# Patient Record
Sex: MALE | Race: WHITE | NOT HISPANIC OR LATINO
[De-identification: names, ages, dates, MRNs, and addresses within clinical notes are randomized per-mention and may not be internally consistent; named-entity substitution may affect disease eponyms.]

---

## 2017-04-21 ENCOUNTER — RX RENEWAL (OUTPATIENT)
Age: 52
End: 2017-04-21

## 2017-04-23 ENCOUNTER — FORM ENCOUNTER (OUTPATIENT)
Age: 52
End: 2017-04-23

## 2017-04-24 ENCOUNTER — APPOINTMENT (OUTPATIENT)
Dept: INTERNAL MEDICINE | Facility: CLINIC | Age: 52
End: 2017-04-24

## 2017-04-24 ENCOUNTER — APPOINTMENT (OUTPATIENT)
Dept: ULTRASOUND IMAGING | Facility: IMAGING CENTER | Age: 52
End: 2017-04-24

## 2017-04-24 ENCOUNTER — OUTPATIENT (OUTPATIENT)
Dept: OUTPATIENT SERVICES | Facility: HOSPITAL | Age: 52
LOS: 1 days | End: 2017-04-24
Payer: COMMERCIAL

## 2017-04-24 VITALS
WEIGHT: 200 LBS | DIASTOLIC BLOOD PRESSURE: 80 MMHG | HEART RATE: 76 BPM | BODY MASS INDEX: 30.31 KG/M2 | SYSTOLIC BLOOD PRESSURE: 126 MMHG | HEIGHT: 68 IN | OXYGEN SATURATION: 97 % | TEMPERATURE: 98.2 F

## 2017-04-24 DIAGNOSIS — R10.9 UNSPECIFIED ABDOMINAL PAIN: ICD-10-CM

## 2017-04-24 DIAGNOSIS — Z98.89 OTHER SPECIFIED POSTPROCEDURAL STATES: Chronic | ICD-10-CM

## 2017-04-24 DIAGNOSIS — R19.7 DIARRHEA, UNSPECIFIED: ICD-10-CM

## 2017-04-24 PROCEDURE — 76700 US EXAM ABDOM COMPLETE: CPT

## 2017-04-25 LAB
ALBUMIN SERPL ELPH-MCNC: 4.7 G/DL
ALP BLD-CCNC: 97 U/L
ALT SERPL-CCNC: 60 U/L
ANION GAP SERPL CALC-SCNC: 19 MMOL/L
APPEARANCE: CLEAR
AST SERPL-CCNC: 40 U/L
BASOPHILS # BLD AUTO: 0.02 K/UL
BASOPHILS NFR BLD AUTO: 0.2 %
BILIRUB SERPL-MCNC: 0.6 MG/DL
BILIRUBIN URINE: NEGATIVE
BLOOD URINE: NEGATIVE
BUN SERPL-MCNC: 19 MG/DL
CALCIUM SERPL-MCNC: 9.7 MG/DL
CHLORIDE SERPL-SCNC: 103 MMOL/L
CO2 SERPL-SCNC: 22 MMOL/L
COLOR: YELLOW
CREAT SERPL-MCNC: 1.16 MG/DL
EOSINOPHIL # BLD AUTO: 0.15 K/UL
EOSINOPHIL NFR BLD AUTO: 1.6 %
GLUCOSE QUALITATIVE U: NORMAL MG/DL
GLUCOSE SERPL-MCNC: 93 MG/DL
HCT VFR BLD CALC: 49.4 %
HGB BLD-MCNC: 16.9 G/DL
IMM GRANULOCYTES NFR BLD AUTO: 0.2 %
KETONES URINE: NEGATIVE
LEUKOCYTE ESTERASE URINE: NEGATIVE
LYMPHOCYTES # BLD AUTO: 2.25 K/UL
LYMPHOCYTES NFR BLD AUTO: 24.5 %
MAN DIFF?: NORMAL
MCHC RBC-ENTMCNC: 31.2 PG
MCHC RBC-ENTMCNC: 34.2 GM/DL
MCV RBC AUTO: 91.3 FL
MONOCYTES # BLD AUTO: 0.72 K/UL
MONOCYTES NFR BLD AUTO: 7.8 %
NEUTROPHILS # BLD AUTO: 6.02 K/UL
NEUTROPHILS NFR BLD AUTO: 65.7 %
NITRITE URINE: NEGATIVE
PH URINE: 5.5
PLATELET # BLD AUTO: 239 K/UL
POTASSIUM SERPL-SCNC: 4.9 MMOL/L
PROT SERPL-MCNC: 7.9 G/DL
PROTEIN URINE: NEGATIVE MG/DL
RBC # BLD: 5.41 M/UL
RBC # FLD: 14 %
SODIUM SERPL-SCNC: 144 MMOL/L
SPECIFIC GRAVITY URINE: 1.02
UROBILINOGEN URINE: NORMAL MG/DL
WBC # FLD AUTO: 9.18 K/UL

## 2017-07-13 ENCOUNTER — RX RENEWAL (OUTPATIENT)
Age: 52
End: 2017-07-13

## 2017-07-24 ENCOUNTER — RX RENEWAL (OUTPATIENT)
Age: 52
End: 2017-07-24

## 2017-09-18 ENCOUNTER — RX RENEWAL (OUTPATIENT)
Age: 52
End: 2017-09-18

## 2017-09-25 ENCOUNTER — TRANSCRIPTION ENCOUNTER (OUTPATIENT)
Age: 52
End: 2017-09-25

## 2017-12-13 ENCOUNTER — RX RENEWAL (OUTPATIENT)
Age: 52
End: 2017-12-13

## 2017-12-18 ENCOUNTER — RX RENEWAL (OUTPATIENT)
Age: 52
End: 2017-12-18

## 2018-02-08 ENCOUNTER — APPOINTMENT (OUTPATIENT)
Dept: INTERNAL MEDICINE | Facility: CLINIC | Age: 53
End: 2018-02-08
Payer: COMMERCIAL

## 2018-02-08 VITALS
OXYGEN SATURATION: 96 % | BODY MASS INDEX: 31.98 KG/M2 | HEART RATE: 90 BPM | HEIGHT: 68 IN | SYSTOLIC BLOOD PRESSURE: 140 MMHG | WEIGHT: 211 LBS | DIASTOLIC BLOOD PRESSURE: 110 MMHG | TEMPERATURE: 98.2 F

## 2018-02-08 PROCEDURE — 99213 OFFICE O/P EST LOW 20 MIN: CPT | Mod: 25

## 2018-02-08 PROCEDURE — 87804 INFLUENZA ASSAY W/OPTIC: CPT | Mod: QW

## 2018-02-08 PROCEDURE — 87880 STREP A ASSAY W/OPTIC: CPT | Mod: QW

## 2018-02-09 LAB
FLUAV SPEC QL CULT: NORMAL
FLUBV AG SPEC QL IA: NORMAL
S PYO AG SPEC QL IA: NORMAL

## 2018-03-19 ENCOUNTER — RX RENEWAL (OUTPATIENT)
Age: 53
End: 2018-03-19

## 2018-04-13 ENCOUNTER — RX RENEWAL (OUTPATIENT)
Age: 53
End: 2018-04-13

## 2018-05-29 ENCOUNTER — RX RENEWAL (OUTPATIENT)
Age: 53
End: 2018-05-29

## 2018-06-04 ENCOUNTER — TRANSCRIPTION ENCOUNTER (OUTPATIENT)
Age: 53
End: 2018-06-04

## 2018-06-05 ENCOUNTER — RESULT REVIEW (OUTPATIENT)
Age: 53
End: 2018-06-05

## 2018-07-17 ENCOUNTER — RESULT REVIEW (OUTPATIENT)
Age: 53
End: 2018-07-17

## 2018-08-03 ENCOUNTER — APPOINTMENT (OUTPATIENT)
Dept: INTERNAL MEDICINE | Facility: CLINIC | Age: 53
End: 2018-08-03
Payer: COMMERCIAL

## 2018-08-03 ENCOUNTER — LABORATORY RESULT (OUTPATIENT)
Age: 53
End: 2018-08-03

## 2018-08-03 VITALS
OXYGEN SATURATION: 98 % | SYSTOLIC BLOOD PRESSURE: 154 MMHG | HEART RATE: 93 BPM | TEMPERATURE: 98.5 F | HEIGHT: 68 IN | BODY MASS INDEX: 31.37 KG/M2 | WEIGHT: 207 LBS | DIASTOLIC BLOOD PRESSURE: 104 MMHG

## 2018-08-03 PROCEDURE — 99213 OFFICE O/P EST LOW 20 MIN: CPT

## 2018-08-03 RX ORDER — CIPROFLOXACIN HYDROCHLORIDE 500 MG/1
500 TABLET, FILM COATED ORAL
Qty: 14 | Refills: 0 | Status: DISCONTINUED | COMMUNITY
Start: 2017-04-24 | End: 2018-08-03

## 2018-08-03 RX ORDER — IBUPROFEN 800 MG/1
800 TABLET, FILM COATED ORAL
Qty: 30 | Refills: 0 | Status: DISCONTINUED | COMMUNITY
Start: 2017-03-09 | End: 2018-08-03

## 2018-08-03 RX ORDER — AZITHROMYCIN 250 MG/1
250 TABLET, FILM COATED ORAL
Qty: 1 | Refills: 0 | Status: DISCONTINUED | COMMUNITY
Start: 2018-02-08 | End: 2018-08-03

## 2018-08-03 RX ORDER — METRONIDAZOLE 500 MG/1
500 TABLET ORAL EVERY 8 HOURS
Qty: 21 | Refills: 0 | Status: DISCONTINUED | COMMUNITY
Start: 2017-04-24 | End: 2018-08-03

## 2018-08-03 NOTE — PHYSICAL EXAM
[No Acute Distress] : no acute distress [Well-Appearing] : well-appearing [No CVA Tenderness] : no CVA  tenderness [de-identified] : vesicular lesion on penis testicles, inner thigh and left buttock

## 2018-08-03 NOTE — PLAN
[FreeTextEntry1] : \par #1 Genital lesions\par Likely herpes\par Viral culture sent\par Check IgM and IgG\par Start valacyclovir\par \par #2 Elevated BP\par No longer taking metoprolol, was previously advised he could stop by cardiology\par Will check home BP\par Schedule PCP follow up\par

## 2018-08-03 NOTE — HISTORY OF PRESENT ILLNESS
[FreeTextEntry8] : \par Rash on genital area and left inner thigh and extends to left buttock.\par Looked like blisters initially 2 days ago.  Now very painful.  No oozing.  Feeling otherwise well, no fever/chills, no fatigue, no body aches, no dysuria, no GI symptoms.  \par No recent ravel, no swimming.  \par Sexually active with wife only.  \par H/o herpes lesions around mouth since childhood.  \par

## 2018-08-07 RX ORDER — VALACYCLOVIR 1 G/1
1 TABLET, FILM COATED ORAL
Qty: 14 | Refills: 0 | Status: COMPLETED | COMMUNITY
Start: 2018-08-03 | End: 2018-08-07

## 2018-09-20 ENCOUNTER — MOBILE ON CALL (OUTPATIENT)
Age: 53
End: 2018-09-20

## 2018-10-04 ENCOUNTER — RX RENEWAL (OUTPATIENT)
Age: 53
End: 2018-10-04

## 2018-10-05 ENCOUNTER — RX RENEWAL (OUTPATIENT)
Age: 53
End: 2018-10-05

## 2018-10-29 ENCOUNTER — MEDICATION RENEWAL (OUTPATIENT)
Age: 53
End: 2018-10-29

## 2019-01-18 ENCOUNTER — NON-APPOINTMENT (OUTPATIENT)
Age: 54
End: 2019-01-18

## 2019-01-18 ENCOUNTER — APPOINTMENT (OUTPATIENT)
Dept: INTERNAL MEDICINE | Facility: CLINIC | Age: 54
End: 2019-01-18
Payer: COMMERCIAL

## 2019-01-18 VITALS
OXYGEN SATURATION: 96 % | HEART RATE: 85 BPM | WEIGHT: 214 LBS | TEMPERATURE: 98 F | DIASTOLIC BLOOD PRESSURE: 102 MMHG | BODY MASS INDEX: 32.43 KG/M2 | HEIGHT: 68 IN | SYSTOLIC BLOOD PRESSURE: 160 MMHG

## 2019-01-18 VITALS — DIASTOLIC BLOOD PRESSURE: 110 MMHG | SYSTOLIC BLOOD PRESSURE: 170 MMHG

## 2019-01-18 PROCEDURE — 93000 ELECTROCARDIOGRAM COMPLETE: CPT

## 2019-01-18 PROCEDURE — 99215 OFFICE O/P EST HI 40 MIN: CPT | Mod: 25

## 2019-01-18 PROCEDURE — 36415 COLL VENOUS BLD VENIPUNCTURE: CPT

## 2019-01-18 RX ORDER — BLOOD PRESSURE TEST KIT-LARGE
KIT MISCELLANEOUS
Qty: 1 | Refills: 0 | Status: DISCONTINUED | COMMUNITY
Start: 2018-08-03 | End: 2019-01-18

## 2019-01-18 NOTE — ASSESSMENT
[FreeTextEntry1] : HTN:  given extreme elevation, unlikley to be simple white coat HTN\par -needs home monitoring \par -start meds today: amlodipine 10mg daily\par -HTN may be the cause of new HA for the past year\par -labs to assess renal fucntion\par \par Metabolic screening: on statin, needs a1c\par \par Hx of Afib: now seeimingly resolved, SR today.  \par \par Requests sildenafil refill.  \par \par HCM:  flu shot today\par will be seeing GI (Sideridis) next week, with plan to do CLS and EGD \par \par F/u in 4-6 weeks

## 2019-01-18 NOTE — HISTORY OF PRESENT ILLNESS
[FreeTextEntry1] : f/u multiple issues [de-identified] : 54 y/o male with GERD, transaminitis, hx of AFIB, elevated BP, last routine f/u here in 2016.  \par \par HCM: no CRC screening on file\par -needs metabolic screening.  \par \par C/o HA x 1 year, 1 x a week, better with excedrin migraine. no nausea, no fatigue, no photo/phonophobia. never had HA before.  Last eye exam last week, fine.  \par \par Will be seeing Fidelia from GI next week.  No GERD symptoms with pantoprazole.  will be needing CLS and EGD. \par \par Had afib preop, states it was stress related.  Last EKG was SR.  Took metoprolol for a short time but did not tolerate side effects (significant fatigue).

## 2019-01-18 NOTE — PHYSICAL EXAM
[No Acute Distress] : no acute distress [Normal Sclera/Conjunctiva] : normal sclera/conjunctiva [Normal Oropharynx] : the oropharynx was normal [Supple] : supple [No Respiratory Distress] : no respiratory distress  [Clear to Auscultation] : lungs were clear to auscultation bilaterally [Normal Rate] : normal rate  [Normal S1, S2] : normal S1 and S2 [No Murmur] : no murmur heard [No Edema] : there was no peripheral edema [Normal Supraclavicular Nodes] : no supraclavicular lymphadenopathy [Normal Anterior Cervical Nodes] : no anterior cervical lymphadenopathy

## 2019-01-22 ENCOUNTER — RX RENEWAL (OUTPATIENT)
Age: 54
End: 2019-01-22

## 2019-01-29 LAB
ALBUMIN SERPL ELPH-MCNC: 5 G/DL
ALP BLD-CCNC: 102 U/L
ALT SERPL-CCNC: 109 U/L
ANION GAP SERPL CALC-SCNC: 12 MMOL/L
AST SERPL-CCNC: 66 U/L
BASOPHILS # BLD AUTO: 0.02 K/UL
BASOPHILS NFR BLD AUTO: 0.3 %
BILIRUB SERPL-MCNC: 0.5 MG/DL
BUN SERPL-MCNC: 17 MG/DL
CALCIUM SERPL-MCNC: 10.1 MG/DL
CHLORIDE SERPL-SCNC: 106 MMOL/L
CHOLEST SERPL-MCNC: 144 MG/DL
CHOLEST/HDLC SERPL: 3.1 RATIO
CO2 SERPL-SCNC: 24 MMOL/L
CREAT SERPL-MCNC: 0.96 MG/DL
EOSINOPHIL # BLD AUTO: 0.19 K/UL
EOSINOPHIL NFR BLD AUTO: 2.5 %
GLUCOSE SERPL-MCNC: 81 MG/DL
HBA1C MFR BLD HPLC: 5.8 %
HCT VFR BLD CALC: 48.1 %
HDLC SERPL-MCNC: 47 MG/DL
HGB BLD-MCNC: 16.2 G/DL
IMM GRANULOCYTES NFR BLD AUTO: 0.4 %
LDLC SERPL CALC-MCNC: 72 MG/DL
LYMPHOCYTES # BLD AUTO: 2.54 K/UL
LYMPHOCYTES NFR BLD AUTO: 33 %
MAN DIFF?: NORMAL
MCHC RBC-ENTMCNC: 30.2 PG
MCHC RBC-ENTMCNC: 33.7 GM/DL
MCV RBC AUTO: 89.6 FL
MONOCYTES # BLD AUTO: 0.63 K/UL
MONOCYTES NFR BLD AUTO: 8.2 %
NEUTROPHILS # BLD AUTO: 4.28 K/UL
NEUTROPHILS NFR BLD AUTO: 55.6 %
PLATELET # BLD AUTO: 252 K/UL
POTASSIUM SERPL-SCNC: 4.2 MMOL/L
PROT SERPL-MCNC: 7.7 G/DL
RBC # BLD: 5.37 M/UL
RBC # FLD: 13.4 %
SODIUM SERPL-SCNC: 142 MMOL/L
TRIGL SERPL-MCNC: 126 MG/DL
TSH SERPL-ACNC: 2.03 UIU/ML
WBC # FLD AUTO: 7.69 K/UL

## 2019-02-11 ENCOUNTER — MEDICATION RENEWAL (OUTPATIENT)
Age: 54
End: 2019-02-11

## 2019-03-25 ENCOUNTER — APPOINTMENT (OUTPATIENT)
Dept: INTERNAL MEDICINE | Facility: CLINIC | Age: 54
End: 2019-03-25

## 2019-05-09 ENCOUNTER — RX RENEWAL (OUTPATIENT)
Age: 54
End: 2019-05-09

## 2019-06-28 ENCOUNTER — RX RENEWAL (OUTPATIENT)
Age: 54
End: 2019-06-28

## 2019-10-22 ENCOUNTER — APPOINTMENT (OUTPATIENT)
Dept: INTERNAL MEDICINE | Facility: CLINIC | Age: 54
End: 2019-10-22

## 2019-10-22 ENCOUNTER — APPOINTMENT (OUTPATIENT)
Dept: INTERNAL MEDICINE | Facility: CLINIC | Age: 54
End: 2019-10-22
Payer: COMMERCIAL

## 2019-10-22 VITALS
TEMPERATURE: 98.8 F | DIASTOLIC BLOOD PRESSURE: 80 MMHG | BODY MASS INDEX: 32.28 KG/M2 | HEART RATE: 79 BPM | OXYGEN SATURATION: 98 % | HEIGHT: 68 IN | SYSTOLIC BLOOD PRESSURE: 126 MMHG | WEIGHT: 213 LBS

## 2019-10-22 DIAGNOSIS — K21.9 GASTRO-ESOPHAGEAL REFLUX DISEASE W/OUT ESOPHAGITIS: ICD-10-CM

## 2019-10-22 DIAGNOSIS — S16.1XXA STRAIN OF MUSCLE, FASCIA AND TENDON AT NECK LEVEL, INITIAL ENCOUNTER: ICD-10-CM

## 2019-10-22 PROCEDURE — 99214 OFFICE O/P EST MOD 30 MIN: CPT

## 2019-10-22 NOTE — REVIEW OF SYSTEMS
[Fever] : no fever [Chills] : no chills [Night Sweats] : no night sweats [Hearing Loss] : no hearing loss [Earache] : no earache [Sore Throat] : no sore throat [Hoarseness] : no hoarseness [Chest Pain] : no chest pain [Palpitations] : no palpitations [Lower Ext Edema] : no lower extremity edema [Orthopena] : no orthopnea [Paroxysmal Nocturnal Dyspnea] : no paroxysmal nocturnal dyspnea [Shortness Of Breath] : no shortness of breath [Cough] : no cough [Abdominal Pain] : no abdominal pain [Nausea] : no nausea [Heartburn] : no heartburn [Back Pain] : no back pain [Negative] : Heme/Lymph [FreeTextEntry9] : see HPI - neck pain and left shoulder pain

## 2019-10-22 NOTE — ASSESSMENT
[FreeTextEntry1] : \par Left neck strain:\par NSAIDs with no improvement\par Will try a course of prednisone + muscle relaxant, warm compresses, light massage, increased ROM exercises as tolerated\par Follow up for PT once improved (will go through work[employer])\par \par Hypertension:\par Well controlled on amlodipine, continue\par \par GERD:\par Will try to wean PPI once prednisone stopped, 20mg pantoprazole sent into mail order pharmacy (currently taking 40mg)\par \par Schedule PCP follow up\par

## 2019-10-22 NOTE — PHYSICAL EXAM
[Normal] : normal rate, regular rhythm, normal S1 and S2 and no murmur heard [No Edema] : there was no peripheral edema [No Extremity Clubbing/Cyanosis] : no extremity clubbing/cyanosis [No Rash] : no rash [No Joint Swelling] : no joint swelling [de-identified] : considerably impaired ROM with left arm - unable to raise left arm past 90 degrees; tender over left trap

## 2019-10-22 NOTE — HISTORY OF PRESENT ILLNESS
[FreeTextEntry8] : \coretta Presents today for flu shot and c/o left posterior neck pain x 2-3 weeks.  Hurts to turn the neck.  Thought had just slept wrong.  Left shoulder surgery in 2014 and 2016 (work injury - labrum and rotator cuff repair, then developed bone spur). \coretta Has had some left arm numbness/tingling.  \coretta Has been taking Advil and Aleve (once/day) as well as Voltaren get.  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  \par \coretta Has been taking amlodipine since last visit.  Headaches are gone.  Has been checking BP at home, reports normal readings Does not have log but think 100s-120s/60s-80s).  Tolerating amlodipine.  \par \coretta Has been on pantoprazole 40mg for many years,  denies any current heartburn symptoms.  Recent GI follow up, needs EGD and CRS.  \par

## 2019-11-05 ENCOUNTER — MEDICATION RENEWAL (OUTPATIENT)
Age: 54
End: 2019-11-05

## 2019-11-06 ENCOUNTER — MEDICATION RENEWAL (OUTPATIENT)
Age: 54
End: 2019-11-06

## 2019-11-21 ENCOUNTER — RX RENEWAL (OUTPATIENT)
Age: 54
End: 2019-11-21

## 2019-12-13 ENCOUNTER — APPOINTMENT (OUTPATIENT)
Dept: INTERNAL MEDICINE | Facility: CLINIC | Age: 54
End: 2019-12-13
Payer: COMMERCIAL

## 2019-12-13 ENCOUNTER — TRANSCRIPTION ENCOUNTER (OUTPATIENT)
Age: 54
End: 2019-12-13

## 2019-12-13 VITALS
HEIGHT: 68 IN | BODY MASS INDEX: 32.74 KG/M2 | DIASTOLIC BLOOD PRESSURE: 80 MMHG | OXYGEN SATURATION: 98 % | HEART RATE: 83 BPM | TEMPERATURE: 97.9 F | SYSTOLIC BLOOD PRESSURE: 152 MMHG | WEIGHT: 216 LBS

## 2019-12-13 VITALS — DIASTOLIC BLOOD PRESSURE: 85 MMHG | SYSTOLIC BLOOD PRESSURE: 135 MMHG

## 2019-12-13 DIAGNOSIS — B02.39 OTHER HERPES ZOSTER EYE DISEASE: ICD-10-CM

## 2019-12-13 DIAGNOSIS — Z87.09 PERSONAL HISTORY OF OTHER DISEASES OF THE RESPIRATORY SYSTEM: ICD-10-CM

## 2019-12-13 DIAGNOSIS — Z87.898 PERSONAL HISTORY OF OTHER SPECIFIED CONDITIONS: ICD-10-CM

## 2019-12-13 DIAGNOSIS — N50.89 OTHER SPECIFIED DISORDERS OF THE MALE GENITAL ORGANS: ICD-10-CM

## 2019-12-13 DIAGNOSIS — R10.9 UNSPECIFIED ABDOMINAL PAIN: ICD-10-CM

## 2019-12-13 DIAGNOSIS — F43.22 ADJUSTMENT DISORDER WITH ANXIETY: ICD-10-CM

## 2019-12-13 DIAGNOSIS — Z87.19 PERSONAL HISTORY OF OTHER DISEASES OF THE DIGESTIVE SYSTEM: ICD-10-CM

## 2019-12-13 DIAGNOSIS — R07.1 CHEST PAIN ON BREATHING: ICD-10-CM

## 2019-12-13 PROCEDURE — 36415 COLL VENOUS BLD VENIPUNCTURE: CPT

## 2019-12-13 PROCEDURE — 99214 OFFICE O/P EST MOD 30 MIN: CPT | Mod: 25

## 2019-12-13 RX ORDER — PREDNISONE 50 MG/1
50 TABLET ORAL DAILY
Qty: 5 | Refills: 0 | Status: DISCONTINUED | COMMUNITY
Start: 2019-10-22 | End: 2019-12-13

## 2019-12-13 RX ORDER — CYCLOBENZAPRINE HYDROCHLORIDE 10 MG/1
10 TABLET, FILM COATED ORAL
Qty: 10 | Refills: 0 | Status: DISCONTINUED | COMMUNITY
Start: 2019-10-22 | End: 2019-12-13

## 2019-12-13 NOTE — PHYSICAL EXAM
[Normal Oropharynx] : the oropharynx was normal [No Acute Distress] : no acute distress [No Lymphadenopathy] : no lymphadenopathy [No Respiratory Distress] : no respiratory distress  [Clear to Auscultation] : lungs were clear to auscultation bilaterally [Normal Rate] : normal rate  [Regular Rhythm] : with a regular rhythm [Normal S1, S2] : normal S1 and S2

## 2019-12-13 NOTE — ASSESSMENT
[FreeTextEntry1] : HTN:  adequate control on repeat. continut amlodipine 10\par \par HLD: labs today\par \par Transaminitis:  check labs today, may need new imaging to eval for fatty liver\par \par URI symptoms: likely viral, advised nsaids if needed for sore throat, fluids, rest, avoid decongestants, call or return if fevrs, CP< SOB or other new or worsening symptoms\par \par

## 2019-12-13 NOTE — REVIEW OF SYSTEMS
[Nasal Discharge] : nasal discharge [Earache] : no earache [Shortness Of Breath] : no shortness of breath [Wheezing] : no wheezing [Sore Throat] : sore throat [Dyspnea on Exertion] : not dyspnea on exertion [Cough] : cough [Negative] : Gastrointestinal

## 2019-12-13 NOTE — HISTORY OF PRESENT ILLNESS
[FreeTextEntry1] : F/u HTN. HLD, transaminitis [de-identified] : Reports he had the flu shot already?\par \par 5 days of stuffy nose sore throat, mild cough, no SOB, no fevers, no body aches.\par \par Did not see GI , but does need to do this (due for EGD CLS)\par \par Also planning for vasectomy.\par \par reports adherence to 3 pills, doing fine with 20mg of PPI.  \par \par

## 2019-12-17 LAB
ALBUMIN SERPL ELPH-MCNC: 4.8 G/DL
ALP BLD-CCNC: 117 U/L
ALT SERPL-CCNC: 109 U/L
ANION GAP SERPL CALC-SCNC: 12 MMOL/L
AST SERPL-CCNC: 57 U/L
BASOPHILS # BLD AUTO: 0.03 K/UL
BASOPHILS NFR BLD AUTO: 0.5 %
BILIRUB SERPL-MCNC: 0.4 MG/DL
BUN SERPL-MCNC: 14 MG/DL
CALCIUM SERPL-MCNC: 9.7 MG/DL
CHLORIDE SERPL-SCNC: 104 MMOL/L
CHOLEST SERPL-MCNC: 139 MG/DL
CHOLEST/HDLC SERPL: 4 RATIO
CO2 SERPL-SCNC: 25 MMOL/L
CREAT SERPL-MCNC: 0.99 MG/DL
EOSINOPHIL # BLD AUTO: 0.18 K/UL
EOSINOPHIL NFR BLD AUTO: 2.7 %
ESTIMATED AVERAGE GLUCOSE: 117 MG/DL
GLUCOSE SERPL-MCNC: 123 MG/DL
HBA1C MFR BLD HPLC: 5.7 %
HCT VFR BLD CALC: 49.1 %
HDLC SERPL-MCNC: 35 MG/DL
HGB BLD-MCNC: 16.5 G/DL
IMM GRANULOCYTES NFR BLD AUTO: 0.3 %
LDLC SERPL CALC-MCNC: 44 MG/DL
LYMPHOCYTES # BLD AUTO: 1.63 K/UL
LYMPHOCYTES NFR BLD AUTO: 24.6 %
MAN DIFF?: NORMAL
MCHC RBC-ENTMCNC: 30.7 PG
MCHC RBC-ENTMCNC: 33.6 GM/DL
MCV RBC AUTO: 91.4 FL
MONOCYTES # BLD AUTO: 0.74 K/UL
MONOCYTES NFR BLD AUTO: 11.2 %
NEUTROPHILS # BLD AUTO: 4.02 K/UL
NEUTROPHILS NFR BLD AUTO: 60.7 %
PLATELET # BLD AUTO: 268 K/UL
POTASSIUM SERPL-SCNC: 4.4 MMOL/L
PROT SERPL-MCNC: 7.3 G/DL
RBC # BLD: 5.37 M/UL
RBC # FLD: 13 %
SODIUM SERPL-SCNC: 141 MMOL/L
TRIGL SERPL-MCNC: 300 MG/DL
WBC # FLD AUTO: 6.62 K/UL

## 2019-12-20 ENCOUNTER — APPOINTMENT (OUTPATIENT)
Dept: UROLOGY | Facility: CLINIC | Age: 54
End: 2019-12-20
Payer: COMMERCIAL

## 2019-12-20 DIAGNOSIS — Z30.09 ENCOUNTER FOR OTHER GENERAL COUNSELING AND ADVICE ON CONTRACEPTION: ICD-10-CM

## 2019-12-20 DIAGNOSIS — Z78.9 OTHER SPECIFIED HEALTH STATUS: ICD-10-CM

## 2019-12-20 PROCEDURE — 99203 OFFICE O/P NEW LOW 30 MIN: CPT

## 2019-12-20 NOTE — PHYSICAL EXAM
[General Appearance - Well Developed] : well developed [General Appearance - Well Nourished] : well nourished [Normal Appearance] : normal appearance [Well Groomed] : well groomed [General Appearance - In No Acute Distress] : no acute distress [Edema] : no peripheral edema [Exaggerated Use Of Accessory Muscles For Inspiration] : no accessory muscle use [Respiration, Rhythm And Depth] : normal respiratory rhythm and effort [Abdomen Soft] : soft [Abdomen Tenderness] : non-tender [Urethral Meatus] : meatus normal [Costovertebral Angle Tenderness] : no ~M costovertebral angle tenderness [Urinary Bladder Findings] : the bladder was normal on palpation [Scrotum] : the scrotum was normal [Testes Tenderness] : no tenderness of the testes [Testes Mass (___cm)] : there were no testicular masses [Normal Station and Gait] : the gait and station were normal for the patient's age [No Focal Deficits] : no focal deficits [] : no rash [Oriented To Time, Place, And Person] : oriented to person, place, and time [Affect] : the affect was normal [Mood] : the mood was normal [Not Anxious] : not anxious [FreeTextEntry1] : b/l palp vas

## 2019-12-20 NOTE — PHYSICAL EXAM
[General Appearance - Well Developed] : well developed [General Appearance - Well Nourished] : well nourished [Well Groomed] : well groomed [Normal Appearance] : normal appearance [General Appearance - In No Acute Distress] : no acute distress [Edema] : no peripheral edema [Respiration, Rhythm And Depth] : normal respiratory rhythm and effort [Exaggerated Use Of Accessory Muscles For Inspiration] : no accessory muscle use [Abdomen Soft] : soft [Abdomen Tenderness] : non-tender [Urethral Meatus] : meatus normal [Costovertebral Angle Tenderness] : no ~M costovertebral angle tenderness [Scrotum] : the scrotum was normal [Urinary Bladder Findings] : the bladder was normal on palpation [Testes Mass (___cm)] : there were no testicular masses [Testes Tenderness] : no tenderness of the testes [Normal Station and Gait] : the gait and station were normal for the patient's age [No Focal Deficits] : no focal deficits [] : no rash [Oriented To Time, Place, And Person] : oriented to person, place, and time [Affect] : the affect was normal [Not Anxious] : not anxious [Mood] : the mood was normal [FreeTextEntry1] : b/l palp vas

## 2019-12-20 NOTE — HISTORY OF PRESENT ILLNESS
[FreeTextEntry1] : Patient is a 55 yo M who presents for vasectomy consultation.  He was one child age 13 (daughter).  He is not interested in anymore children.  He has no urinary or sexual complaints.  His wife is not able to take oral contraceptives.  No scrotal pain.\par

## 2019-12-20 NOTE — ASSESSMENT
[FreeTextEntry1] : Patient is a 53 yo M who presents for vasectomy consultation.\par \par -Discussed with pt that vasectomy is permanent sterilization procedure\par -D/w pt that vasectomy is reversible it depends a number of factors for success and should not be an expectation postoperatively\par -he is aware and both he and wife have discussed vasectomy for past several mos/yrs and are in agreement\par -d/w pt that he cannot engage in unprotected intercourse without risk of pregnancy until confirmation of sterility with semen analysis, likely will require 15-20 ejaculations and/or >3 months\par -d/w he cannot engage in sports/exercise/sexual activity for at least 1 wk after vasectomy\par -d/w pt that I do not perform no scalpel vasectomy, and that I use scalpel\par -d/w pt risks/benefits/alternatives of vasectomy, including female contraception\par -d/w pt risks of chronic orchalgia, infection, bleeding/hematoma\par -[place] sterilization consent signed, he will schedule at his convenience\par -All questions answered, f/u for vasectomy under local

## 2020-01-06 ENCOUNTER — RX RENEWAL (OUTPATIENT)
Age: 55
End: 2020-01-06

## 2020-01-06 ENCOUNTER — TRANSCRIPTION ENCOUNTER (OUTPATIENT)
Age: 55
End: 2020-01-06

## 2020-01-21 ENCOUNTER — CLINICAL ADVICE (OUTPATIENT)
Age: 55
End: 2020-01-21

## 2020-01-23 ENCOUNTER — CLINICAL ADVICE (OUTPATIENT)
Age: 55
End: 2020-01-23

## 2020-01-23 DIAGNOSIS — F41.8 OTHER SPECIFIED ANXIETY DISORDERS: ICD-10-CM

## 2020-01-24 ENCOUNTER — APPOINTMENT (OUTPATIENT)
Dept: UROLOGY | Facility: CLINIC | Age: 55
End: 2020-01-24
Payer: COMMERCIAL

## 2020-01-24 ENCOUNTER — OUTPATIENT (OUTPATIENT)
Dept: OUTPATIENT SERVICES | Facility: HOSPITAL | Age: 55
LOS: 1 days | End: 2020-01-24
Payer: COMMERCIAL

## 2020-01-24 VITALS
DIASTOLIC BLOOD PRESSURE: 87 MMHG | TEMPERATURE: 98.6 F | SYSTOLIC BLOOD PRESSURE: 146 MMHG | HEART RATE: 93 BPM | RESPIRATION RATE: 16 BRPM

## 2020-01-24 VITALS — SYSTOLIC BLOOD PRESSURE: 139 MMHG | DIASTOLIC BLOOD PRESSURE: 87 MMHG

## 2020-01-24 DIAGNOSIS — Z98.89 OTHER SPECIFIED POSTPROCEDURAL STATES: Chronic | ICD-10-CM

## 2020-01-24 DIAGNOSIS — R35.0 FREQUENCY OF MICTURITION: ICD-10-CM

## 2020-01-24 PROCEDURE — 55250 REMOVAL OF SPERM DUCT(S): CPT

## 2020-01-27 ENCOUNTER — CLINICAL ADVICE (OUTPATIENT)
Age: 55
End: 2020-01-27

## 2020-01-28 ENCOUNTER — APPOINTMENT (OUTPATIENT)
Age: 55
End: 2020-01-28

## 2020-01-28 LAB — CORE LAB BIOPSY: NORMAL

## 2020-01-29 ENCOUNTER — CLINICAL ADVICE (OUTPATIENT)
Age: 55
End: 2020-01-29

## 2020-01-29 DIAGNOSIS — Z30.2 ENCOUNTER FOR STERILIZATION: ICD-10-CM

## 2020-02-24 ENCOUNTER — TRANSCRIPTION ENCOUNTER (OUTPATIENT)
Age: 55
End: 2020-02-24

## 2020-03-10 ENCOUNTER — APPOINTMENT (OUTPATIENT)
Dept: UROLOGY | Facility: CLINIC | Age: 55
End: 2020-03-10
Payer: COMMERCIAL

## 2020-03-10 PROCEDURE — 99024 POSTOP FOLLOW-UP VISIT: CPT

## 2020-03-10 NOTE — HISTORY OF PRESENT ILLNESS
[FreeTextEntry1] : Patient is a 53 yo M who presents for orchalgia.  He reports mild constant right sided scrotal pain since vasectomy 1/24/20.  Rates as 2-3/10.  Has not been taking any pain medication.  He has resumed his normal activities/exercise.  He is anxious because he recently felt a lump next to his right testis for past couple of days.  No fever/chills.  No dysuria or change in urination.\par

## 2020-03-10 NOTE — ASSESSMENT
[FreeTextEntry1] : Patient is a 55 yo M who presents for R orchalgia.\par \par On exam appears to be related to vasectomy - ?sperm granuloma or suture.\par Reassured pt\par NSAIDs x2 wks\par F/u for post-vasectomy SA

## 2020-03-10 NOTE — PHYSICAL EXAM
[General Appearance - Well Developed] : well developed [General Appearance - Well Nourished] : well nourished [Normal Appearance] : normal appearance [Well Groomed] : well groomed [General Appearance - In No Acute Distress] : no acute distress [Scrotum] : the scrotum was normal [Testes Tenderness] : no tenderness of the testes [Testes Mass (___cm)] : there were no testicular masses [FreeTextEntry1] : small firm subcentimeter lump around site of vasectomy in R hemiscrotum.  Mildly ttp.  Separate from R testis.

## 2020-03-12 ENCOUNTER — APPOINTMENT (OUTPATIENT)
Age: 55
End: 2020-03-12

## 2020-03-12 LAB — BACTERIA UR CULT: NORMAL

## 2020-03-13 ENCOUNTER — APPOINTMENT (OUTPATIENT)
Dept: UROLOGY | Facility: CLINIC | Age: 55
End: 2020-03-13
Payer: COMMERCIAL

## 2020-03-13 DIAGNOSIS — N50.819 TESTICULAR PAIN, UNSPECIFIED: ICD-10-CM

## 2020-03-13 PROCEDURE — 99024 POSTOP FOLLOW-UP VISIT: CPT

## 2020-03-13 NOTE — PHYSICAL EXAM
[General Appearance - Well Developed] : well developed [General Appearance - Well Nourished] : well nourished [Normal Appearance] : normal appearance [Well Groomed] : well groomed [General Appearance - In No Acute Distress] : no acute distress [FreeTextEntry1] : L testis swollen and ttp c/w orchitis.  No palpable fluid collection or abscess palpable.  No fluctuance or crepitus.  Diffuse erythema of scrotum

## 2020-03-13 NOTE — ASSESSMENT
[FreeTextEntry1] : Patient is a 53 yo M who presents for orchalgia, orchitis.\par \par Repeat urine for cx and GC testing\par Initiate empiric doxy\par Cont motrin\par F/u 1 mo

## 2020-03-13 NOTE — HISTORY OF PRESENT ILLNESS
[FreeTextEntry1] : Patient is a 53 yo M who presents for orchalgia.  He reports mild constant right sided scrotal pain since vasectomy 1/24/20.  Rates as 2-3/10.  Has not been taking any pain medication.  He has resumed his normal activities/exercise.  He is anxious because he recently felt a lump next to his right testis for past couple of days.  No fever/chills.  No dysuria or change in urination.\par He reports now his left testis over past 2 days has become more swollen and painful.  Now a sharp severe constant pain.  Urine cx from 3/9/20 was negative.\par No fever/chills.\par

## 2020-03-16 LAB
BACTERIA UR CULT: NORMAL
C TRACH RRNA SPEC QL NAA+PROBE: NOT DETECTED
N GONORRHOEA RRNA SPEC QL NAA+PROBE: NOT DETECTED
SOURCE AMPLIFICATION: NORMAL

## 2020-03-29 ENCOUNTER — RX RENEWAL (OUTPATIENT)
Age: 55
End: 2020-03-29

## 2020-04-20 ENCOUNTER — OUTPATIENT (OUTPATIENT)
Dept: OUTPATIENT SERVICES | Facility: HOSPITAL | Age: 55
LOS: 1 days | End: 2020-04-20
Payer: COMMERCIAL

## 2020-04-20 ENCOUNTER — APPOINTMENT (OUTPATIENT)
Dept: UROLOGY | Facility: CLINIC | Age: 55
End: 2020-04-20
Payer: COMMERCIAL

## 2020-04-20 DIAGNOSIS — N45.3 EPIDIDYMO-ORCHITIS: ICD-10-CM

## 2020-04-20 DIAGNOSIS — R35.0 FREQUENCY OF MICTURITION: ICD-10-CM

## 2020-04-20 DIAGNOSIS — Z98.89 OTHER SPECIFIED POSTPROCEDURAL STATES: Chronic | ICD-10-CM

## 2020-04-20 PROCEDURE — 76870 US EXAM SCROTUM: CPT | Mod: 26

## 2020-04-20 PROCEDURE — 99213 OFFICE O/P EST LOW 20 MIN: CPT | Mod: 25

## 2020-04-20 PROCEDURE — 76870 US EXAM SCROTUM: CPT

## 2020-04-21 DIAGNOSIS — Z20.828 CONTACT WITH AND (SUSPECTED) EXPOSURE TO OTHER VIRAL COMMUNICABLE DISEASES: ICD-10-CM

## 2020-04-21 LAB
APPEARANCE: CLEAR
BACTERIA UR CULT: NORMAL
BACTERIA: NEGATIVE
BILIRUBIN URINE: NEGATIVE
BLOOD URINE: NEGATIVE
COLOR: YELLOW
GLUCOSE QUALITATIVE U: NEGATIVE
HYALINE CASTS: 1 /LPF
KETONES URINE: NEGATIVE
LEUKOCYTE ESTERASE URINE: NEGATIVE
MICROSCOPIC-UA: NORMAL
NITRITE URINE: NEGATIVE
PH URINE: 5.5
PROTEIN URINE: NORMAL
RED BLOOD CELLS URINE: 1 /HPF
SPECIFIC GRAVITY URINE: 1.03
SQUAMOUS EPITHELIAL CELLS: 0 /HPF
UROBILINOGEN URINE: NORMAL
WHITE BLOOD CELLS URINE: 1 /HPF

## 2020-04-25 NOTE — HISTORY OF PRESENT ILLNESS
[FreeTextEntry1] : Spoke with patient\par \par COVID testing negative\par \par Still weak when he stands up\par \par cough when lying down.  \par \par mild SOB wih exertion.

## 2020-04-25 NOTE — PLAN
[FreeTextEntry1] : 55 y/o male HTN with likely COVID infection despite neg testing results\par Day ~12\par \par Still tired, weak, breathing okay\par Has some post vasectomy pain and swelling, advised him to call , he is agreeable\par \par Advised careful monitoring of temperature, symptoms, especially SOB/cough. Should limit contact with others, increase hydration and rest, tylenol for fever, myalgia. Should call if symptoms worsen or if questions arise. will follow up by phone in 48 hours.\par

## 2020-04-26 NOTE — LETTER BODY
[FreeTextEntry1] : Sharon Landa MD\par [address]\[place] [address]\par [phone]\par \par Dear Dr. Landa,\par \par Reason for visit: Left testicular discomfort after vasectomy\par \par This is a 54 year year-old man with previous vasectomy 3 months ago.  Patient returns for followup. SInce he was last seen, patient reports left testicular discomfort for the past 2 weeks.  His pain score is 2 out of 10.  Patient reports swelling of the left testis.  Patient denies any interval complaints or difficulties. He  denies any dysuria or gross hematuria.Patient denies any changes in health. The past medical history and family history and social history are unchanged. All other review of systems are negative. Patient denies any changes in medications. Medication list was reconciled.\par \par On examination, the patient is in no acute distress. He is alert and oriented follows commands. He has normal mood and affect. He is normocephalic. Oral no thrush. Neck is supple. Respirations are unlabored. His abdomen is soft and nontender. Liver is nonpalpable. Bladder is nonpalpable. No CVA tenderness. Neurologically he is grossly intact. No peripheral edema. Skin without gross abnormality. He has normal male external genitalia. Normal meatus. Bilateral testes are descended intrascrotally and normal to palpation.  There is a small left granuloma of the vas deferens\par \par I personally reviewed the ultrasound scan with patient today. Ultrasound demonstrated small left hydrocele and varicocele.\par \par Assessment: Left testicular discomfort after vasectomy\par \par I counseled the patient.  I reassured the patient.  There is no evidence of torsion or testicular masses.  I recommend conservative management with anti-inflammatory and cool compress to the area.  I encouraged him to obtain a semen analysis to confirm post vasectomy sterility. Risks and alternatives were discussed. I answered the patient questions. The patient will follow-up as directed and will contact me with any questions or concerns. Thank you for the opportunity to participate in the care of this patient. I'll keep you updated on his progress.\par \par Plan: Semen analysis\par \par

## 2020-04-28 DIAGNOSIS — G56.00 CARPAL TUNNEL SYNDROME, UNSPECIFIED UPPER LIMB: ICD-10-CM

## 2020-04-28 DIAGNOSIS — Z30.2 ENCOUNTER FOR STERILIZATION: ICD-10-CM

## 2020-04-28 NOTE — HISTORY OF PRESENT ILLNESS
[FreeTextEntry1] : Correction on Days of illness:  feels symptoms started in earnest on 4/21 (NOT 4/13)\par \par reports diarrhea yesterday, mulitple times, woke up at 3am, and lasted till 12 Noon.  \par \par Denies any unusual food intake\par \par Wife went back to work.  \par \par Reports he has a tender lump on left testicle, anterior side size of gumball, no redness.  Had been seen by MARY GRACE for the same, had sono, urine culture (negative) treated with ABX, has call in to  offic enow\par \par No fevers no SOB.  Taking tylenol every 6 hours.  temp now 99.9\par \par Can't stand for more than a few minutes, needs to sit down.  Checked BP yesterday, reports it is normal\par \par Drinking a lot of water, eating. /84\par \par No nausea, no vomiting.

## 2020-04-28 NOTE — PLAN
[FreeTextEntry1] : 53 y/o male, HTN, with (CORRECTED) Day 7 of COVID infection\par Still with fatigue, significant diarrhea yesterday\par On tylenol regularly\par Will call daily for now\par Letter written for work, should not return before May 6.

## 2020-04-30 NOTE — HISTORY OF PRESENT ILLNESS
[FreeTextEntry1] : Spoke with patient\par \par Day 9\par \par Felt good yesterday, worse again today, but not as bad as it was in the beginning\par \par No diarrhea\par no fevers\par off tylenol\par Was dypsnec when walking to his car yesteday but fine at rest\par mild cough\par no myalgias\par \par lump on testicle has lessened, no pain, no redness

## 2020-05-02 NOTE — PLAN
[FreeTextEntry1] : 53 y/o male HTN with COVID infection Day 11\par \par Has also had severe, intermittent diarrhea\par Was treated with doxy and levaquin?? In march\par Recently prescribed Bactrim DS which he took this week, completed yesterday\par \par Diarrhea may be COVID related, abx asscoiated, Cdiff also a possibility\par Will follow, if not resolving, will need c diff testing\par \par other COVID symptoms stable

## 2020-05-02 NOTE — HISTORY OF PRESENT ILLNESS
[FreeTextEntry1] : Had diarrhea yesterday in abdundance.\par \par No fevers\par no breathing\par Still fatigue with standing\par BP normal. \par \par

## 2020-05-04 NOTE — HISTORY OF PRESENT ILLNESS
[FreeTextEntry1] : Notes improvement, was able to get outside to read a book\par \par No further diarrhea.\par \par Still fatigued.\par \par Of note, feels very thirsty, drinks about 12 20z bottles of water a day (about 3-4L), some low sugra gatorade.  Reports urine is clear and is urinating a lot.\par \par \par \par

## 2020-05-04 NOTE — PLAN
[FreeTextEntry1] : 53 y/o male, HTN covid infection, day 13 of symptoms, improved but still with fatigue. Diarreha resolved, may have been COVID related or secondary to abx. Advised continued rest for another week, may return to work on May 11th,  New letter written will email to patient. \par \par Advised adding in some higher electrolyte fluids for now (bouilloin or pedialyte), will continue to monitor,  May need BMP in coming days if persists.

## 2020-05-06 NOTE — HISTORY OF PRESENT ILLNESS
[FreeTextEntry1] : Day 15\par \par No further diarrhea\par \par Still mildly fatigued, but improving\par \par Plans to return to work\par \par Still feeling "dehydrated" fluid intake the same

## 2020-05-06 NOTE — PLAN
[FreeTextEntry1] : 53 y/o male HTN COVID infection day 15, overall improved including resolution of diarrhea\par -advised decreasing fluid intake as tolerated back to pre-covid levels\par -will need f/u for HTN etc in mid summer\par -patient will call if any further symptoms concerns.

## 2020-05-08 DIAGNOSIS — Z30.2 ENCOUNTER FOR STERILIZATION: ICD-10-CM

## 2020-05-11 ENCOUNTER — TRANSCRIPTION ENCOUNTER (OUTPATIENT)
Age: 55
End: 2020-05-11

## 2020-05-11 NOTE — HISTORY OF PRESENT ILLNESS
[FreeTextEntry1] : Reports he woke up on saturday with "very heavy feet" from knees down - had pain when he walked in both legs, no swelling, no redness.  Symptoms have resolved as of today.\par \par Feels much better today, no further leg symptoms today.\par \par Feels he will be ready to return to work on Wednesday 5/13.  \par \par needs updated letter.  \par \par No diarrhea, and thirst back to normal.

## 2020-05-11 NOTE — PLAN
[FreeTextEntry1] : Recovery from COVID infection\par -return to work 5/13\par -Antibody testing ordered

## 2020-05-26 ENCOUNTER — NON-APPOINTMENT (OUTPATIENT)
Age: 55
End: 2020-05-26

## 2020-05-26 ENCOUNTER — RX RENEWAL (OUTPATIENT)
Age: 55
End: 2020-05-26

## 2020-06-05 ENCOUNTER — APPOINTMENT (OUTPATIENT)
Dept: UROLOGY | Facility: CLINIC | Age: 55
End: 2020-06-05

## 2020-06-18 PROBLEM — N50.819 ORCHALGIA: Status: ACTIVE | Noted: 2020-03-10

## 2020-10-16 ENCOUNTER — APPOINTMENT (OUTPATIENT)
Dept: INTERNAL MEDICINE | Facility: CLINIC | Age: 55
End: 2020-10-16

## 2020-12-16 ENCOUNTER — APPOINTMENT (OUTPATIENT)
Dept: CT IMAGING | Facility: IMAGING CENTER | Age: 55
End: 2020-12-16
Payer: COMMERCIAL

## 2020-12-16 ENCOUNTER — OUTPATIENT (OUTPATIENT)
Dept: OUTPATIENT SERVICES | Facility: HOSPITAL | Age: 55
LOS: 1 days | End: 2020-12-16
Payer: COMMERCIAL

## 2020-12-16 ENCOUNTER — APPOINTMENT (OUTPATIENT)
Dept: CT IMAGING | Facility: IMAGING CENTER | Age: 55
End: 2020-12-16

## 2020-12-16 DIAGNOSIS — Z98.89 OTHER SPECIFIED POSTPROCEDURAL STATES: Chronic | ICD-10-CM

## 2020-12-16 DIAGNOSIS — Z00.00 ENCOUNTER FOR GENERAL ADULT MEDICAL EXAMINATION WITHOUT ABNORMAL FINDINGS: ICD-10-CM

## 2020-12-16 PROCEDURE — 74177 CT ABD & PELVIS W/CONTRAST: CPT | Mod: 26

## 2020-12-16 PROCEDURE — 82565 ASSAY OF CREATININE: CPT

## 2020-12-16 PROCEDURE — 74177 CT ABD & PELVIS W/CONTRAST: CPT

## 2021-01-11 ENCOUNTER — APPOINTMENT (OUTPATIENT)
Dept: INTERNAL MEDICINE | Facility: CLINIC | Age: 56
End: 2021-01-11
Payer: COMMERCIAL

## 2021-01-20 ENCOUNTER — LABORATORY RESULT (OUTPATIENT)
Age: 56
End: 2021-01-20

## 2021-01-20 ENCOUNTER — APPOINTMENT (OUTPATIENT)
Dept: DISASTER EMERGENCY | Facility: CLINIC | Age: 56
End: 2021-01-20

## 2021-01-25 ENCOUNTER — APPOINTMENT (OUTPATIENT)
Dept: MRI IMAGING | Facility: CLINIC | Age: 56
End: 2021-01-25
Payer: COMMERCIAL

## 2021-01-25 ENCOUNTER — OUTPATIENT (OUTPATIENT)
Dept: OUTPATIENT SERVICES | Facility: HOSPITAL | Age: 56
LOS: 1 days | End: 2021-01-25
Payer: COMMERCIAL

## 2021-01-25 ENCOUNTER — RESULT REVIEW (OUTPATIENT)
Age: 56
End: 2021-01-25

## 2021-01-25 DIAGNOSIS — Z98.89 OTHER SPECIFIED POSTPROCEDURAL STATES: Chronic | ICD-10-CM

## 2021-01-25 DIAGNOSIS — Z00.8 ENCOUNTER FOR OTHER GENERAL EXAMINATION: ICD-10-CM

## 2021-01-25 PROCEDURE — A9585: CPT

## 2021-01-25 PROCEDURE — 74183 MRI ABD W/O CNTR FLWD CNTR: CPT | Mod: 26

## 2021-01-25 PROCEDURE — 74183 MRI ABD W/O CNTR FLWD CNTR: CPT

## 2021-02-04 ENCOUNTER — LABORATORY RESULT (OUTPATIENT)
Age: 56
End: 2021-02-04

## 2021-02-04 ENCOUNTER — APPOINTMENT (OUTPATIENT)
Dept: INTERNAL MEDICINE | Facility: CLINIC | Age: 56
End: 2021-02-04
Payer: COMMERCIAL

## 2021-02-04 VITALS
WEIGHT: 220 LBS | OXYGEN SATURATION: 98 % | BODY MASS INDEX: 33.34 KG/M2 | DIASTOLIC BLOOD PRESSURE: 80 MMHG | TEMPERATURE: 98.6 F | SYSTOLIC BLOOD PRESSURE: 140 MMHG | HEART RATE: 89 BPM | HEIGHT: 68 IN

## 2021-02-04 PROCEDURE — 99072 ADDL SUPL MATRL&STAF TM PHE: CPT

## 2021-02-04 PROCEDURE — 36415 COLL VENOUS BLD VENIPUNCTURE: CPT

## 2021-02-04 PROCEDURE — 99396 PREV VISIT EST AGE 40-64: CPT | Mod: 25

## 2021-02-04 RX ORDER — SULFAMETHOXAZOLE AND TRIMETHOPRIM 800; 160 MG/1; MG/1
800-160 TABLET ORAL
Qty: 14 | Refills: 0 | Status: DISCONTINUED | COMMUNITY
Start: 2020-04-20 | End: 2021-02-04

## 2021-02-04 RX ORDER — METHYLPREDNISOLONE 4 MG/1
4 TABLET ORAL
Qty: 1 | Refills: 0 | Status: DISCONTINUED | COMMUNITY
Start: 2020-05-08 | End: 2021-02-04

## 2021-02-04 RX ORDER — ALPRAZOLAM 0.5 MG/1
0.5 TABLET ORAL
Qty: 2 | Refills: 0 | Status: DISCONTINUED | COMMUNITY
Start: 2020-01-23 | End: 2021-02-04

## 2021-02-04 RX ORDER — LEVOFLOXACIN 500 MG/1
500 TABLET, FILM COATED ORAL DAILY
Qty: 14 | Refills: 0 | Status: DISCONTINUED | COMMUNITY
Start: 2020-03-13 | End: 2021-02-04

## 2021-02-04 RX ORDER — DOXYCYCLINE 100 MG/1
100 CAPSULE ORAL TWICE DAILY
Qty: 28 | Refills: 0 | Status: DISCONTINUED | COMMUNITY
Start: 2020-03-13 | End: 2021-02-04

## 2021-02-08 LAB
ALBUMIN SERPL ELPH-MCNC: 5.2 G/DL
ALDOSTERONE SERUM: 13.3 NG/DL
ALP BLD-CCNC: 118 U/L
ALT SERPL-CCNC: 89 U/L
ANION GAP SERPL CALC-SCNC: 15 MMOL/L
AST SERPL-CCNC: 57 U/L
BASOPHILS # BLD AUTO: 0.04 K/UL
BASOPHILS NFR BLD AUTO: 0.5 %
BILIRUB SERPL-MCNC: 0.5 MG/DL
BUN SERPL-MCNC: 19 MG/DL
CALCIUM SERPL-MCNC: 10.5 MG/DL
CHLORIDE SERPL-SCNC: 104 MMOL/L
CHOLEST SERPL-MCNC: 161 MG/DL
CO2 SERPL-SCNC: 22 MMOL/L
CREAT SERPL-MCNC: 1.12 MG/DL
EOSINOPHIL # BLD AUTO: 0.15 K/UL
EOSINOPHIL NFR BLD AUTO: 1.7 %
ESTIMATED AVERAGE GLUCOSE: 117 MG/DL
GLUCOSE SERPL-MCNC: 97 MG/DL
HBA1C MFR BLD HPLC: 5.7 %
HCT VFR BLD CALC: 51 %
HDLC SERPL-MCNC: 40 MG/DL
HGB BLD-MCNC: 16.8 G/DL
IMM GRANULOCYTES NFR BLD AUTO: 0.3 %
LDLC SERPL CALC-MCNC: 89 MG/DL
LYMPHOCYTES # BLD AUTO: 2.44 K/UL
LYMPHOCYTES NFR BLD AUTO: 28 %
MAN DIFF?: NORMAL
MCHC RBC-ENTMCNC: 29.2 PG
MCHC RBC-ENTMCNC: 32.9 GM/DL
MCV RBC AUTO: 88.5 FL
MONOCYTES # BLD AUTO: 0.71 K/UL
MONOCYTES NFR BLD AUTO: 8.2 %
NEUTROPHILS # BLD AUTO: 5.34 K/UL
NEUTROPHILS NFR BLD AUTO: 61.3 %
NONHDLC SERPL-MCNC: 121 MG/DL
PLATELET # BLD AUTO: 273 K/UL
POTASSIUM SERPL-SCNC: 4 MMOL/L
PROT SERPL-MCNC: 8.3 G/DL
RBC # BLD: 5.76 M/UL
RBC # FLD: 13.2 %
SARS-COV-2 IGG SERPL IA-ACNC: <3.8 AU/ML
SARS-COV-2 IGG SERPL QL IA: NEGATIVE
SODIUM SERPL-SCNC: 142 MMOL/L
TESTOST SERPL-MCNC: 407 NG/DL
TRIGL SERPL-MCNC: 161 MG/DL
WBC # FLD AUTO: 8.71 K/UL

## 2021-02-08 RX ORDER — SIMVASTATIN 20 MG/1
20 TABLET, FILM COATED ORAL DAILY
Qty: 90 | Refills: 2 | Status: DISCONTINUED | COMMUNITY
Start: 2018-09-20 | End: 2021-02-08

## 2021-02-08 NOTE — PHYSICAL EXAM
[No Acute Distress] : no acute distress [Well-Appearing] : well-appearing [Normal Sclera/Conjunctiva] : normal sclera/conjunctiva [No Respiratory Distress] : no respiratory distress  [Clear to Auscultation] : lungs were clear to auscultation bilaterally [Normal Rate] : normal rate  [Regular Rhythm] : with a regular rhythm [Normal S1, S2] : normal S1 and S2 [No Murmur] : no murmur heard [Soft] : abdomen soft [No Edema] : there was no peripheral edema [Non Tender] : non-tender [Non-distended] : non-distended [No Masses] : no abdominal mass palpated [Normal Supraclavicular Nodes] : no supraclavicular lymphadenopathy [Normal Anterior Cervical Nodes] : no anterior cervical lymphadenopathy [No CVA Tenderness] : no CVA  tenderness

## 2021-02-08 NOTE — HISTORY OF PRESENT ILLNESS
[de-identified] : 54 y/o male for CPE\par Last office visit 12/2019\par \par Hx of HTN, HLD, Transaminitis, Hpylori\par s/p vasectomy 1/24/20, developed orchalgia/orchitis in March, treated with doxy\par Had COVID illness in APril\par \par Had EGD/CLS on 9/11, diagnosed with hpylori, treated with abx, to be retested this month\par \par HA: pounding 101/10 up on awakening, happens 1x week, takes excedrin, and it resolves after 3-5 minutes\par \par Has left lateral side pain, takes a blue pill for it.  Occurs a few times a week, when he eats only (was more frequent).  \par \par Recent CT ordered ([facility])  for LLQ pain 12/16/2020: diverticulosis, not diverticulitis.  \par -adrenal nodule 2.1 cm left (MR?CT recommended to further characterize\par -2.4 cm Lft kidney cyst\par MRI done ([facility]) 1/25/21:  bilateral simple renal cysts, left adrenal adenoma, marked hepatic steatosis\par HCM:\par CRC screening: had CLS, no report yet.  \par

## 2021-02-08 NOTE — ASSESSMENT
[FreeTextEntry1] : HTN:  borderline controlled on amlodipine 10\par \par HA: intermittent, upon awakening, had prior HA ehen BP not well controlled.  advised home testing to see if HA related to BP, may be a role for additional agents.  \par \par HLD: labs today, has been on simva in past, will change to atrova given also on amlodipine\par \par Transaminitis:  check labs today, MR with significant steatosis, needs weight loss, aggressive HLD management\par \par Renal Cyst: 2.4 cm left, 1cm Right MR- bilateral simple cysts, will follow.\par \par Adrenal Cyst: 2.1 cm on left MRI states adrenal adenoma - will check hormonal studies (renin, denis, cortisol, DHEAS, fractionated metanephrines and catecholamines)\par \par HCM:\par Colorectal cancer screening: [place] - need report\par Metabolic screening as noted\par \par \par

## 2021-02-23 LAB
CORTICOSTEROID BIND GLOBULIN: 2.3 MG/DL
CORTIS SERPL-MCNC: 8 UG/DL
CORTISOL, FREE: 0.34 UG/DL
CREAT ?TM UR-MCNC: 247 MG/DL
DHEA-SULFATE, SERUM: 85 UG/DL
DOPAMINE/CREAT UR: 197 MCG/G CR
EPINEPH/CREAT UR: 6 MCG/G CR
EPINEPHRINE/NOEPINEPHRINE CALC TOTAL RAN: 80 MCG/G CR
NOREPINEPH/CREAT UR: 74 MCG/G CR
PFCX: 4.2 %
RENIN ACTIVITY, PLASMA: 1.15 NG/ML/HR

## 2021-02-24 ENCOUNTER — NON-APPOINTMENT (OUTPATIENT)
Age: 56
End: 2021-02-24

## 2021-03-19 ENCOUNTER — APPOINTMENT (OUTPATIENT)
Dept: ENDOCRINOLOGY | Facility: CLINIC | Age: 56
End: 2021-03-19
Payer: COMMERCIAL

## 2021-03-19 ENCOUNTER — LABORATORY RESULT (OUTPATIENT)
Age: 56
End: 2021-03-19

## 2021-03-19 VITALS
DIASTOLIC BLOOD PRESSURE: 99 MMHG | TEMPERATURE: 98.6 F | WEIGHT: 215 LBS | HEIGHT: 67.52 IN | HEART RATE: 101 BPM | SYSTOLIC BLOOD PRESSURE: 140 MMHG | OXYGEN SATURATION: 95 % | BODY MASS INDEX: 32.96 KG/M2

## 2021-03-19 PROCEDURE — 36415 COLL VENOUS BLD VENIPUNCTURE: CPT

## 2021-03-19 PROCEDURE — 99205 OFFICE O/P NEW HI 60 MIN: CPT | Mod: 25

## 2021-03-19 PROCEDURE — 99072 ADDL SUPL MATRL&STAF TM PHE: CPT

## 2021-03-21 NOTE — REASON FOR VISIT
[Consultation] : a consultation visit [Adrenal Evaluation/Adrenal Disorder] : adrenal evaluation/adrenal disorder [FreeTextEntry2] : Dr. Peng

## 2021-03-21 NOTE — HISTORY OF PRESENT ILLNESS
[FreeTextEntry1] : CC: Adrenal problem\par This is a 55-year-old male with prediabetes, obesity, fatty liver, H. pylori, COVID-19 infection in April 2020, hypertension, hyperlipidemia, here for evaluation of left adrenal adenoma.\par CT abdomen pelvis for left-sided abdominal pain on December 16, 2020 showed left 2.1 cm adrenal nodule that cannot be characterized.\par MRI abdomen on January 25, 2021 showed left adrenal adenoma.\par Work-up showed normal renin level, DHEA-S, aldosterone, random urine metanephrines.\par Random urine norepinephrine was minimally elevated (74).\par

## 2021-03-21 NOTE — ASSESSMENT
[FreeTextEntry1] : This is a 55-year-old male with prediabetes, obesity, fatty liver, H. pylori, COVID-19 infection in April 2020, here for evaluation of left adrenal adenoma.\par 1.  Left 2.1 cm adrenal adenoma\par CT abdomen pelvis for left-sided abdominal pain on December 16, 2020 showed left 2.1 cm adrenal nodule that cannot be characterized.\par MRI abdomen on January 25, 2021 showed left adrenal adenoma.\par Work-up showed normal renin level, DHEA-S, aldosterone, random urine metanephrines.\par Random urine norepinephrine was minimally elevated (74).  This is likely not clinically significant.  Check catecholamine fractionation.\par Check 24-hour urine cortisol to evaluate for subclinical Cushing syndrome.\par 2.  Prediabetes/obesity\par Lifestyle modification.\par \par

## 2021-03-21 NOTE — CONSULT LETTER
[Dear  ___] : Dear  [unfilled], [Consult Letter:] : I had the pleasure of evaluating your patient, [unfilled]. [Please see my note below.] : Please see my note below. [Consult Closing:] : Thank you very much for allowing me to participate in the care of this patient.  If you have any questions, please do not hesitate to contact me. [Sincerely,] : Sincerely, [FreeTextEntry3] : Audra Last MD, FACE\par

## 2021-03-21 NOTE — REVIEW OF SYSTEMS
[All other systems negative] : All other systems negative [FreeTextEntry7] : Left-sided abdominal pain

## 2021-03-21 NOTE — PHYSICAL EXAM
[Alert] : alert [Well Nourished] : well nourished [Healthy Appearance] : healthy appearance [Obese] : obese [No Acute Distress] : no acute distress [Well Developed] : well developed [Normal Voice/Communication] : normal voice communication [Normal Sclera/Conjunctiva] : normal sclera/conjunctiva [No Proptosis] : no proptosis [No Neck Mass] : no neck mass was observed [No LAD] : no lymphadenopathy [Supple] : the neck was supple [Thyroid Not Enlarged] : the thyroid was not enlarged [No Thyroid Nodules] : no palpable thyroid nodules [No Respiratory Distress] : no respiratory distress [Normal Rate] : heart rate was normal [Not Distended] : not distended [No Stigmata of Cushings Syndrome] : no stigmata of Cushings Syndrome [Normal Gait] : normal gait [No Clubbing, Cyanosis] : no clubbing  or cyanosis of the fingernails [No Involuntary Movements] : no involuntary movements were seen [Normal Affect] : the affect was normal [Normal Insight/Judgement] : insight and judgment were intact [Normal Mood] : the mood was normal

## 2021-03-22 ENCOUNTER — NON-APPOINTMENT (OUTPATIENT)
Age: 56
End: 2021-03-22

## 2021-03-22 ENCOUNTER — APPOINTMENT (OUTPATIENT)
Dept: CT IMAGING | Facility: IMAGING CENTER | Age: 56
End: 2021-03-22

## 2021-03-27 ENCOUNTER — TRANSCRIPTION ENCOUNTER (OUTPATIENT)
Age: 56
End: 2021-03-27

## 2021-03-30 ENCOUNTER — NON-APPOINTMENT (OUTPATIENT)
Age: 56
End: 2021-03-30

## 2021-04-14 ENCOUNTER — RX RENEWAL (OUTPATIENT)
Age: 56
End: 2021-04-14

## 2021-06-09 ENCOUNTER — APPOINTMENT (OUTPATIENT)
Dept: INFECTIOUS DISEASE | Facility: CLINIC | Age: 56
End: 2021-06-09
Payer: COMMERCIAL

## 2021-06-09 VITALS
HEIGHT: 67.2 IN | OXYGEN SATURATION: 96 % | BODY MASS INDEX: 34.13 KG/M2 | SYSTOLIC BLOOD PRESSURE: 136 MMHG | TEMPERATURE: 97.2 F | DIASTOLIC BLOOD PRESSURE: 77 MMHG | WEIGHT: 220 LBS | HEART RATE: 94 BPM

## 2021-06-09 DIAGNOSIS — A04.8 OTHER SPECIFIED BACTERIAL INTESTINAL INFECTIONS: ICD-10-CM

## 2021-06-09 PROCEDURE — 99205 OFFICE O/P NEW HI 60 MIN: CPT

## 2021-06-09 PROCEDURE — 99072 ADDL SUPL MATRL&STAF TM PHE: CPT

## 2021-06-09 RX ORDER — BISMUTH SUBCITRATE POTASSIUM, METRONIDAZOLE, AND TETRACYCLINE HYDROCHLORIDE 140; 125; 125 MG/1; MG/1; MG/1
140-125-125 CAPSULE ORAL
Qty: 120 | Refills: 0 | Status: DISCONTINUED | COMMUNITY
Start: 2020-12-14 | End: 2021-06-09

## 2021-06-09 RX ORDER — LEVOFLOXACIN 250 MG/1
250 TABLET, FILM COATED ORAL
Qty: 10 | Refills: 0 | Status: DISCONTINUED | COMMUNITY
Start: 2021-03-08 | End: 2021-06-09

## 2021-06-09 NOTE — CONSULT LETTER
[Dear  ___] : Dear ~SUSANA, [Consult Letter:] : I had the pleasure of evaluating your patient, [unfilled]. [Please see my note below.] : Please see my note below. [Sincerely,] : Sincerely, [FreeTextEntry2] : Dr. MIKEL Mistry [FreeTextEntry1] : H [FreeTextEntry3] : Pj

## 2021-06-09 NOTE — ASSESSMENT
[FreeTextEntry1] : Treatment failure x3 reported by patient.\par Pre-exposure with multiple antibitoic classes increased possibility of resistance.\par In the setting of anaphyloaxis/immediate hypersensitivity to penicillins, the vast majority of these reactions do not recur when re challenged >10 years afterward. If able would consider using Amox-Isabel-PPI regimen as patient has not seen it before after having skin testing for PCN allergy to assess risk.\par \par If there is still persistent risk of serious allergy based on testing, would pursue EGD and send isolate for culture and sensitivity. Latter is not routinely available at most labs. I've reached out to [facility] to see if the have the capabiilty to do so. \par \par Will review case with Dr. Misrty.\par \par D/W Patient in layman's terms. Nature/consequences of H. pylorin infection addressed. All questions answered to the best of my abiltiy.

## 2021-06-09 NOTE — PHYSICAL EXAM
[General Appearance - Alert] : alert [General Appearance - In No Acute Distress] : in no acute distress [Sclera] : the sclera and conjunctiva were normal [Oropharynx] : the oropharynx was normal with no thrush [Neck Appearance] : the appearance of the neck was normal [Neck Cervical Mass (___cm)] : no neck mass was observed [Auscultation Breath Sounds / Voice Sounds] : lungs were clear to auscultation bilaterally [Heart Sounds] : normal S1 and S2 [Edema] : there was no peripheral edema [Bowel Sounds] : normal bowel sounds [Abdomen Soft] : soft [Abdomen Tenderness] : non-tender [No Palpable Adenopathy] : no palpable adenopathy [Musculoskeletal - Swelling] : no joint swelling [Nail Clubbing] : no clubbing  or cyanosis of the fingernails [] : no rash [FreeTextEntry1] : Tattoos. Partial alopecia [Oriented To Time, Place, And Person] : oriented to person, place, and time [Affect] : the affect was normal

## 2021-06-09 NOTE — REVIEW OF SYSTEMS
[As Noted in HPI] : as noted in HPI [Vomiting] : no vomiting [Constipation] : no constipation [Diarrhea] : no diarrhea [Negative] : Heme/Lymph [FreeTextEntry1] : ROS otherwise negative

## 2021-06-09 NOTE — HISTORY OF PRESENT ILLNESS
[FreeTextEntry1] : 51M presents for positive H. pyolri test despite treatment x3. He took pylera courses twice, does not remember the  other regimen (perhaps Talicia, though he has a history of anaphylaxis w amoxicillin?). Has L-midepigastric abdominal discomfort and GERD symptoms. No relation to particular foods. No fevers, chills, or rigors. No other complaints at this time. \par \par Current symptoms present for 3-4 months per his report though may have been going on longer as looks as if he \par \par Patient states that he had angioedema/hives with penicillin as a child. Does not recall if he has taken any penicillins since. He does not think so.\par \par Patient also treated with levofloxacin/flagyl for diverticulitis as well.  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

## 2021-07-15 ENCOUNTER — RX RENEWAL (OUTPATIENT)
Age: 56
End: 2021-07-15

## 2021-08-27 ENCOUNTER — TRANSCRIPTION ENCOUNTER (OUTPATIENT)
Age: 56
End: 2021-08-27

## 2021-08-27 ENCOUNTER — NON-APPOINTMENT (OUTPATIENT)
Age: 56
End: 2021-08-27

## 2021-08-27 DIAGNOSIS — U07.1 COVID-19: ICD-10-CM

## 2021-08-27 NOTE — PLAN
[FreeTextEntry1] : 54 y/o male with HTN, HLD, Obesity, Vaccinated with COVID in March 2021, with \par Viral symptoms starting 8/24\par COVID + 8/27\par Day 4 of symptoms\par \par COugh, congestion, fatigue\par Interested in MAB, will email me testing results.  \par Advised him to get pulse ox, thermometer, tylenol\par Quarantine from family members\par Advised careful monitoring of temperature, symptoms, especially SOB/cough. Should limit contact with others, increase hydration and rest, tylenol for fever, myalgia. Should call if symptoms worsen or if questions arise. \par \par ***Addendum:  Results received, patient referred and is scheduled for MAB on 8/30 at 10:30am**

## 2021-08-30 ENCOUNTER — APPOINTMENT (OUTPATIENT)
Dept: DISASTER EMERGENCY | Facility: HOSPITAL | Age: 56
End: 2021-08-30

## 2021-08-30 ENCOUNTER — OUTPATIENT (OUTPATIENT)
Dept: INPATIENT UNIT | Facility: HOSPITAL | Age: 56
LOS: 1 days | End: 2021-08-30
Payer: COMMERCIAL

## 2021-08-30 VITALS
SYSTOLIC BLOOD PRESSURE: 105 MMHG | RESPIRATION RATE: 16 BRPM | HEIGHT: 68 IN | TEMPERATURE: 98 F | WEIGHT: 199.96 LBS | HEART RATE: 73 BPM | DIASTOLIC BLOOD PRESSURE: 71 MMHG | OXYGEN SATURATION: 98 %

## 2021-08-30 VITALS
SYSTOLIC BLOOD PRESSURE: 110 MMHG | OXYGEN SATURATION: 99 % | RESPIRATION RATE: 17 BRPM | DIASTOLIC BLOOD PRESSURE: 75 MMHG | TEMPERATURE: 98 F | HEART RATE: 78 BPM

## 2021-08-30 DIAGNOSIS — Z98.89 OTHER SPECIFIED POSTPROCEDURAL STATES: Chronic | ICD-10-CM

## 2021-08-30 DIAGNOSIS — U07.1 COVID-19: ICD-10-CM

## 2021-08-30 PROCEDURE — M0243: CPT

## 2021-08-30 RX ORDER — SODIUM CHLORIDE 9 MG/ML
500 INJECTION INTRAMUSCULAR; INTRAVENOUS; SUBCUTANEOUS ONCE
Refills: 0 | Status: COMPLETED | OUTPATIENT
Start: 2021-08-30 | End: 2021-08-30

## 2021-08-30 RX ORDER — SODIUM CHLORIDE 9 MG/ML
250 INJECTION INTRAMUSCULAR; INTRAVENOUS; SUBCUTANEOUS
Refills: 0 | Status: COMPLETED | OUTPATIENT
Start: 2021-08-30 | End: 2021-08-30

## 2021-08-30 RX ADMIN — SODIUM CHLORIDE 500 MILLILITER(S): 9 INJECTION INTRAMUSCULAR; INTRAVENOUS; SUBCUTANEOUS at 11:46

## 2021-08-30 RX ADMIN — SODIUM CHLORIDE 25 MILLILITER(S): 9 INJECTION INTRAMUSCULAR; INTRAVENOUS; SUBCUTANEOUS at 11:25

## 2021-08-30 NOTE — CHART NOTE - NSCHARTNOTEFT_GEN_A_CORE
CC: Monoclonal Antibody Infusion/COVID 19 Positive      History: Patient presents for infusion of monoclonal antibody infusion. Patient has been screened and was deemed to be a candidate.    Symptoms/ Criteria: Cough, loss of taste/ smell. body aches, headaches, malaise    Inclusion Criteria: BMI> 25  Positive Test Date: 8/26/21  Date of Symptom Onset: 8/25    casirivimab/imdevimab in sodium chloride 0.9% (EUA) IVPB 100 milliLiter(s) IV Intermittent once  sodium chloride 0.9%. 250 milliLiter(s) IV Continuous <Continuous>      PMHx:  Infection due to severe acute respiratory syndrome coronavirus 2 (SARS-CoV-2)    Family history of heart attack (Father)    Chronic GERD    Mixed hyperlipidemia    S/P arthroscopy          T(C): 36.6 (08-30-21 @ 10:16), Max: 36.6 (08-30-21 @ 10:16)  HR: 73 (08-30-21 @ 10:16) (73 - 73)  BP: 105/71 (08-30-21 @ 10:16) (105/71 - 105/71)  RR: 16 (08-30-21 @ 10:16) (16 - 16)  SpO2: 98% (08-30-21 @ 10:16) (98% - 98%)      PE:   Appearance: NAD	  HEENT:   Normal oral mucosa.   Lymphatic: No lymphadenopathy  Cardiovascular: Normal S1 S2, No JVD, No murmurs, No edema  Respiratory: Lungs clear to auscultation	  Gastrointestinal:  Soft, Non-tender. No guarding   Skin: warm and dry  Neurologic: Non-focal  Extremities: Normal range of motion.     ASSESSMENT:  Pt is a 55y year old Male with PMH Covid +  referred by Sharon Peng to the infusion center for Monoclonal antibody infusion (Regeneron).        PLAN:  - infusion procedure explained to patient   - Consent for monoclonal antibody infusion obtained   - Risk & benefits discussed/all questions answered  - infuse Regeneron over twenty-one minutes   - will observe patient for one hour post infusion  and then if stable discharge home with oupt follow up as planned by PMD.    POST INFUSION ASSESSMENT:   DISCHARGE at approximately  XXX  hours    - Patient tolerated infusion well denies complaints of chest pain/SOB/dizzines/ palps  - VSS for discharge home  - D/C instructions given/ fact sheet included.  - Patient to follow-up with PCP as needed. CC: Monoclonal Antibody Infusion/COVID 19 Positive      History: Patient presents for infusion of monoclonal antibody infusion. Patient has been screened and was deemed to be a candidate.    Symptoms/ Criteria: Cough, loss of taste/ smell. body aches, headaches, malaise    Inclusion Criteria: BMI> 25  Positive Test Date: 8/26/21  Date of Symptom Onset: 8/25    casirivimab/imdevimab in sodium chloride 0.9% (EUA) IVPB 100 milliLiter(s) IV Intermittent once  sodium chloride 0.9%. 250 milliLiter(s) IV Continuous <Continuous>      PMHx:  Infection due to severe acute respiratory syndrome coronavirus 2 (SARS-CoV-2)    Family history of heart attack (Father)    Chronic GERD    Mixed hyperlipidemia    S/P arthroscopy          T(C): 36.6 (08-30-21 @ 10:16), Max: 36.6 (08-30-21 @ 10:16)  HR: 73 (08-30-21 @ 10:16) (73 - 73)  BP: 105/71 (08-30-21 @ 10:16) (105/71 - 105/71)  RR: 16 (08-30-21 @ 10:16) (16 - 16)  SpO2: 98% (08-30-21 @ 10:16) (98% - 98%)      PE:   Appearance: NAD	  HEENT:   Normal oral mucosa.   Lymphatic: No lymphadenopathy  Cardiovascular: Normal S1 S2, No JVD, No murmurs, No edema  Respiratory: Lungs clear to auscultation	  Gastrointestinal:  Soft, Non-tender. No guarding   Skin: warm and dry  Neurologic: Non-focal  Extremities: Normal range of motion.     ASSESSMENT:  Pt is a 55y year old Male with PMH Covid +  referred by Sharon Peng to the infusion center for Monoclonal antibody infusion (Regeneron).        PLAN:  - infusion procedure explained to patient   - Consent for monoclonal antibody infusion obtained   - Risk & benefits discussed/all questions answered  - infuse Regeneron over twenty-one minutes   - will observe patient for one hour post infusion  and then if stable discharge home with oupt follow up as planned by PMD.    POST INFUSION ASSESSMENT:   DISCHARGE at approximately  12:40  hours    - Patient tolerated infusion well denies complaints of chest pain/SOB/dizziness/ palps  - VSS for discharge home  - D/C instructions given/ fact sheet included.  - Patient to follow-up with PCP as needed.

## 2021-09-07 ENCOUNTER — NON-APPOINTMENT (OUTPATIENT)
Age: 56
End: 2021-09-07

## 2021-09-16 ENCOUNTER — APPOINTMENT (OUTPATIENT)
Dept: ENDOCRINOLOGY | Facility: CLINIC | Age: 56
End: 2021-09-16

## 2021-09-23 ENCOUNTER — RESULT REVIEW (OUTPATIENT)
Age: 56
End: 2021-09-23

## 2021-12-14 ENCOUNTER — RX RENEWAL (OUTPATIENT)
Age: 56
End: 2021-12-14

## 2022-04-04 ENCOUNTER — RESULT REVIEW (OUTPATIENT)
Age: 57
End: 2022-04-04

## 2022-04-04 ENCOUNTER — OUTPATIENT (OUTPATIENT)
Dept: OUTPATIENT SERVICES | Facility: HOSPITAL | Age: 57
LOS: 1 days | End: 2022-04-04
Payer: COMMERCIAL

## 2022-04-04 ENCOUNTER — APPOINTMENT (OUTPATIENT)
Dept: MRI IMAGING | Facility: CLINIC | Age: 57
End: 2022-04-04
Payer: COMMERCIAL

## 2022-04-04 DIAGNOSIS — Z98.89 OTHER SPECIFIED POSTPROCEDURAL STATES: Chronic | ICD-10-CM

## 2022-04-04 DIAGNOSIS — Z00.8 ENCOUNTER FOR OTHER GENERAL EXAMINATION: ICD-10-CM

## 2022-04-04 PROCEDURE — 74183 MRI ABD W/O CNTR FLWD CNTR: CPT

## 2022-04-04 PROCEDURE — A9585: CPT

## 2022-04-04 PROCEDURE — 74183 MRI ABD W/O CNTR FLWD CNTR: CPT | Mod: 26

## 2022-04-28 ENCOUNTER — APPOINTMENT (OUTPATIENT)
Dept: OTOLARYNGOLOGY | Facility: CLINIC | Age: 57
End: 2022-04-28

## 2022-05-10 ENCOUNTER — RX RENEWAL (OUTPATIENT)
Age: 57
End: 2022-05-10

## 2022-05-25 ENCOUNTER — RX RENEWAL (OUTPATIENT)
Age: 57
End: 2022-05-25

## 2022-07-11 ENCOUNTER — APPOINTMENT (OUTPATIENT)
Dept: OTOLARYNGOLOGY | Facility: CLINIC | Age: 57
End: 2022-07-11

## 2022-07-11 VITALS
BODY MASS INDEX: 34.13 KG/M2 | DIASTOLIC BLOOD PRESSURE: 78 MMHG | WEIGHT: 220 LBS | HEIGHT: 67.2 IN | TEMPERATURE: 98.3 F | SYSTOLIC BLOOD PRESSURE: 124 MMHG | HEART RATE: 71 BPM

## 2022-07-11 DIAGNOSIS — R43.0 ANOSMIA: ICD-10-CM

## 2022-07-11 DIAGNOSIS — R43.8 OTHER DISTURBANCES OF SMELL AND TASTE: ICD-10-CM

## 2022-07-11 DIAGNOSIS — R43.2 PARAGEUSIA: ICD-10-CM

## 2022-07-11 DIAGNOSIS — U09.9 OTHER DISTURBANCES OF SMELL AND TASTE: ICD-10-CM

## 2022-07-11 PROCEDURE — 31231 NASAL ENDOSCOPY DX: CPT

## 2022-07-11 PROCEDURE — 99203 OFFICE O/P NEW LOW 30 MIN: CPT | Mod: 25

## 2022-07-11 NOTE — ASSESSMENT
[FreeTextEntry1] : Loss of smell and taste following Covid-19 infection:\par - nasal endoscopy with no evidence of polyps or swelling and no sx of nasal congestion\par - Smell training - handout given and discussed with patient\par - F/U PRN\par \par \par \par I personally saw and examined Mr. JOSE ZHENG in detail this visit today. I personally reviewed the HPI, PMH, FH, SH, ROS and medications/allergies. I have spoken to IRWIN Sanchez regarding the history and have personally determined the assessment and plan of care, and documented this myself. I was present and participated in all key portions of the encounter and all procedures noted above. I have made changes in the body of the note where appropriate.\par \par Attesting Faculty: See Attending Signature Below

## 2022-07-11 NOTE — PROCEDURE
[FreeTextEntry6] : Flexible scope #218 was used. Right nasal passage with normal inferior, middle and superior turbinates. Nasal passage patent with clear middle meatus and sphenoethmoid recess. Left nasal passage with normal inferior, middle and superior turbinates. Nasal passage was patent with clear middle meatus and sphenoethmoid recess. No mucopurulence or polyps appreciated. Nasopharynx clear.\par \par

## 2022-07-11 NOTE — HISTORY OF PRESENT ILLNESS
[de-identified] : 57 y/o M, notes had Covid-19 infection August 2021.  Anosmia since that time, with no taste as well.  No nasal congestion.  No sinus pressure.

## 2022-07-11 NOTE — CONSULT LETTER
[Dear  ___] : Dear  [unfilled], [Consult Letter:] : I had the pleasure of evaluating your patient, [unfilled]. [Please see my note below.] : Please see my note below. [Consult Closing:] : Thank you very much for allowing me to participate in the care of this patient.  If you have any questions, please do not hesitate to contact me. [Sincerely,] : Sincerely, [FreeTextEntry3] : Evangelina Conway MD\par Otolaryngology and Cranial Base Surgery\par Attending Physician - Department of Otolaryngology and Head & Neck Surgery\par [facility]\par  - [employer] [facility]\par Office: [phone]\par Fax: [phone]\par

## 2022-08-09 ENCOUNTER — RX RENEWAL (OUTPATIENT)
Age: 57
End: 2022-08-09

## 2022-09-08 ENCOUNTER — RX RENEWAL (OUTPATIENT)
Age: 57
End: 2022-09-08

## 2022-10-05 ENCOUNTER — RESULT REVIEW (OUTPATIENT)
Age: 57
End: 2022-10-05

## 2023-02-07 ENCOUNTER — APPOINTMENT (OUTPATIENT)
Dept: INTERNAL MEDICINE | Facility: CLINIC | Age: 58
End: 2023-02-07

## 2023-03-02 ENCOUNTER — NON-APPOINTMENT (OUTPATIENT)
Age: 58
End: 2023-03-02

## 2023-03-02 ENCOUNTER — APPOINTMENT (OUTPATIENT)
Dept: INTERNAL MEDICINE | Facility: CLINIC | Age: 58
End: 2023-03-02
Payer: COMMERCIAL

## 2023-03-02 VITALS
HEART RATE: 47 BPM | WEIGHT: 216 LBS | SYSTOLIC BLOOD PRESSURE: 153 MMHG | HEIGHT: 67 IN | BODY MASS INDEX: 33.9 KG/M2 | TEMPERATURE: 97.8 F | OXYGEN SATURATION: 96 % | DIASTOLIC BLOOD PRESSURE: 101 MMHG

## 2023-03-02 VITALS — SYSTOLIC BLOOD PRESSURE: 150 MMHG | DIASTOLIC BLOOD PRESSURE: 90 MMHG

## 2023-03-02 DIAGNOSIS — Z78.9 OTHER SPECIFIED HEALTH STATUS: ICD-10-CM

## 2023-03-02 PROCEDURE — 93000 ELECTROCARDIOGRAM COMPLETE: CPT | Mod: 59

## 2023-03-02 PROCEDURE — 36415 COLL VENOUS BLD VENIPUNCTURE: CPT

## 2023-03-02 PROCEDURE — 99215 OFFICE O/P EST HI 40 MIN: CPT | Mod: 25

## 2023-03-02 RX ORDER — SILDENAFIL 100 MG/1
100 TABLET, FILM COATED ORAL
Qty: 18 | Refills: 1 | Status: DISCONTINUED | COMMUNITY
Start: 2019-01-18 | End: 2023-03-02

## 2023-03-02 RX ORDER — PANTOPRAZOLE 20 MG/1
20 TABLET, DELAYED RELEASE ORAL
Qty: 30 | Refills: 0 | Status: DISCONTINUED | COMMUNITY
Start: 2022-12-28 | End: 2023-03-02

## 2023-03-02 RX ORDER — FLUOROURACIL 50 MG/G
5 CREAM TOPICAL
Qty: 40 | Refills: 0 | Status: DISCONTINUED | COMMUNITY
Start: 2021-02-23 | End: 2023-03-02

## 2023-03-02 NOTE — ASSESSMENT
[FreeTextEntry1] : 56 y/o male\par \par Acute stressors/anxiety which he attributes to work\par -denies SI/HI, agrees to reach out if he feels unsafe\par -will provide letter for 4 weeks away from work\par -agrees to [facility] referral to engage in talk therapy\par -is not interested in medications at this time\par \par Check metabolic screening to r/o other causes (like thyroid disease)\par \par HTN uncontrolled, continue amlodipine, metoprolol added as below.\par \par New Afib: hemodynamically stable, not rapid, asymptomatic with the exception of anxiety\par Parox afib in 2016 (see Jovany Mcmanus note in 2016) resolved on own\par Needs Beta Blockade - metoprolol 25 ER to start\par Anticoag - ChadsVASC2 score is 1 - will hold on AC untill discussion with cardiology\par Cardiology Eval tomorrow mornign at 8:45 am.  \par \par Will follow up with patient in coming days.

## 2023-03-02 NOTE — PHYSICAL EXAM
[Normal Sclera/Conjunctiva] : normal sclera/conjunctiva [No Respiratory Distress] : no respiratory distress  [Clear to Auscultation] : lungs were clear to auscultation bilaterally [Speech Grossly Normal] : speech grossly normal [Normal Affect] : the affect was normal [Alert and Oriented x3] : oriented to person, place, and time [Normal Insight/Judgement] : insight and judgment were intact [No Edema] : there was no peripheral edema [de-identified] : irreg irreg, rate 80-90s on exam [de-identified] : nervous

## 2023-03-03 ENCOUNTER — NON-APPOINTMENT (OUTPATIENT)
Age: 58
End: 2023-03-03

## 2023-03-03 ENCOUNTER — APPOINTMENT (OUTPATIENT)
Dept: CARDIOLOGY | Facility: CLINIC | Age: 58
End: 2023-03-03
Payer: COMMERCIAL

## 2023-03-03 VITALS
RESPIRATION RATE: 17 BRPM | HEIGHT: 67 IN | OXYGEN SATURATION: 95 % | DIASTOLIC BLOOD PRESSURE: 88 MMHG | BODY MASS INDEX: 33.74 KG/M2 | SYSTOLIC BLOOD PRESSURE: 118 MMHG | WEIGHT: 215 LBS | HEART RATE: 89 BPM

## 2023-03-03 LAB
ALBUMIN SERPL ELPH-MCNC: 4.9 G/DL
ALP BLD-CCNC: 94 U/L
ALT SERPL-CCNC: 47 U/L
ANION GAP SERPL CALC-SCNC: 18 MMOL/L
AST SERPL-CCNC: 38 U/L
BASOPHILS # BLD AUTO: 0.05 K/UL
BASOPHILS NFR BLD AUTO: 0.6 %
BILIRUB SERPL-MCNC: 0.9 MG/DL
BUN SERPL-MCNC: 19 MG/DL
CALCIUM SERPL-MCNC: 9.9 MG/DL
CHLORIDE SERPL-SCNC: 105 MMOL/L
CHOLEST SERPL-MCNC: 168 MG/DL
CO2 SERPL-SCNC: 20 MMOL/L
CREAT SERPL-MCNC: 0.96 MG/DL
EGFR: 93 ML/MIN/1.73M2
EOSINOPHIL # BLD AUTO: 0.07 K/UL
EOSINOPHIL NFR BLD AUTO: 0.8 %
ESTIMATED AVERAGE GLUCOSE: 120 MG/DL
GLUCOSE SERPL-MCNC: 85 MG/DL
HBA1C MFR BLD HPLC: 5.8 %
HCT VFR BLD CALC: 48.9 %
HDLC SERPL-MCNC: 47 MG/DL
HGB BLD-MCNC: 17 G/DL
IMM GRANULOCYTES NFR BLD AUTO: 0.3 %
LDLC SERPL CALC-MCNC: 105 MG/DL
LYMPHOCYTES # BLD AUTO: 2.51 K/UL
LYMPHOCYTES NFR BLD AUTO: 28.2 %
MAN DIFF?: NORMAL
MCHC RBC-ENTMCNC: 29.8 PG
MCHC RBC-ENTMCNC: 34.8 GM/DL
MCV RBC AUTO: 85.8 FL
MONOCYTES # BLD AUTO: 0.68 K/UL
MONOCYTES NFR BLD AUTO: 7.6 %
NEUTROPHILS # BLD AUTO: 5.57 K/UL
NEUTROPHILS NFR BLD AUTO: 62.5 %
NONHDLC SERPL-MCNC: 121 MG/DL
PLATELET # BLD AUTO: 256 K/UL
POTASSIUM SERPL-SCNC: 3.7 MMOL/L
PROT SERPL-MCNC: 7.5 G/DL
RBC # BLD: 5.7 M/UL
RBC # FLD: 13.5 %
SODIUM SERPL-SCNC: 143 MMOL/L
TRIGL SERPL-MCNC: 82 MG/DL
TSH SERPL-ACNC: 1.93 UIU/ML
WBC # FLD AUTO: 8.91 K/UL

## 2023-03-03 PROCEDURE — 93000 ELECTROCARDIOGRAM COMPLETE: CPT

## 2023-03-03 PROCEDURE — 99204 OFFICE O/P NEW MOD 45 MIN: CPT | Mod: 25

## 2023-03-03 NOTE — DISCUSSION/SUMMARY
[FreeTextEntry1] : He is a 57-year-old with hypertension, GERD and now 2 episodes of atrial flutter/fibrillation.\par We reviewed the pathophysiology of atrial fibrillation with him and his wife Aspen over the phone.\par I suggested that he begin the beta-blocker and have instituted oral anticoagulation given his [id number] score of 1 and the likelihood that he would benefit from electrophysiology intervention to prevent recurrence of his atrial arrhythmia.\par We also discussed the consideration for AMY screening as an AF trigger.\par Referred to Dr. Meza. [EKG obtained to assist in diagnosis and management of assessed problem(s)] : EKG obtained to assist in diagnosis and management of assessed problem(s)

## 2023-03-03 NOTE — HISTORY OF PRESENT ILLNESS
[FreeTextEntry1] : Dear Sharon,\coretta Thank you for referring him for cardiovascular evaluation.  He is a 57-year-old with a history of paroxysmal atrial fibrillation and hypertension who presented to your office with sweating, jitteriness and was noted to be in atrial fibrillation with a rapid ventricular response.\par He reports that approximately 2 weeks ago his work time schedule was changed drastically and he was under severe stress.\par He started reporting feeling shaky and jittery as well as some shortness of breath.\par He had no chest pains, orthopnea, PND or leg edema.\par When he was seen in your office he was noted to be in atrial fibrillation with a rapid ventricular response.\par His previous atrial fibrillation episode occurred in the setting of the operating room and resolved with beta-blockade.\par He has a history of hypertension, GERD and an intestinal mass that is being monitored with annual MRI which has reportedly not grown for the past 2 years.\par He has a history of snoring.\par He has no history of coronary artery disease, diabetes mellitus, stroke, renal insufficiency or congestive heart failure.\par

## 2023-03-03 NOTE — PHYSICAL EXAM
[Well Developed] : well developed [Well Nourished] : well nourished [Normal Conjunctiva] : normal conjunctiva [Normal Venous Pressure] : normal venous pressure [No Murmur] : no murmur [Clear Lung Fields] : clear lung fields [Soft] : abdomen soft [Normal Gait] : normal gait [No Edema] : no edema [No Rash] : no rash [Moves all extremities] : moves all extremities [Alert and Oriented] : alert and oriented [Normal memory] : normal memory [de-identified] : Irregularly irregular heartbeat. [de-identified] : Anxious appearing

## 2023-03-09 ENCOUNTER — APPOINTMENT (OUTPATIENT)
Dept: CARDIOLOGY | Facility: CLINIC | Age: 58
End: 2023-03-09
Payer: COMMERCIAL

## 2023-03-09 PROCEDURE — 93306 TTE W/DOPPLER COMPLETE: CPT

## 2023-03-10 ENCOUNTER — NON-APPOINTMENT (OUTPATIENT)
Age: 58
End: 2023-03-10

## 2023-03-14 ENCOUNTER — APPOINTMENT (OUTPATIENT)
Dept: CARDIOLOGY | Facility: CLINIC | Age: 58
End: 2023-03-14

## 2023-03-14 ENCOUNTER — NON-APPOINTMENT (OUTPATIENT)
Age: 58
End: 2023-03-14

## 2023-03-15 DIAGNOSIS — F41.9 ANXIETY DISORDER, UNSPECIFIED: ICD-10-CM

## 2023-03-20 ENCOUNTER — TRANSCRIPTION ENCOUNTER (OUTPATIENT)
Age: 58
End: 2023-03-20

## 2023-03-20 ENCOUNTER — OUTPATIENT (OUTPATIENT)
Dept: OUTPATIENT SERVICES | Facility: HOSPITAL | Age: 58
LOS: 1 days | Discharge: HOME | End: 2023-03-20
Payer: COMMERCIAL

## 2023-03-20 VITALS
RESPIRATION RATE: 16 BRPM | SYSTOLIC BLOOD PRESSURE: 145 MMHG | HEIGHT: 67 IN | TEMPERATURE: 99 F | DIASTOLIC BLOOD PRESSURE: 95 MMHG | OXYGEN SATURATION: 95 % | HEART RATE: 109 BPM | WEIGHT: 214.95 LBS

## 2023-03-20 VITALS
DIASTOLIC BLOOD PRESSURE: 92 MMHG | HEART RATE: 84 BPM | RESPIRATION RATE: 18 BRPM | OXYGEN SATURATION: 95 % | SYSTOLIC BLOOD PRESSURE: 131 MMHG

## 2023-03-20 DIAGNOSIS — I42.0 DILATED CARDIOMYOPATHY: ICD-10-CM

## 2023-03-20 DIAGNOSIS — Z98.89 OTHER SPECIFIED POSTPROCEDURAL STATES: Chronic | ICD-10-CM

## 2023-03-20 DIAGNOSIS — Z98.890 OTHER SPECIFIED POSTPROCEDURAL STATES: Chronic | ICD-10-CM

## 2023-03-20 LAB
ANION GAP SERPL CALC-SCNC: 13 MMOL/L — SIGNIFICANT CHANGE UP (ref 5–17)
BUN SERPL-MCNC: 14 MG/DL — SIGNIFICANT CHANGE UP (ref 7–23)
CALCIUM SERPL-MCNC: 9.7 MG/DL — SIGNIFICANT CHANGE UP (ref 8.4–10.5)
CHLORIDE SERPL-SCNC: 104 MMOL/L — SIGNIFICANT CHANGE UP (ref 96–108)
CO2 SERPL-SCNC: 25 MMOL/L — SIGNIFICANT CHANGE UP (ref 22–31)
CREAT SERPL-MCNC: 0.95 MG/DL — SIGNIFICANT CHANGE UP (ref 0.5–1.3)
EGFR: 94 ML/MIN/1.73M2 — SIGNIFICANT CHANGE UP
GLUCOSE SERPL-MCNC: 124 MG/DL — HIGH (ref 70–99)
HCT VFR BLD CALC: 50.8 % — HIGH (ref 39–50)
HGB BLD-MCNC: 17.5 G/DL — HIGH (ref 13–17)
MCHC RBC-ENTMCNC: 30 PG — SIGNIFICANT CHANGE UP (ref 27–34)
MCHC RBC-ENTMCNC: 34.4 GM/DL — SIGNIFICANT CHANGE UP (ref 32–36)
MCV RBC AUTO: 87 FL — SIGNIFICANT CHANGE UP (ref 80–100)
NRBC # BLD: 0 /100 WBCS — SIGNIFICANT CHANGE UP (ref 0–0)
PLATELET # BLD AUTO: 231 K/UL — SIGNIFICANT CHANGE UP (ref 150–400)
POTASSIUM SERPL-MCNC: 4.2 MMOL/L — SIGNIFICANT CHANGE UP (ref 3.5–5.3)
POTASSIUM SERPL-SCNC: 4.2 MMOL/L — SIGNIFICANT CHANGE UP (ref 3.5–5.3)
RBC # BLD: 5.84 M/UL — HIGH (ref 4.2–5.8)
RBC # FLD: 12.9 % — SIGNIFICANT CHANGE UP (ref 10.3–14.5)
SODIUM SERPL-SCNC: 142 MMOL/L — SIGNIFICANT CHANGE UP (ref 135–145)
WBC # BLD: 6.97 K/UL — SIGNIFICANT CHANGE UP (ref 3.8–10.5)
WBC # FLD AUTO: 6.97 K/UL — SIGNIFICANT CHANGE UP (ref 3.8–10.5)

## 2023-03-20 PROCEDURE — 93458 L HRT ARTERY/VENTRICLE ANGIO: CPT | Mod: 26

## 2023-03-20 PROCEDURE — C1887: CPT

## 2023-03-20 PROCEDURE — 36415 COLL VENOUS BLD VENIPUNCTURE: CPT

## 2023-03-20 PROCEDURE — 85027 COMPLETE CBC AUTOMATED: CPT

## 2023-03-20 PROCEDURE — 93010 ELECTROCARDIOGRAM REPORT: CPT

## 2023-03-20 PROCEDURE — 93571 IV DOP VEL&/PRESS C FLO 1ST: CPT | Mod: LD

## 2023-03-20 PROCEDURE — C1894: CPT

## 2023-03-20 PROCEDURE — C1769: CPT

## 2023-03-20 PROCEDURE — 93571 IV DOP VEL&/PRESS C FLO 1ST: CPT | Mod: 26,LD

## 2023-03-20 PROCEDURE — 93005 ELECTROCARDIOGRAM TRACING: CPT

## 2023-03-20 PROCEDURE — 99152 MOD SED SAME PHYS/QHP 5/>YRS: CPT

## 2023-03-20 PROCEDURE — 93458 L HRT ARTERY/VENTRICLE ANGIO: CPT

## 2023-03-20 PROCEDURE — 80048 BASIC METABOLIC PNL TOTAL CA: CPT

## 2023-03-20 RX ORDER — SODIUM CHLORIDE 9 MG/ML
1000 INJECTION INTRAMUSCULAR; INTRAVENOUS; SUBCUTANEOUS
Refills: 0 | Status: DISCONTINUED | OUTPATIENT
Start: 2023-03-20 | End: 2023-04-03

## 2023-03-20 RX ADMIN — SODIUM CHLORIDE 75 MILLILITER(S): 9 INJECTION INTRAMUSCULAR; INTRAVENOUS; SUBCUTANEOUS at 07:46

## 2023-03-20 NOTE — ASU DISCHARGE PLAN (ADULT/PEDIATRIC) - NS MD DC FALL RISK RISK
For information on Fall & Injury Prevention, visit: [email or web address] OR  [email or web address] OR  https://www.cdc.gov/steadi/patient.html

## 2023-03-20 NOTE — ASU DISCHARGE PLAN (ADULT/PEDIATRIC) - CARE PROVIDER_API CALL
Lisker, Jay J (MD)  Cardiovascular Disease; Internal Medicine  [address]  [place] [address]  Phone: [phone]  Fax: [phone]  Follow Up Time:

## 2023-03-20 NOTE — H&P CARDIOLOGY - HISTORY OF PRESENT ILLNESS
58 yo M with PMHx of  HTN, HLD, GERD, snoring, over weight and paroxysmal atrial fibrillation during ortho procedure and converted spontaneously presents for cardiac cath. Pt reports he had sweating and jitteriness, noted to be in atrial fibrillation with a rapid ventricular response, evaluated by Dr. Lisker. Metoprolol started and rate has been controlled, Eliquis started (last dose 3/17 pm) now came in for [facility]. Pt reports having occasional non exertional chest discomfort and SOB.   He denis has a history of an intestinal mass that is being monitored with annual MRI which has reportedly not grown for the past 2 years.    Card: Dr. J Lisker

## 2023-03-20 NOTE — ASU PATIENT PROFILE, ADULT - DOES PATIENT HAVE ADVANCE DIRECTIVE

## 2023-03-20 NOTE — H&P CARDIOLOGY - NSICDXPASTMEDICALHX_GEN_ALL_CORE_FT
PAST MEDICAL HISTORY:  A-fib     Abdominal mass     Chronic GERD     HTN (hypertension)     Mixed hyperlipidemia

## 2023-03-20 NOTE — ASU DISCHARGE PLAN (ADULT/PEDIATRIC) - ASU DC SPECIAL INSTRUCTIONSFT
Wound Care:   the day AFTER your procedure remove bandage GENTLY, and clean using  mild soap and gentle warm, water stream, pat dry. leave OPEN to air. YOU MAY SHOWER   DO NOT apply lotions, creams, ointments, powder, perfumes to your incision site  DO NOT SOAK your site for 1 week ( no baths, no pools, no tubs, etc...)  Check  your groin and /or wrist daily.A small amount of bruising, and soarness are normal    ACTIVITY: for 24 hours   - DO NOT DRIVE  - DO NOT make any important decisions or sign legal documents   - DO NOT operate heavy machineries   - you may resume sexual activity in 48 hours, unless otherwise instructed by your cardiologist     If your procedure was done through the WRIST: for the NEXT 3DAYS:  - avoid pushing, pulling, with that affected wrist   - avoid repeated movement of that hand and wrist ( eg: typing, hammering)  - DO NOT LIFT anything more than 5 lbs     If your procedure was done through the GROIN: for the NEXT 5 DAYS  - Limit climbing stairs, DO NOT soak in bathtub or pool  - no strenuous activities, pushing, pulling, straining  - Do not lift anything 10lbs or heavier     MEDICATION:   take your medications as explained ( see discharge paperwork)   If you received a STENT, you will be taking antiplatelet medications to KEEP YOUR STENT OPEN ( eg: Aspirin, Plavix, Brilinta, Effient, etc).  Take as prescribed DO NOT STOP taking them without consulting with your cardiologist first.     Follow heart healthy diet recommended by your doctor, , if you smoke STOP SMOKING ( may call [phone] for center of tobacco control if you need assistance)     CALL your doctor to make appointment in 2 WEEKS     ***CALL YOUR DOCTOR***  if you experience: fever, chills, body aches, or severe pain, swelling, redness, heat or yellow discharge at incision site  If you experience Bleeding or excruciating pain at the procedural site, swelling ( golf ball size) at your procedural site  If you experience CHEST PAIN  If you experience extremity numbness, tingling, temperature change ( of your procedural site)   If you are unable to reach your doctor, you may contact:   -Cardiology Office at [facility] at [phone] or   - [facility] [phone]  - [facility] [phone]

## 2023-03-23 ENCOUNTER — APPOINTMENT (OUTPATIENT)
Dept: CARDIOLOGY | Facility: CLINIC | Age: 58
End: 2023-03-23
Payer: COMMERCIAL

## 2023-03-23 ENCOUNTER — NON-APPOINTMENT (OUTPATIENT)
Age: 58
End: 2023-03-23

## 2023-03-23 VITALS
HEART RATE: 89 BPM | BODY MASS INDEX: 34.21 KG/M2 | DIASTOLIC BLOOD PRESSURE: 78 MMHG | HEIGHT: 67 IN | WEIGHT: 218 LBS | OXYGEN SATURATION: 94 % | RESPIRATION RATE: 17 BRPM | SYSTOLIC BLOOD PRESSURE: 102 MMHG

## 2023-03-23 PROBLEM — I48.91 UNSPECIFIED ATRIAL FIBRILLATION: Chronic | Status: ACTIVE | Noted: 2023-03-20

## 2023-03-23 PROBLEM — I10 ESSENTIAL (PRIMARY) HYPERTENSION: Chronic | Status: ACTIVE | Noted: 2023-03-20

## 2023-03-23 PROBLEM — R19.00 INTRA-ABDOMINAL AND PELVIC SWELLING, MASS AND LUMP, UNSPECIFIED SITE: Chronic | Status: ACTIVE | Noted: 2023-03-20

## 2023-03-23 PROCEDURE — 99214 OFFICE O/P EST MOD 30 MIN: CPT | Mod: 25

## 2023-03-23 PROCEDURE — 93000 ELECTROCARDIOGRAM COMPLETE: CPT

## 2023-03-24 ENCOUNTER — NON-APPOINTMENT (OUTPATIENT)
Age: 58
End: 2023-03-24

## 2023-03-24 NOTE — CARDIOLOGY SUMMARY
[de-identified] : March 20, 2023: There is a 25% distal left main stenosis.  The proximal LAD had a 60% stenosis (iFR was performed and deemed to be not obstructed blood flow) second diagonal had a 30% lesion.\par Ostial circumflex had a 70% stenosis IFR was deemed to be nonobstructive a flow.\par Right coronary artery had a proximal 30% stenosis, a mid 40% stenosis and the first RPL had a 50% stenosis.

## 2023-03-24 NOTE — PHYSICAL EXAM
[Well Developed] : well developed [Well Nourished] : well nourished [Normal Conjunctiva] : normal conjunctiva [Normal Venous Pressure] : normal venous pressure [No Murmur] : no murmur [Clear Lung Fields] : clear lung fields [Soft] : abdomen soft [Normal Gait] : normal gait [No Edema] : no edema [No Rash] : no rash [Moves all extremities] : moves all extremities [Alert and Oriented] : alert and oriented [Normal memory] : normal memory [de-identified] : Irregularly irregular heartbeat. [de-identified] : Anxious appearing

## 2023-03-24 NOTE — DISCUSSION/SUMMARY
[FreeTextEntry1] : He is a 57-year-old with hypertension, GERD and episodes of atrial flutter/fibrillation.\par Now with diffuse coronary atherosclerosis without any definitive obstructive lesion.\par CAD: aggressive medical therapy is in order given his diffuse atherosclerosis and young age.  He has no high risk bleeding features therefore aggressive medical therapy will include both a NOAC and antiplatelet agent.  I have chosen Plavix 75 mg once daily to reduce the chance of GI upset.\par High-dose statin is appropriate.\par We also discussed the benefits of colchicine at reducing cardiovascular inflammation and cardiovascular events.  I think this is appropriate medical regimen for him.\par We will begin colchicine 0.6 mg daily.  If he is unable to tolerate this we may discontinue it.\par A-fib: Still in atrial fibrillation.  Continue metoprolol for heart rate control.  Continue oral anticoagulation for stroke risk reduction.\par He has a appointment with EP next week for consideration for ablation.\par Plan was reviewed with Dr. Terrell. [EKG obtained to assist in diagnosis and management of assessed problem(s)] : EKG obtained to assist in diagnosis and management of assessed problem(s)

## 2023-03-24 NOTE — HISTORY OF PRESENT ILLNESS
[FreeTextEntry1] : Coronary angio: Moderate diffuse disease. See report.\par NO angina.\par Wants to know\par \par Prior:\par Dear Sharon,\par Thank you for referring him for cardiovascular evaluation.  He is a 57-year-old with a history of paroxysmal atrial fibrillation and hypertension who presented to your office with sweating, jitteriness and was noted to be in atrial fibrillation with a rapid ventricular response.\par He reports that approximately 2 weeks ago his work time schedule was changed drastically and he was under severe stress.\par He started reporting feeling shaky and jittery as well as some shortness of breath.\par He had no chest pains, orthopnea, PND or leg edema.\par When he was seen in your office he was noted to be in atrial fibrillation with a rapid ventricular response.\par His previous atrial fibrillation episode occurred in the setting of the operating room and resolved with beta-blockade.\par He has a history of hypertension, GERD and an intestinal mass that is being monitored with annual MRI which has reportedly not grown for the past 2 years.\par He has a history of snoring.\par He has no history of coronary artery disease, diabetes mellitus, stroke, renal insufficiency or congestive heart failure.\par

## 2023-03-28 ENCOUNTER — NON-APPOINTMENT (OUTPATIENT)
Age: 58
End: 2023-03-28

## 2023-03-28 ENCOUNTER — APPOINTMENT (OUTPATIENT)
Dept: ELECTROPHYSIOLOGY | Facility: CLINIC | Age: 58
End: 2023-03-28
Payer: COMMERCIAL

## 2023-03-28 VITALS — OXYGEN SATURATION: 97 % | HEART RATE: 75 BPM

## 2023-03-28 PROCEDURE — 99204 OFFICE O/P NEW MOD 45 MIN: CPT | Mod: 25

## 2023-03-28 PROCEDURE — 93000 ELECTROCARDIOGRAM COMPLETE: CPT

## 2023-03-28 RX ORDER — VALACYCLOVIR 1 G/1
1 TABLET, FILM COATED ORAL
Qty: 12 | Refills: 2 | Status: DISCONTINUED | COMMUNITY
Start: 2018-08-07 | End: 2023-03-28

## 2023-03-29 NOTE — CARDIOLOGY SUMMARY
[de-identified] : today: Atrial fibrillation  [de-identified] : 3/20/2023\par Moderate to severe LAD and LCX disease with normal FFR

## 2023-03-29 NOTE — PHYSICAL EXAM

## 2023-03-29 NOTE — HISTORY OF PRESENT ILLNESS
[FreeTextEntry1] : 57-year-old with a history of paroxysmal atrial fibrillation and hypertension who recently presented to the office of his PMD with sweating, jitteriness and was noted to be in atrial fibrillation with a rapid ventricular response.\par For approximately 2 weeks prior his work time schedule was changed drastically and he was under severe stress. He started reporting feeling shaky and jittery as well as some shortness of breath. He had no chest pains, orthopnea, PND or leg edema. His previous atrial fibrillation episode occurred in the setting of the operating room and resolved with beta-blockade.\par \par He has a history of hypertension, GERD and an intestinal mass that is being monitored with annual MRI which has reportedly not grown for the past 2 years. He has a history of snoring. He has no history of coronary artery disease, diabetes mellitus, stroke, renal insufficiency or congestive heart failure.\par \par He is now on metoprolol and Eliquis.  He complains of fatigue and some mild wooziness.  No chest pain.  No shortness of breath. No palpitations.

## 2023-03-29 NOTE — DISCUSSION/SUMMARY
[FreeTextEntry1] : 57 year old man with persistent atrial fibrillation in the setting of hypertension and coronary artery disease. He is currently under significant stress with changes going on at work and we did discuss that stress may play a role, especially in someone who may have a tendency towards AF.  He did have a prior paroxysm 10 years ago in the setting of noncardiac surgery.  We discussed options for therapy.  We have decided to proceed with an attempt at CHRISTIANO guided DCCV. We will reserve more aggressive therapy (ie ablation) pending course.

## 2023-04-10 ENCOUNTER — NON-APPOINTMENT (OUTPATIENT)
Age: 58
End: 2023-04-10

## 2023-04-13 ENCOUNTER — TRANSCRIPTION ENCOUNTER (OUTPATIENT)
Age: 58
End: 2023-04-13

## 2023-04-13 ENCOUNTER — APPOINTMENT (OUTPATIENT)
Dept: CV DIAGNOSITCS | Facility: HOSPITAL | Age: 58
End: 2023-04-13

## 2023-04-13 ENCOUNTER — OUTPATIENT (OUTPATIENT)
Dept: OUTPATIENT SERVICES | Facility: HOSPITAL | Age: 58
LOS: 1 days | End: 2023-04-13
Payer: COMMERCIAL

## 2023-04-13 VITALS
WEIGHT: 199.96 LBS | HEIGHT: 68 IN | SYSTOLIC BLOOD PRESSURE: 121 MMHG | RESPIRATION RATE: 20 BRPM | DIASTOLIC BLOOD PRESSURE: 90 MMHG | OXYGEN SATURATION: 94 % | HEART RATE: 94 BPM | TEMPERATURE: 98 F

## 2023-04-13 VITALS
SYSTOLIC BLOOD PRESSURE: 118 MMHG | OXYGEN SATURATION: 94 % | DIASTOLIC BLOOD PRESSURE: 65 MMHG | HEART RATE: 74 BPM | RESPIRATION RATE: 14 BRPM

## 2023-04-13 DIAGNOSIS — Z98.89 OTHER SPECIFIED POSTPROCEDURAL STATES: Chronic | ICD-10-CM

## 2023-04-13 DIAGNOSIS — Z98.890 OTHER SPECIFIED POSTPROCEDURAL STATES: Chronic | ICD-10-CM

## 2023-04-13 DIAGNOSIS — I48.91 UNSPECIFIED ATRIAL FIBRILLATION: ICD-10-CM

## 2023-04-13 LAB
ANION GAP SERPL CALC-SCNC: 13 MMOL/L — SIGNIFICANT CHANGE UP (ref 5–17)
BUN SERPL-MCNC: 16 MG/DL — SIGNIFICANT CHANGE UP (ref 7–23)
CALCIUM SERPL-MCNC: 9.2 MG/DL — SIGNIFICANT CHANGE UP (ref 8.4–10.5)
CHLORIDE SERPL-SCNC: 106 MMOL/L — SIGNIFICANT CHANGE UP (ref 96–108)
CO2 SERPL-SCNC: 25 MMOL/L — SIGNIFICANT CHANGE UP (ref 22–31)
CREAT SERPL-MCNC: 1.06 MG/DL — SIGNIFICANT CHANGE UP (ref 0.5–1.3)
EGFR: 82 ML/MIN/1.73M2 — SIGNIFICANT CHANGE UP
GLUCOSE SERPL-MCNC: 117 MG/DL — HIGH (ref 70–99)
HCT VFR BLD CALC: 46.4 % — SIGNIFICANT CHANGE UP (ref 39–50)
HGB BLD-MCNC: 16.5 G/DL — SIGNIFICANT CHANGE UP (ref 13–17)
MCHC RBC-ENTMCNC: 30.5 PG — SIGNIFICANT CHANGE UP (ref 27–34)
MCHC RBC-ENTMCNC: 35.6 GM/DL — SIGNIFICANT CHANGE UP (ref 32–36)
MCV RBC AUTO: 85.8 FL — SIGNIFICANT CHANGE UP (ref 80–100)
NRBC # BLD: 0 /100 WBCS — SIGNIFICANT CHANGE UP (ref 0–0)
PLATELET # BLD AUTO: 204 K/UL — SIGNIFICANT CHANGE UP (ref 150–400)
POTASSIUM SERPL-MCNC: 3.9 MMOL/L — SIGNIFICANT CHANGE UP (ref 3.5–5.3)
POTASSIUM SERPL-SCNC: 3.9 MMOL/L — SIGNIFICANT CHANGE UP (ref 3.5–5.3)
RBC # BLD: 5.41 M/UL — SIGNIFICANT CHANGE UP (ref 4.2–5.8)
RBC # FLD: 13 % — SIGNIFICANT CHANGE UP (ref 10.3–14.5)
SODIUM SERPL-SCNC: 144 MMOL/L — SIGNIFICANT CHANGE UP (ref 135–145)
WBC # BLD: 5.5 K/UL — SIGNIFICANT CHANGE UP (ref 3.8–10.5)
WBC # FLD AUTO: 5.5 K/UL — SIGNIFICANT CHANGE UP (ref 3.8–10.5)

## 2023-04-13 PROCEDURE — 93010 ELECTROCARDIOGRAM REPORT: CPT

## 2023-04-13 PROCEDURE — 93312 ECHO TRANSESOPHAGEAL: CPT

## 2023-04-13 PROCEDURE — 85027 COMPLETE CBC AUTOMATED: CPT

## 2023-04-13 PROCEDURE — 93325 DOPPLER ECHO COLOR FLOW MAPG: CPT | Mod: 26

## 2023-04-13 PROCEDURE — 92960 CARDIOVERSION ELECTRIC EXT: CPT

## 2023-04-13 PROCEDURE — 93320 DOPPLER ECHO COMPLETE: CPT | Mod: 26

## 2023-04-13 PROCEDURE — 93005 ELECTROCARDIOGRAM TRACING: CPT

## 2023-04-13 PROCEDURE — 76377 3D RENDER W/INTRP POSTPROCES: CPT

## 2023-04-13 PROCEDURE — 80048 BASIC METABOLIC PNL TOTAL CA: CPT

## 2023-04-13 PROCEDURE — 76377 3D RENDER W/INTRP POSTPROCES: CPT | Mod: 26

## 2023-04-13 PROCEDURE — 93320 DOPPLER ECHO COMPLETE: CPT

## 2023-04-13 PROCEDURE — 93312 ECHO TRANSESOPHAGEAL: CPT | Mod: 26

## 2023-04-13 PROCEDURE — 93325 DOPPLER ECHO COLOR FLOW MAPG: CPT

## 2023-04-13 RX ORDER — METOPROLOL TARTRATE 50 MG
25 TABLET ORAL DAILY
Refills: 0 | Status: DISCONTINUED | OUTPATIENT
Start: 2023-04-13 | End: 2023-04-27

## 2023-04-13 RX ORDER — APIXABAN 2.5 MG/1
5 TABLET, FILM COATED ORAL ONCE
Refills: 0 | Status: COMPLETED | OUTPATIENT
Start: 2023-04-13 | End: 2023-04-13

## 2023-04-13 RX ORDER — METOPROLOL TARTRATE 50 MG
5 TABLET ORAL ONCE
Refills: 0 | Status: COMPLETED | OUTPATIENT
Start: 2023-04-13 | End: 2023-04-13

## 2023-04-13 RX ADMIN — Medication 5 MILLIGRAM(S): at 13:14

## 2023-04-13 RX ADMIN — APIXABAN 5 MILLIGRAM(S): 2.5 TABLET, FILM COATED ORAL at 08:47

## 2023-04-13 NOTE — ASU DISCHARGE PLAN (ADULT/PEDIATRIC) - ASU DC SPECIAL INSTRUCTIONSFT
You underwent direct current cardioversion and transesophageal echo today.  You are now in sinus rhythm.  Continue Eliquis and metoprolol and all other home medications.  Please follow up with Dr Meza or your cardiologist in 2 weeks to repeat EKG.     WOUND CARE:  The day AFTER your procedure  - Remove the bandage at the site GENTLY, clean with mild soap and water, and pat dry; leave open to air  - You may shower   - DO NOT apply lotions, creams, ointments, powder, perfumes to your incision site  -Check your groin every day. A small amount of bruising or soreness is normal, a bump ( smaller than nickel) might be present, normal  - DO NOT SOAK your site for 1 week ( no baths, no pools, no tubs, etc..)    ACTIVITY:  Your procedure was done through your groin  for the next 5 DAYS:  - Limit climbing stairs, no strenuous activity, pushing , pulling, or straining   DO NOT LIFT anything 10 lbs or heavier   you may resume sexual activity in 7 days, unless instructed otherwise  mild palpitations are normal     Follow heart healthy diet recommended by your doctor, if you smoke STOP SMOKING (may call [phone] for center of tobacco control if you need assistance).  for the next 24 hours:   - stay at home and rest, do not drive or operate heavy machinery   do not drink alcoholic beverages   do not make important personal or business decisions     IF unable to get in contact with your doctor, you may call the Cardiology Office at [facility] at [phone]

## 2023-04-13 NOTE — H&P CARDIOLOGY - HISTORY OF PRESENT ILLNESS
This is a 58y/o  M with no known implantable devices noted COVID 19 negative Vaccinated with Pfizer x 2 Booster x 1 with PMHx of HTN, HLD, GERD, snoring, over weight and paroxysmal atrial fibrillation during ortho procedure and converted spontaneously presents for cardiac cath. Pt reports he had sweating and jitteriness, noted to be in atrial fibrillation with a rapid ventricular response, evaluated by Dr. Lisker. Metoprolol started and rate has been controlled, Eliquis started (last dose 4/12/23 ) will give Am dose now  Pt reports having occasional palpitations "Flutter in chest " . He also has a history of an intestinal mass that is being monitored with annual MRI which has reportedly not grown for the past 2 years.    < from: Cardiac Catheterization (03.20.23 @ 08:57) >  Diagnostic Findings:     Coronary Angiography   The coronary circulation is right dominant.      LM   Distal left main: There is a 25 % stenosis.      LAD   Proximal left anterior descending: There is a 60 % stenosis. Instant  wave-free ratio was performed with a calculated value of  0.93. Based on the results of this study, the lesion was judged to be  non-significant and no intervention was performed. First  diagonal: Angiography shows minor irregularities. Second diagonal:  There is a 30 % stenosis.    CX   Ostial circumflex: There is a 70 % stenosis. Instant wave-free ratio  was performed with a calculated value of 0.98. Based on the  results of this study, the lesion was judged to be non-significant and  no intervention was performed.    Patient: JOSE ZHENG          MRN: [id number]  Study Date: 03/20/2023   08:57 AM      Page 1 of 3          RCA   Proximal right coronary artery: There is a 30 % stenosis. Mid right  coronary artery: There is a 40 % stenosis. First right  posterolateral: There is a 50 % stenosis.      < end of copied text >  < from: Transthoracic Echocardiogram (01.08.16 @ 12:44) >  Conclusions:  1. Normal mitral valve. Minimal mitral regurgitation.  2. Normal trileaflet aortic valve.  3. Mild aortic root dilatation. Consider CT of chest for  further evaluation  4. Normal left atrium.  5. Normal left ventricular internal dimensions and wall  thicknesses.  6. Normal Left Ventricular Systolic Function,  (EF = 55%)  7. Grade I diastolic dysfunction  8. Normal right atrium.  9. Normal right ventricular size and function.  10. Unable to estimate RVSP.  11. Normal tricuspid valve.  12. There is mild pulmonic regurgitation.  13. Normal pericardium with no pericardial effusion.      ------------------------------------------------------------------------    Confirmed on  1/9/2016 - 09:08:43 by Marva Valdez MD  ------------------------------------------------------------------------    < end of copied text >    Card: Dr. J Lisker             This is a 56y/o  M with no known implantable devices noted COVID 19 negative Vaccinated with Pfizer x 2 Booster x 1 with PMHx of Persistent AFIB on Eliquis last dose this am on 4/13/23  in setting of  HTN and CAD. HLD, GERD, Snoring, over weight and paroxysmal atrial fibrillation during ortho procedure and converted spontaneously presents for cardiac cath. Pt reports he had sweating and jitteriness, noted to be in atrial fibrillation with a rapid ventricular response, evaluated by Dr. Lisker. Metoprolol started and rate has been controlled,  Pt reports having occasional palpitations "Flutter in chest ". comes and goes . He also has a history of an intestinal mass that is being monitored with annual MRI which has reportedly not grown for the past 2 years.  Present today CHRISTIANO guided DCCV  with Dr. Meza.     < from: Cardiac Catheterization (03.20.23 @ 08:57) >  Diagnostic Findings:     Coronary Angiography   The coronary circulation is right dominant.      LM   Distal left main: There is a 25 % stenosis.      LAD   Proximal left anterior descending: There is a 60 % stenosis. Instant  wave-free ratio was performed with a calculated value of  0.93. Based on the results of this study, the lesion was judged to be  non-significant and no intervention was performed. First  diagonal: Angiography shows minor irregularities. Second diagonal:  There is a 30 % stenosis.    CX   Ostial circumflex: There is a 70 % stenosis. Instant wave-free ratio  was performed with a calculated value of 0.98. Based on the  results of this study, the lesion was judged to be non-significant and  no intervention was performed.    Patient: JOSE ZHENG          MRN: [id number]  Study Date: 03/20/2023   08:57 AM      Page 1 of 3          RCA   Proximal right coronary artery: There is a 30 % stenosis. Mid right  coronary artery: There is a 40 % stenosis. First right  posterolateral: There is a 50 % stenosis.      < end of copied text >  < from: Transthoracic Echocardiogram (01.08.16 @ 12:44) >  Conclusions:  1. Normal mitral valve. Minimal mitral regurgitation.  2. Normal trileaflet aortic valve.  3. Mild aortic root dilatation. Consider CT of chest for  further evaluation  4. Normal left atrium.  5. Normal left ventricular internal dimensions and wall  thicknesses.  6. Normal Left Ventricular Systolic Function,  (EF = 55%)  7. Grade I diastolic dysfunction  8. Normal right atrium.  9. Normal right ventricular size and function.  10. Unable to estimate RVSP.  11. Normal tricuspid valve.  12. There is mild pulmonic regurgitation.  13. Normal pericardium with no pericardial effusion.      ------------------------------------------------------------------------    Confirmed on  1/9/2016 - 09:08:43 by Marva Valdez MD  ------------------------------------------------------------------------    < end of copied text >    Card: Dr. J Lisker

## 2023-04-13 NOTE — ASU DISCHARGE PLAN (ADULT/PEDIATRIC) - CARE PROVIDER_API CALL
Moy Meza)  Cardiac Electrophysiology; Cardiology  [address] [place] [address]  Phone: [phone]  Fax: [phone]  Follow Up Time: 2 weeks

## 2023-04-13 NOTE — ASU PATIENT PROFILE, ADULT - NSICDXPASTSURGICALHX_GEN_ALL_CORE_FT
PAST SURGICAL HISTORY:  History of knee surgery     History of shoulder surgery     S/P arthroscopy left shoulder- 2014, left knee-1999

## 2023-04-13 NOTE — ASU PATIENT PROFILE, ADULT - FALL HARM RISK - UNIVERSAL INTERVENTIONS
Bed in lowest position, wheels locked, appropriate side rails in place/Call bell, personal items and telephone in reach/Instruct patient to call for assistance before getting out of bed or chair/Non-slip footwear when patient is out of bed/[place] to call system/Physically safe environment - no spills, clutter or unnecessary equipment/Purposeful Proactive Rounding/Room/bathroom lighting operational, light cord in reach

## 2023-04-20 ENCOUNTER — APPOINTMENT (OUTPATIENT)
Dept: CARDIOLOGY | Facility: CLINIC | Age: 58
End: 2023-04-20
Payer: COMMERCIAL

## 2023-04-20 ENCOUNTER — NON-APPOINTMENT (OUTPATIENT)
Age: 58
End: 2023-04-20

## 2023-04-20 VITALS
HEART RATE: 77 BPM | OXYGEN SATURATION: 96 % | DIASTOLIC BLOOD PRESSURE: 80 MMHG | BODY MASS INDEX: 34.14 KG/M2 | SYSTOLIC BLOOD PRESSURE: 120 MMHG | WEIGHT: 218 LBS

## 2023-04-20 PROCEDURE — 93000 ELECTROCARDIOGRAM COMPLETE: CPT

## 2023-04-20 PROCEDURE — 99214 OFFICE O/P EST MOD 30 MIN: CPT | Mod: 25

## 2023-04-21 ENCOUNTER — TRANSCRIPTION ENCOUNTER (OUTPATIENT)
Age: 58
End: 2023-04-21

## 2023-04-21 LAB
ALBUMIN SERPL ELPH-MCNC: 4.8 G/DL
ALP BLD-CCNC: 124 U/L
ALT SERPL-CCNC: 64 U/L
ANION GAP SERPL CALC-SCNC: 13 MMOL/L
AST SERPL-CCNC: 40 U/L
BASOPHILS # BLD AUTO: 0.04 K/UL
BASOPHILS NFR BLD AUTO: 0.7 %
BILIRUB SERPL-MCNC: 0.9 MG/DL
BUN SERPL-MCNC: 12 MG/DL
CALCIUM SERPL-MCNC: 10.1 MG/DL
CHLORIDE SERPL-SCNC: 104 MMOL/L
CHOLEST SERPL-MCNC: 121 MG/DL
CO2 SERPL-SCNC: 25 MMOL/L
CREAT SERPL-MCNC: 0.94 MG/DL
EGFR: 95 ML/MIN/1.73M2
EOSINOPHIL # BLD AUTO: 0.28 K/UL
EOSINOPHIL NFR BLD AUTO: 4.8 %
GLUCOSE SERPL-MCNC: 105 MG/DL
HCT VFR BLD CALC: 47.8 %
HDLC SERPL-MCNC: 35 MG/DL
HGB BLD-MCNC: 16.2 G/DL
IMM GRANULOCYTES NFR BLD AUTO: 0.2 %
LDLC SERPL CALC-MCNC: 70 MG/DL
LYMPHOCYTES # BLD AUTO: 1.49 K/UL
LYMPHOCYTES NFR BLD AUTO: 25.5 %
MAN DIFF?: NORMAL
MCHC RBC-ENTMCNC: 29.9 PG
MCHC RBC-ENTMCNC: 33.9 GM/DL
MCV RBC AUTO: 88.2 FL
MONOCYTES # BLD AUTO: 0.6 K/UL
MONOCYTES NFR BLD AUTO: 10.3 %
NEUTROPHILS # BLD AUTO: 3.43 K/UL
NEUTROPHILS NFR BLD AUTO: 58.5 %
NONHDLC SERPL-MCNC: 86 MG/DL
PLATELET # BLD AUTO: 219 K/UL
POTASSIUM SERPL-SCNC: 4.6 MMOL/L
PROT SERPL-MCNC: 7.4 G/DL
RBC # BLD: 5.42 M/UL
RBC # FLD: 13.1 %
SODIUM SERPL-SCNC: 143 MMOL/L
TRIGL SERPL-MCNC: 79 MG/DL
WBC # FLD AUTO: 5.85 K/UL

## 2023-04-21 NOTE — HISTORY OF PRESENT ILLNESS
[FreeTextEntry1] : s/p CHRISTIANO cardioversion.\par Doing well. No bleeding or bruising on his current medical regimen.\par He denies any exertional chest pains or shortness of breath though he is not performing routine aerobic activity.  He is fairly active as a  at work.\par \par \par Prior:\par Coronary angio: Moderate diffuse disease. See report.\par NO angina.\par Wants to know\par \par Prior:\par Dear Sharon,\par Thank you for referring him for cardiovascular evaluation.  He is a 57-year-old with a history of paroxysmal atrial fibrillation and hypertension who presented to your office with sweating, jitteriness and was noted to be in atrial fibrillation with a rapid ventricular response.\par He reports that approximately 2 weeks ago his work time schedule was changed drastically and he was under severe stress.\par He started reporting feeling shaky and jittery as well as some shortness of breath.\par He had no chest pains, orthopnea, PND or leg edema.\par When he was seen in your office he was noted to be in atrial fibrillation with a rapid ventricular response.\par His previous atrial fibrillation episode occurred in the setting of the operating room and resolved with beta-blockade.\par He has a history of hypertension, GERD and an intestinal mass that is being monitored with annual MRI which has reportedly not grown for the past 2 years.\par He has a history of snoring.\par He has no history of coronary artery disease, diabetes mellitus, stroke, renal insufficiency or congestive heart failure.\par

## 2023-04-21 NOTE — DISCUSSION/SUMMARY
[FreeTextEntry1] : He is a 57-year-old with hypertension, GERD and episodes of atrial flutter/fibrillation.\par Now with diffuse coronary atherosclerosis without any definitive obstructive lesion.\par CAD: No anginal symptoms.  He is tolerating aggressive medical therapy with Plavix and colchicine.  No aspirin due to concomitant NOAC use.\par Continue high-dose statin with a goal LDL less than 70 mg/dL.\par \par A-fib: Now in sinus rhythm status post cardioversion.\par Continue metoprolol and oral anticoagulation for stroke risk reduction.\par  [EKG obtained to assist in diagnosis and management of assessed problem(s)] : EKG obtained to assist in diagnosis and management of assessed problem(s)

## 2023-04-21 NOTE — PHYSICAL EXAM
[Well Developed] : well developed [Well Nourished] : well nourished [Normal Conjunctiva] : normal conjunctiva [Normal Venous Pressure] : normal venous pressure [No Murmur] : no murmur [Clear Lung Fields] : clear lung fields [Soft] : abdomen soft [Normal Gait] : normal gait [No Edema] : no edema [Moves all extremities] : moves all extremities [No Rash] : no rash [Alert and Oriented] : alert and oriented [Normal memory] : normal memory [de-identified] : Irregularly irregular heartbeat. [de-identified] : Anxious appearing

## 2023-04-21 NOTE — CARDIOLOGY SUMMARY
[de-identified] : March 20, 2023: There is a 25% distal left main stenosis.  The proximal LAD had a 60% stenosis (iFR was performed and deemed to be not obstructed blood flow) second diagonal had a 30% lesion.\par Ostial circumflex had a 70% stenosis IFR was deemed to be nonobstructive a flow.\par Right coronary artery had a proximal 30% stenosis, a mid 40% stenosis and the first RPL had a 50% stenosis.

## 2023-05-02 ENCOUNTER — NON-APPOINTMENT (OUTPATIENT)
Age: 58
End: 2023-05-02

## 2023-05-02 ENCOUNTER — APPOINTMENT (OUTPATIENT)
Dept: ELECTROPHYSIOLOGY | Facility: CLINIC | Age: 58
End: 2023-05-02
Payer: COMMERCIAL

## 2023-05-02 VITALS — HEART RATE: 74 BPM | OXYGEN SATURATION: 95 % | SYSTOLIC BLOOD PRESSURE: 104 MMHG | DIASTOLIC BLOOD PRESSURE: 72 MMHG

## 2023-05-02 PROCEDURE — 99214 OFFICE O/P EST MOD 30 MIN: CPT | Mod: 25

## 2023-05-02 PROCEDURE — 93000 ELECTROCARDIOGRAM COMPLETE: CPT

## 2023-05-02 NOTE — CARDIOLOGY SUMMARY
[de-identified] : today:\par 3/28/2023: Atrial fibrillation  [de-identified] : 4/13/2023\par 1. Moderately enlarged right ventricular cavity size and normal systolic function.\par 2. No thrombus seen in the left atrial, left atrial appendage, right atrial or right atrial appendage.\par 3. No prior echocardiogram is available for comparison.\par 4. The left atrial appendage emptying velocity is normal at 71 cm/s. [de-identified] : 3/20/2023\par Moderate to severe LAD and LCX disease with normal FFR

## 2023-05-02 NOTE — HISTORY OF PRESENT ILLNESS
[FreeTextEntry1] : 57-year-old with a history of paroxysmal atrial fibrillation and hypertension who recently presented to the office of his PMD with sweating, jitteriness and was noted to be in atrial fibrillation with a rapid ventricular response.\par For approximately 2 weeks prior his work time schedule was changed drastically and he was under severe stress. He started reporting feeling shaky and jittery as well as some shortness of breath. He had no chest pains, orthopnea, PND or leg edema. His previous atrial fibrillation episode occurred in the setting of the operating room and resolved with beta-blockade.\par \par He has a history of hypertension, GERD and an intestinal mass that is being monitored with annual MRI which has reportedly not grown for the past 2 years. He has a history of snoring. He has no history of coronary artery disease, diabetes mellitus, stroke, renal insufficiency or congestive heart failure.\par \par He underwent CHRISTIANO guided DCCV 4/13/2023.  Post procedure on tele he demonstrated frequent APCs.  He feels a little short of breath. No chest pain. No lightheadedness/dizziness.

## 2023-05-02 NOTE — DISCUSSION/SUMMARY
[FreeTextEntry1] : 57 year old man with persistent atrial fibrillation in the setting of hypertension and coronary artery disease.  He underwent CHRISTIANO guided DCCV 4/13/2023.  Post procedure on tele he demonstrated frequent APCs.  Today is found back in atrial fibrillation.  Ventricular rates are rapid.  I suggested that we increase his metoprolol.  he explains that he feels unwell on increased AV glenn blockade.  We discussed antiarrhythmic medications as well as the role of ablation.  All of his questions were answered and the procedure will be arranged.

## 2023-05-24 DIAGNOSIS — Z01.818 ENCOUNTER FOR OTHER PREPROCEDURAL EXAMINATION: ICD-10-CM

## 2023-05-24 LAB
ALBUMIN SERPL ELPH-MCNC: 4.8 G/DL
ALP BLD-CCNC: 127 U/L
ALT SERPL-CCNC: 55 U/L
ANION GAP SERPL CALC-SCNC: 13 MMOL/L
AST SERPL-CCNC: 34 U/L
BILIRUB SERPL-MCNC: 1 MG/DL
BUN SERPL-MCNC: 20 MG/DL
CALCIUM SERPL-MCNC: 10.3 MG/DL
CHLORIDE SERPL-SCNC: 107 MMOL/L
CO2 SERPL-SCNC: 26 MMOL/L
CREAT SERPL-MCNC: 1 MG/DL
EGFR: 88 ML/MIN/1.73M2
GLUCOSE SERPL-MCNC: 102 MG/DL
INR PPP: 1.36 RATIO
POTASSIUM SERPL-SCNC: 3.7 MMOL/L
PROT SERPL-MCNC: 7.2 G/DL
PT BLD: 15.8 SEC
SODIUM SERPL-SCNC: 145 MMOL/L

## 2023-05-26 ENCOUNTER — OUTPATIENT (OUTPATIENT)
Dept: INPATIENT UNIT | Facility: HOSPITAL | Age: 58
LOS: 1 days | End: 2023-05-26
Payer: COMMERCIAL

## 2023-05-26 ENCOUNTER — TRANSCRIPTION ENCOUNTER (OUTPATIENT)
Age: 58
End: 2023-05-26

## 2023-05-26 VITALS
RESPIRATION RATE: 16 BRPM | HEART RATE: 87 BPM | HEIGHT: 68 IN | SYSTOLIC BLOOD PRESSURE: 129 MMHG | TEMPERATURE: 98 F | DIASTOLIC BLOOD PRESSURE: 93 MMHG | WEIGHT: 212.08 LBS | OXYGEN SATURATION: 95 %

## 2023-05-26 VITALS
RESPIRATION RATE: 17 BRPM | SYSTOLIC BLOOD PRESSURE: 114 MMHG | DIASTOLIC BLOOD PRESSURE: 73 MMHG | OXYGEN SATURATION: 94 % | TEMPERATURE: 98 F | HEART RATE: 73 BPM

## 2023-05-26 DIAGNOSIS — I48.91 UNSPECIFIED ATRIAL FIBRILLATION: ICD-10-CM

## 2023-05-26 DIAGNOSIS — Z98.890 OTHER SPECIFIED POSTPROCEDURAL STATES: Chronic | ICD-10-CM

## 2023-05-26 DIAGNOSIS — Z98.89 OTHER SPECIFIED POSTPROCEDURAL STATES: Chronic | ICD-10-CM

## 2023-05-26 PROCEDURE — 93655 ICAR CATH ABLTJ DSCRT ARRHYT: CPT

## 2023-05-26 PROCEDURE — 93657 TX L/R ATRIAL FIB ADDL: CPT

## 2023-05-26 PROCEDURE — 93623 PRGRMD STIMJ&PACG IV RX NFS: CPT

## 2023-05-26 PROCEDURE — 86850 RBC ANTIBODY SCREEN: CPT

## 2023-05-26 PROCEDURE — C1731: CPT

## 2023-05-26 PROCEDURE — C1766: CPT

## 2023-05-26 PROCEDURE — C1889: CPT

## 2023-05-26 PROCEDURE — C1759: CPT

## 2023-05-26 PROCEDURE — 86901 BLOOD TYPING SEROLOGIC RH(D): CPT

## 2023-05-26 PROCEDURE — 86900 BLOOD TYPING SEROLOGIC ABO: CPT

## 2023-05-26 PROCEDURE — C1732: CPT

## 2023-05-26 PROCEDURE — 93005 ELECTROCARDIOGRAM TRACING: CPT

## 2023-05-26 PROCEDURE — 93656 COMPRE EP EVAL ABLTJ ATR FIB: CPT

## 2023-05-26 PROCEDURE — C1894: CPT

## 2023-05-26 RX ORDER — METOPROLOL TARTRATE 50 MG
50 TABLET ORAL DAILY
Refills: 0 | Status: DISCONTINUED | OUTPATIENT
Start: 2023-05-27 | End: 2023-05-26

## 2023-05-26 RX ORDER — APIXABAN 2.5 MG/1
5 TABLET, FILM COATED ORAL ONCE
Refills: 0 | Status: COMPLETED | OUTPATIENT
Start: 2023-05-26 | End: 2023-05-26

## 2023-05-26 RX ORDER — ONDANSETRON 8 MG/1
4 TABLET, FILM COATED ORAL ONCE
Refills: 0 | Status: DISCONTINUED | OUTPATIENT
Start: 2023-05-26 | End: 2023-05-26

## 2023-05-26 RX ORDER — CLOPIDOGREL BISULFATE 75 MG/1
75 TABLET, FILM COATED ORAL DAILY
Refills: 0 | Status: DISCONTINUED | OUTPATIENT
Start: 2023-05-27 | End: 2023-05-26

## 2023-05-26 RX ORDER — FUROSEMIDE 40 MG
20 TABLET ORAL DAILY
Refills: 0 | Status: DISCONTINUED | OUTPATIENT
Start: 2023-05-27 | End: 2023-05-26

## 2023-05-26 RX ORDER — ATORVASTATIN CALCIUM 80 MG/1
80 TABLET, FILM COATED ORAL AT BEDTIME
Refills: 0 | Status: DISCONTINUED | OUTPATIENT
Start: 2023-05-26 | End: 2023-05-26

## 2023-05-26 RX ORDER — AMLODIPINE BESYLATE 2.5 MG/1
10 TABLET ORAL DAILY
Refills: 0 | Status: DISCONTINUED | OUTPATIENT
Start: 2023-05-27 | End: 2023-05-26

## 2023-05-26 RX ORDER — KETOROLAC TROMETHAMINE 30 MG/ML
30 SYRINGE (ML) INJECTION ONCE
Refills: 0 | Status: DISCONTINUED | OUTPATIENT
Start: 2023-05-26 | End: 2023-05-26

## 2023-05-26 RX ORDER — FUROSEMIDE 40 MG
1 TABLET ORAL
Qty: 3 | Refills: 0
Start: 2023-05-26 | End: 2023-05-28

## 2023-05-26 RX ORDER — APIXABAN 2.5 MG/1
1 TABLET, FILM COATED ORAL
Qty: 0 | Refills: 0 | DISCHARGE
Start: 2023-05-26

## 2023-05-26 RX ORDER — OXYCODONE AND ACETAMINOPHEN 5; 325 MG/1; MG/1
2 TABLET ORAL ONCE
Refills: 0 | Status: DISCONTINUED | OUTPATIENT
Start: 2023-05-26 | End: 2023-05-26

## 2023-05-26 RX ORDER — COLCHICINE 0.6 MG
0.6 TABLET ORAL
Refills: 0 | Status: DISCONTINUED | OUTPATIENT
Start: 2023-05-27 | End: 2023-05-26

## 2023-05-26 RX ORDER — ONDANSETRON 8 MG/1
4 TABLET, FILM COATED ORAL ONCE
Refills: 0 | Status: COMPLETED | OUTPATIENT
Start: 2023-05-26 | End: 2023-05-26

## 2023-05-26 RX ORDER — PANTOPRAZOLE SODIUM 20 MG/1
40 TABLET, DELAYED RELEASE ORAL
Refills: 0 | Status: DISCONTINUED | OUTPATIENT
Start: 2023-05-27 | End: 2023-05-26

## 2023-05-26 RX ORDER — SODIUM CHLORIDE 9 MG/ML
250 INJECTION INTRAMUSCULAR; INTRAVENOUS; SUBCUTANEOUS ONCE
Refills: 0 | Status: COMPLETED | OUTPATIENT
Start: 2023-05-26 | End: 2023-05-26

## 2023-05-26 RX ORDER — COLCHICINE 0.6 MG
0.6 TABLET ORAL ONCE
Refills: 0 | Status: COMPLETED | OUTPATIENT
Start: 2023-05-26 | End: 2023-05-26

## 2023-05-26 RX ORDER — APIXABAN 2.5 MG/1
5 TABLET, FILM COATED ORAL
Refills: 0 | Status: DISCONTINUED | OUTPATIENT
Start: 2023-05-27 | End: 2023-05-26

## 2023-05-26 RX ORDER — COLCHICINE 0.6 MG
1 TABLET ORAL
Qty: 14 | Refills: 0
Start: 2023-05-26 | End: 2023-06-01

## 2023-05-26 RX ORDER — FUROSEMIDE 40 MG
20 TABLET ORAL ONCE
Refills: 0 | Status: COMPLETED | OUTPATIENT
Start: 2023-05-26 | End: 2023-05-26

## 2023-05-26 RX ADMIN — Medication 20 MILLIGRAM(S): at 15:20

## 2023-05-26 RX ADMIN — Medication 0.6 MILLIGRAM(S): at 15:17

## 2023-05-26 RX ADMIN — SODIUM CHLORIDE 500 MILLILITER(S): 9 INJECTION INTRAMUSCULAR; INTRAVENOUS; SUBCUTANEOUS at 16:04

## 2023-05-26 RX ADMIN — APIXABAN 5 MILLIGRAM(S): 2.5 TABLET, FILM COATED ORAL at 16:35

## 2023-05-26 RX ADMIN — Medication 30 MILLIGRAM(S): at 13:46

## 2023-05-26 RX ADMIN — Medication 30 MILLIGRAM(S): at 14:20

## 2023-05-26 RX ADMIN — Medication 30 MILLILITER(S): at 14:50

## 2023-05-26 NOTE — ASU DISCHARGE PLAN (ADULT/PEDIATRIC) - CARE PROVIDER_API CALL
Moy Meza)  Cardiac Electrophysiology; Cardiology  [address] [place] [address]  Phone: [phone]  Fax: [phone]  Established Patient  Scheduled Appointment: 07/25/2023 01:30 PM

## 2023-05-26 NOTE — ASU DISCHARGE PLAN (ADULT/PEDIATRIC) - ASU DC SPECIAL INSTRUCTIONSFT
Results of Ct of abd/pelvis without contrast discussed with Dr. Zamarripa. No further workup / imaging at this time.    see printed instructions WOUND CARE:  The day AFTER your procedure  - Remove the bandage at the site GENTLY, clean with mild soap and water, and pat dry; leave open to air  - You may shower   - DO NOT apply lotions, creams, ointments, powder, parfumes to your incision site  -Check your groin every day. A small amount of bruising or soarness is normal, a bump ( smaller than nickel) might be present, normal  - DO NOT SOAK your site for 1 week ( no baths, no pools, no tubs, etc..)    ACTIVITY:  YOur procedure was done through your groin  for the next 5 DAYS:  - Limit climbing stairs, no strenous activity, pushing , pulling, or straining   DO NOT LIFT anything 10 lbs or heavier   you may resume sexual activity in 7 days, unless instructed otherwise  mild palpitations are normal     Follow heart healthy diet reccomended by your doctor, , if you smoke STOP SMOKING ( may call [phone] for center of tobacco control if you need assistance).  for the next 24 hours:   - stay at home and rest, do not drive or operate heavy [place]   do not drink alcoholic beverages   do not make important personal or business decisions     ***CALL YOUR DOCTOR ***  IF you have fever, chils, body aches, or severe pain, swelling, redness, heat, yellow drainage from your incision site  IF bleeding  or significant new swelling from your puncture site  IF you experience rapid heartbeat or palpitations that cause: lightheadness, dizziness, or fainting spell.  If you eperience difficulty swallowing, or pain with swallowing   IF unable to ge tin contact with yout doctor, you may call the Cardiology Office at [facility] at [phone]

## 2023-05-26 NOTE — ASU DISCHARGE PLAN (ADULT/PEDIATRIC) - NS MD DC FALL RISK RISK
For information on Fall & Injury Prevention, visit: [email or web address] OR  [email or web address] OR  https://www.cdc.gov/steadi/patient.html

## 2023-05-26 NOTE — CHART NOTE - NSCHARTNOTEFT_GEN_A_CORE
Reference #: [id number]      Gerald Smart	1965	Male	[id number]	[facility]	[place]	[id number]    Prescription Information      PDI Filter:    PDI	My Rx	Current Rx	Drug Type	Rx Written	Rx Dispensed	Drug	Quantity	Days Supply	Prescriber Name	Prescriber ORQUIDEA #	Payment Method	Dispenser  A	N	N	B	03/15/2023	03/17/2023	lorazepam 0.5 mg tablet	5	5	Sharon Peng MD	[id number]	Medicaid	[id number]

## 2023-05-26 NOTE — ASU DISCHARGE PLAN (ADULT/PEDIATRIC) - PROVIDER TOKENS
PROVIDER:[TOKEN:[[id number]],SCHEDULEDAPPT:[07/25/2023],SCHEDULEDAPPTTIME:[01:30 PM],ESTABLISHEDPATIENT:[T]]

## 2023-05-26 NOTE — PRE-ANESTHESIA EVALUATION ADULT - NSANTHPMHFT_GEN_ALL_CORE
The pt is a 58 yo M h/o HTN, HLD, CAD (medical management), A fib on Eliquis, moderately reduced EF 43%, stable abdominal mass here for a fib ablation  ET > 4 METS, no CP, mild SOB, able to walk over a mile  No current nausea / recent vomiting, ROS otherwise neg

## 2023-05-26 NOTE — H&P CARDIOLOGY - HISTORY OF PRESENT ILLNESS
This is a 58y/o  M with no known implantable devices noted COVID 19 negative Vaccinated with Pfizer x 2 Booster x 1 with PMHx of Persistent AFIB on Eliquis last dose this am 5/26/23  HTN and CAD. HLD, GERD, Snoring, over weight and paroxysmal atrial fibrillation during ortho procedure and converted spontaneously presents for cardiac cath. Pt reports he had sweating and jitteriness, noted to be in atrial fibrillation with a rapid ventricular response, evaluated by Dr. Lisker. Metoprolol started and rate has been controlled,  Pt reports having occasional palpitations "Flutter in chest ". comes and goes . He also has a history of an intestinal mass that is being monitored with annual MRI which has reportedly not grown for the past 2 years.  Had  CHRISTIANO guided DCCV  with Dr. Meza last month. Presents today for AFIB Ablation with Dr. Meza . Currently CP free no sob no palpitations noted.   < from: CHRISTIANO W or WO Ultrasound Enhancing Agent (04.13.23 @ 12:02) >  CONCLUSIONS:      1. Moderately enlarged right ventricular cavity size and normal systolic function.   2. No thrombus seen in the left atrial, left atrial appendage, right atrial or right atrial appendage.   3. No prior echocardiogram is available for comparison.   4. The left atrial appendage emptying velocity is normal at 71 cm/s.    ____________________________________________________________________  CHRISTIANO Procedure:  Local oropharyngeal anesthetic was provided with viscouslidocaine. Study was performed with sedation - see anesthesia record. The adult Stephenie CHRISTIANO probe was introduced and passed into the esophagus. The CHRISTIANO probe was passed without difficulty. Images were obtained with the patient in a left lateral decubitus position. Image quality was good. The patient's vital signs; including heart rate, blood pressure, and oxygen saturation; remained stable throughout the procedure. The patient tolerated the procedure well and without complications.  ________________________________________________________________________________________  FINDINGS:  Left Ventricle:  The left ventricular systolic function is moderately decreased.     Right Ventricle:  Moderately enlarged right ventricular cavity size and normalsystolic function.     Left Atrium:  The left atrium is moderately dilated, with an indexed volume of 44 ml/m². There is no evidence of left atrial or left atrial appendage thrombus. The left atrial appendage emptying velocity is normal at 71 cm/s (normal >40cm/s).     Right Atrium:  The right atrium is mildly dilated with an indexed volume of 34.25 ml/m².     Interatrial Septum:  There is no evidence of a patent foramen ovale by color flow Doppler.     Aortic Valve:  There is trace aortic regurgitation.     Mitral Valve:  There is calcification of the mitral valve annulus. There is mild mitral regurgitation.     Tricuspid Valve:  There is mild tricuspid regurgitation.     Pulmonic Valve:  The pulmonary valve is normal in structure and function, with good leaflet excursion, and without any evidence of pulmonary stenosis or significant regurgitation.     Pericardium:  No pericardial effusion seen.  ____________________________________________________________________  QUANTITATIVE DATA  Aorta Measurements     Ao Sinus: 4.1 cm       Left Atrium Measurements                     Indexed to BSA  LA Vol BP: 90.0 ml 44 ml/m²    Right Atrial Measurements     RA Vol:       70.00 ml  RA Vol Index: 34.25 ml/m²    LVOT / RVOT/ Qp/Qs Data:     --------------------------------------------------------------------------------  [employer]  LV Analysis  EF: 43 %       ________________________________________________________________________________________  Electronically signed by Keyonna Loera MD on 4/13/2023 at 5:18:58 PM         *** Final ***    < end of copied text >      < from: Cardiac Catheterization (03.20.23 @ 08:57) >  Diagnostic Findings:     Coronary Angiography   The coronary circulation is right dominant.      LM   Distal left main: There is a 25 % stenosis.      LAD   Proximal left anterior descending: There is a 60 % stenosis. Instant  wave-free ratio was performed with a calculated value of  0.93. Based on the results of this study, the lesion was judged to be  non-significant and no intervention was performed. First  diagonal: Angiography shows minor irregularities. Second diagonal:  There is a 30 % stenosis.    CX   Ostial circumflex: There is a 70 % stenosis. Instant wave-free ratio  was performed with a calculated value of 0.98. Based on the  results of this study, the lesion was judged to be non-significant and  no intervention was performed.    Patient: JOSE ZHENG          MRN: [id number]  Study Date: 03/20/2023   08:57 AM      Page 1 of 3          RCA   Proximal right coronary artery: There is a 30 % stenosis. Mid right  coronary artery: There is a 40 % stenosis. First right  posterolateral: There is a 50 % stenosis.      < end of copied text >  < from: Transthoracic Echocardiogram (01.08.16 @ 12:44) >  Conclusions:  1. Normal mitral valve. Minimal mitral regurgitation.  2. Normal trileaflet aortic valve.  3. Mild aortic root dilatation. Consider CT of chest for  further evaluation  4. Normal left atrium.  5. Normal left ventricular internal dimensions and wall  thicknesses.  6. Normal Left Ventricular Systolic Function,  (EF = 55%)  7. Grade I diastolic dysfunction  8. Normal right atrium.  9. Normal right ventricular size and function.  10. Unable to estimate RVSP.  11. Normal tricuspid valve.  12. There is mild pulmonic regurgitation.  13. Normal pericardium with no pericardial effusion.      ------------------------------------------------------------------------    Confirmed on  1/9/2016 - 09:08:43 by Marva Valdez MD  ------------------------------------------------------------------------    < end of copied text >    Card: Dr. J Lisker             This is a 56y/o  M with no known implantable devices noted COVID 19 negative Vaccinated with Pfizer x 2 Booster x 1 with PMHx of Persistent AFIB on Eliquis last dose this am 5/26/23  HTN and CAD. HLD, GERD, Snoring, over weight and paroxysmal atrial fibrillation during ortho procedure and converted spontaneously presents for cardiac cath. Pt reports he had sweating and jitteriness, noted to be in atrial fibrillation with a rapid ventricular response, evaluated by Dr. Lisker. Metoprolol started and rate has been controlled,  Pt reports having occasional palpitations "Flutter in chest "comes and goes . He also has a history of an intestinal mass that is being monitored with annual MRI which has reportedly not grown for the past 2 years.  Had  CHRISTIANO guided DCCV  with Dr. Meza last month. Presents today for AFIB Ablation with Dr. Meza . Currently CP free no sob no palpitations noted.   < from: CHRISTIANO W or WO Ultrasound Enhancing Agent (04.13.23 @ 12:02) >  CONCLUSIONS:      1. Moderately enlarged right ventricular cavity size and normal systolic function.   2. No thrombus seen in the left atrial, left atrial appendage, right atrial or right atrial appendage.   3. No prior echocardiogram is available for comparison.   4. The left atrial appendage emptying velocity is normal at 71 cm/s.    ____________________________________________________________________  CHRISTIANO Procedure:  Local oropharyngeal anesthetic was provided with viscouslidocaine. Study was performed with sedation - see anesthesia record. The adult Stephenie CHRISTIANO probe was introduced and passed into the esophagus. The CHRISTIANO probe was passed without difficulty. Images were obtained with the patient in a left lateral decubitus position. Image quality was good. The patient's vital signs; including heart rate, blood pressure, and oxygen saturation; remained stable throughout the procedure. The patient tolerated the procedure well and without complications.  ________________________________________________________________________________________  FINDINGS:  Left Ventricle:  The left ventricular systolic function is moderately decreased.     Right Ventricle:  Moderately enlarged right ventricular cavity size and normalsystolic function.     Left Atrium:  The left atrium is moderately dilated, with an indexed volume of 44 ml/m². There is no evidence of left atrial or left atrial appendage thrombus. The left atrial appendage emptying velocity is normal at 71 cm/s (normal >40cm/s).     Right Atrium:  The right atrium is mildly dilated with an indexed volume of 34.25 ml/m².     Interatrial Septum:  There is no evidence of a patent foramen ovale by color flow Doppler.     Aortic Valve:  There is trace aortic regurgitation.     Mitral Valve:  There is calcification of the mitral valve annulus. There is mild mitral regurgitation.     Tricuspid Valve:  There is mild tricuspid regurgitation.     Pulmonic Valve:  The pulmonary valve is normal in structure and function, with good leaflet excursion, and without any evidence of pulmonary stenosis or significant regurgitation.     Pericardium:  No pericardial effusion seen.  ____________________________________________________________________  QUANTITATIVE DATA  Aorta Measurements     Ao Sinus: 4.1 cm       Left Atrium Measurements                     Indexed to BSA  LA Vol BP: 90.0 ml 44 ml/m²    Right Atrial Measurements     RA Vol:       70.00 ml  RA Vol Index: 34.25 ml/m²    LVOT / RVOT/ Qp/Qs Data:     --------------------------------------------------------------------------------  [employer]  LV Analysis  EF: 43 %       ________________________________________________________________________________________  Electronically signed by Keyonna Loera MD on 4/13/2023 at 5:18:58 PM         *** Final ***    < end of copied text >      < from: Cardiac Catheterization (03.20.23 @ 08:57) >  Diagnostic Findings:     Coronary Angiography   The coronary circulation is right dominant.      LM   Distal left main: There is a 25 % stenosis.      LAD   Proximal left anterior descending: There is a 60 % stenosis. Instant  wave-free ratio was performed with a calculated value of  0.93. Based on the results of this study, the lesion was judged to be  non-significant and no intervention was performed. First  diagonal: Angiography shows minor irregularities. Second diagonal:  There is a 30 % stenosis.    CX   Ostial circumflex: There is a 70 % stenosis. Instant wave-free ratio  was performed with a calculated value of 0.98. Based on the  results of this study, the lesion was judged to be non-significant and  no intervention was performed.    Patient: JOSE ZHENG          MRN: [id number]  Study Date: 03/20/2023   08:57 AM      Page 1 of 3          RCA   Proximal right coronary artery: There is a 30 % stenosis. Mid right  coronary artery: There is a 40 % stenosis. First right  posterolateral: There is a 50 % stenosis.      < end of copied text >  < from: Transthoracic Echocardiogram (01.08.16 @ 12:44) >  Conclusions:  1. Normal mitral valve. Minimal mitral regurgitation.  2. Normal trileaflet aortic valve.  3. Mild aortic root dilatation. Consider CT of chest for  further evaluation  4. Normal left atrium.  5. Normal left ventricular internal dimensions and wall  thicknesses.  6. Normal Left Ventricular Systolic Function,  (EF = 55%)  7. Grade I diastolic dysfunction  8. Normal right atrium.  9. Normal right ventricular size and function.  10. Unable to estimate RVSP.  11. Normal tricuspid valve.  12. There is mild pulmonic regurgitation.  13. Normal pericardium with no pericardial effusion.      ------------------------------------------------------------------------    Confirmed on  1/9/2016 - 09:08:43 by Marva Valdez MD  ------------------------------------------------------------------------    < end of copied text >    Card: Dr. J Lisker

## 2023-05-26 NOTE — CHART NOTE - NSCHARTNOTEFT_GEN_A_CORE
This is a 58y/o  M with  PMHx of Persistent AFIB on Eliquis last dose this am 5/26/23  HTN and CAD. HLD, GERD, Snoring, and paroxysmal atrial fibrillation during ortho procedure and converted spontaneously.  Had  CHRISTIANO guided DCCV  with Dr. Meza last month, back in Afib;  presents here today for AFIB Ablation.    5/26 s/p Afib ablation via RFV, in CRS during recovering pt was complaining of chest "burning" for which he was administered Toradol 30mg IV [id number] CRS patient came back to [facility] still with chest burning, unrelieved by Toradol, now s/p Maalox, and Colchicine  cont to verb of chest burning unrelieved by the above meds, rated 5/10, non radiating, located mid anterior chest  vs stable /76 HR 70s Sinus Rhythm , ECG unchanged ( SR 71 bpm with no significant ST- T wave abnormalities)   no visible chest burning noted from the shocks that occurred in EP lab during ablation. Dr eMza made aware, Percocet prescribed, will cont to re-assess pain and status.  Patient made aware of possibility of overnight admission for closer monitoring if he does not get better.       Physical Exam:  Appearance: Normal  Eyes: PERRL, EOMI  Cardiovascular: S1S2, RRR, No M/R/G, no JVD, No Lower extremity edema  Procedural Access Site: No hematoma, Non-tender to palpation, 2+ pulse, No bruit, No Ecchymosis  Respiratory: Clear to auscultation bilaterally  Neurologic: Non-focal  Psychiatry: AAOx3            ADDENDUM to Above note   pt received Lasix 20mg IV x1 as per Dr Meza, s/p OOB to bathroom.. pt reports " I urinated so much"  when he got back to bed noted to have a possible vaso-vagal response; pr verbalized nausea , overall "unwell feeling", +diaphoresis   SBP dropped down to 82  pt layed flat, legs elevated, Zofran 4mg IV x1 and [id number] IV bolus ordered.  R groin access stable, soft, wo bleeding, hematoma or pain   now back patient and vitals back to baseline ( see flow sheet for details)  Dr Meza made aware  will cont to monitor on tele This is a 58y/o  M with  PMHx of Persistent AFIB on Eliquis last dose this am 5/26/23  HTN and CAD. HLD, GERD, Snoring, and paroxysmal atrial fibrillation during ortho procedure and converted spontaneously.  Had  CHRISTIANO guided DCCV  with Dr. Meza last month, back in Afib;  presents here today for AFIB Ablation.    5/26 s/p Afib ablation via RFV, in CRS during recovering pt was complaining of chest "burning" for which he was administered Toradol 30mg IV [id number] CRS patient came back to [facility] still with chest burning, unrelieved by Toradol, now s/p Maalox, and Colchicine  cont to verb of chest/ skin burning unrelieved by the above meds, rated 5/10, non radiating, located mid anterior chest  vs stable /76 HR 70s Sinus Rhythm , ECG unchanged ( SR 71 bpm with no significant ST- T wave abnormalities)   no visible chest burning noted from the shocks that occurred in EP lab during ablation. Dr Meza made aware, Percocet prescribed, will cont to re-assess pain and status.  Patient made aware of possibility of overnight admission for closer monitoring if he does not get better.       Physical Exam:  Appearance: Normal  Eyes: PERRL, EOMI  Cardiovascular: S1S2, RRR, No M/R/G, no JVD, No Lower extremity edema  Procedural Access Site: No hematoma, Non-tender to palpation, 2+ pulse, No bruit, No Ecchymosis  Respiratory: Clear to auscultation bilaterally  Neurologic: Non-focal  Psychiatry: AAOx3            ADDENDUM to Above note   pt received Lasix 20mg IV x1 as per Dr Meza, s/p OOB to bathroom.. pt reports " I urinated so much"  when he got back to bed noted to have a possible vaso-vagal response; pr verbalized nausea , overall "unwell feeling", +diaphoresis   SBP dropped down to 82  pt layed flat, legs elevated, Zofran 4mg IV x1 and [id number] IV bolus ordered.  R groin access stable, soft, wo bleeding, hematoma or pain   now back patient and vitals back to baseline ( see flow sheet for details)  Dr Meza made aware  will cont to monitor on tele This is a 58y/o  M with  PMHx of Persistent AFIB on Eliquis last dose this am 5/26/23  HTN and CAD. HLD, GERD, Snoring, and paroxysmal atrial fibrillation during ortho procedure and converted spontaneously.  Had  CHRISTIANO guided DCCV  with Dr. Meza last month, back in Afib;  presents here today for AFIB Ablation.    5/26 s/p Afib ablation via RFV, in CRS during recovering pt was complaining of chest "burning" for which he was administered Toradol 30mg IV [id number] CRS patient came back to [facility] still with chest burning, unrelieved by Toradol, now s/p Maalox, and Colchicine  cont to verb of chest burning unrelieved by the above meds, rated 5/10, non radiating, located mid anterior chest  vs stable /76 HR 70s Sinus Rhythm , ECG unchanged ( SR 71 bpm with no significant ST- T wave abnormalities)   no visible superficial  chest/skin  burning noted from the shocks that occurred in EP lab during ablation. Dr Meza made aware, Percocet prescribed, will cont to re-assess pain and status.  Patient made aware of possibility of overnight admission for closer monitoring if he does not get better.       Physical Exam:  Appearance: Normal  Eyes: PERRL, EOMI  Cardiovascular: S1S2, RRR, No M/R/G, no JVD, No Lower extremity edema  Procedural Access Site: No hematoma, Non-tender to palpation, 2+ pulse, No bruit, No Ecchymosis  Respiratory: Clear to auscultation bilaterally  Neurologic: Non-focal  Psychiatry: AAOx3            ADDENDUM to Above note   pt received Lasix 20mg IV x1 as per Dr Meza, s/p OOB to bathroom.. pt reports " I urinated so much"  when he got back to bed noted to have a possible vaso-vagal response; pr verbalized nausea , overall "unwell feeling", +diaphoresis   SBP dropped down to 82  pt layed flat, legs elevated, Zofran 4mg IV x1 and [id number] IV bolus ordered.  R groin access stable, soft, wo bleeding, hematoma or pain   now back patient and vitals back to baseline ( see flow sheet for details)  Dr Meza made aware  will cont to monitor on tele This is a 56y/o  M with  PMHx of Persistent AFIB on Eliquis last dose this am 5/26/23  HTN and CAD. HLD, GERD, Snoring, and paroxysmal atrial fibrillation during ortho procedure and converted spontaneously.  Had  CHRISTIANO guided DCCV  with Dr. Meza last month, back in Afib;  presents here today for AFIB Ablation.    5/26 s/p Afib ablation via RFV, in CRS during recovering pt was complaining of chest "burning" for which he was administered Toradol 30mg IV [id number] CRS patient came back to [facility] still with chest burning, unrelieved by Toradol, now s/p Maalox, and Colchicine  cont to verb of chest burning unrelieved by the above meds, rated 5/10, non radiating, located mid anterior chest  vs stable /76 HR 70s Sinus Rhythm , ECG unchanged ( SR 71 bpm with no significant ST- T wave abnormalities)   no visible superficial  chest/skin  burning noted from the shocks that occurred in EP lab during ablation. Dr Meza made aware, Percocet prescribed, will cont to re-assess pain and status.  Patient made aware of possibility of overnight admission for closer monitoring if he does not get better.       Physical Exam:  Appearance: Normal  Eyes: PERRL, EOMI  Cardiovascular: S1S2, RRR, No M/R/G, no JVD, No Lower extremity edema  Procedural Access Site: No hematoma, Non-tender to palpation, 2+ pulse, No bruit, No Ecchymosis  Respiratory: Clear to auscultation bilaterally  Neurologic: Non-focal  Psychiatry: AAOx3            ADDENDUM to Above note   pt received Lasix 20mg IV x1 as per Dr Meza, s/p OOB to bathroom.. pt reports " I urinated so much"  when he got back to bed noted to have a possible vaso-vagal response; pr verbalized nausea , overall "unwell feeling", +diaphoresis   SBP dropped down to 82  pt layed flat, legs elevated, Zofran 4mg IV x1 and [id number] IV bolus ordered.  R groin access stable, soft, wo bleeding, hematoma or pain   now back patient and vitals back to baseline ( see flow sheet for details)  Dr Meza made aware  will cont to monitor on tele    ADDENDUM  chest burning all resolved now rated a "0"  vitals stable, states " I feel great" , wife at bedside, pt would like to be dc home tonight  Dr Meza made aware and to decide on discharge later this evening

## 2023-05-27 ENCOUNTER — NON-APPOINTMENT (OUTPATIENT)
Age: 58
End: 2023-05-27

## 2023-05-27 ENCOUNTER — TRANSCRIPTION ENCOUNTER (OUTPATIENT)
Age: 58
End: 2023-05-27

## 2023-05-31 ENCOUNTER — APPOINTMENT (OUTPATIENT)
Dept: CARDIOLOGY | Facility: CLINIC | Age: 58
End: 2023-05-31
Payer: COMMERCIAL

## 2023-05-31 ENCOUNTER — INPATIENT (INPATIENT)
Facility: HOSPITAL | Age: 58
LOS: 1 days | Discharge: HOME | DRG: 310 | End: 2023-06-02
Attending: STUDENT IN AN ORGANIZED HEALTH CARE EDUCATION/TRAINING PROGRAM | Admitting: STUDENT IN AN ORGANIZED HEALTH CARE EDUCATION/TRAINING PROGRAM
Payer: COMMERCIAL

## 2023-05-31 ENCOUNTER — NON-APPOINTMENT (OUTPATIENT)
Age: 58
End: 2023-05-31

## 2023-05-31 VITALS
DIASTOLIC BLOOD PRESSURE: 86 MMHG | WEIGHT: 212.08 LBS | HEART RATE: 80 BPM | TEMPERATURE: 99 F | RESPIRATION RATE: 16 BRPM | OXYGEN SATURATION: 92 % | HEIGHT: 68 IN | SYSTOLIC BLOOD PRESSURE: 122 MMHG

## 2023-05-31 VITALS
BODY MASS INDEX: 33.27 KG/M2 | SYSTOLIC BLOOD PRESSURE: 104 MMHG | HEART RATE: 73 BPM | WEIGHT: 212 LBS | OXYGEN SATURATION: 97 % | DIASTOLIC BLOOD PRESSURE: 68 MMHG | HEIGHT: 67 IN

## 2023-05-31 DIAGNOSIS — Z98.890 OTHER SPECIFIED POSTPROCEDURAL STATES: Chronic | ICD-10-CM

## 2023-05-31 DIAGNOSIS — Z98.89 OTHER SPECIFIED POSTPROCEDURAL STATES: Chronic | ICD-10-CM

## 2023-05-31 DIAGNOSIS — I48.92 UNSPECIFIED ATRIAL FLUTTER: ICD-10-CM

## 2023-05-31 LAB
ALBUMIN SERPL ELPH-MCNC: 4.3 G/DL — SIGNIFICANT CHANGE UP (ref 3.3–5)
ALBUMIN SERPL ELPH-MCNC: 4.4 G/DL — SIGNIFICANT CHANGE UP (ref 3.3–5)
ALP SERPL-CCNC: 109 U/L — SIGNIFICANT CHANGE UP (ref 40–120)
ALP SERPL-CCNC: 94 U/L — SIGNIFICANT CHANGE UP (ref 40–120)
ALT FLD-CCNC: 56 U/L — HIGH (ref 10–45)
ALT FLD-CCNC: 77 U/L — HIGH (ref 10–45)
ANION GAP SERPL CALC-SCNC: 15 MMOL/L — SIGNIFICANT CHANGE UP (ref 5–17)
ANION GAP SERPL CALC-SCNC: 18 MMOL/L — HIGH (ref 5–17)
AST SERPL-CCNC: 39 U/L — SIGNIFICANT CHANGE UP (ref 10–40)
AST SERPL-CCNC: 90 U/L — HIGH (ref 10–40)
BASE EXCESS BLDV CALC-SCNC: -1.3 MMOL/L — SIGNIFICANT CHANGE UP (ref -2–3)
BASOPHILS # BLD AUTO: 0.03 K/UL — SIGNIFICANT CHANGE UP (ref 0–0.2)
BASOPHILS NFR BLD AUTO: 0.3 % — SIGNIFICANT CHANGE UP (ref 0–2)
BILIRUB SERPL-MCNC: 1.2 MG/DL — SIGNIFICANT CHANGE UP (ref 0.2–1.2)
BILIRUB SERPL-MCNC: 1.3 MG/DL — HIGH (ref 0.2–1.2)
BUN SERPL-MCNC: 22 MG/DL — SIGNIFICANT CHANGE UP (ref 7–23)
BUN SERPL-MCNC: 22 MG/DL — SIGNIFICANT CHANGE UP (ref 7–23)
CA-I SERPL-SCNC: 1.16 MMOL/L — SIGNIFICANT CHANGE UP (ref 1.15–1.33)
CALCIUM SERPL-MCNC: 9.4 MG/DL — SIGNIFICANT CHANGE UP (ref 8.4–10.5)
CALCIUM SERPL-MCNC: 9.6 MG/DL — SIGNIFICANT CHANGE UP (ref 8.4–10.5)
CHLORIDE BLDV-SCNC: 105 MMOL/L — SIGNIFICANT CHANGE UP (ref 96–108)
CHLORIDE SERPL-SCNC: 105 MMOL/L — SIGNIFICANT CHANGE UP (ref 96–108)
CHLORIDE SERPL-SCNC: 106 MMOL/L — SIGNIFICANT CHANGE UP (ref 96–108)
CK MB CFR SERPL CALC: 2.5 NG/ML — SIGNIFICANT CHANGE UP (ref 0–6.7)
CO2 BLDV-SCNC: 24 MMOL/L — SIGNIFICANT CHANGE UP (ref 22–26)
CO2 SERPL-SCNC: 16 MMOL/L — LOW (ref 22–31)
CO2 SERPL-SCNC: 20 MMOL/L — LOW (ref 22–31)
CREAT SERPL-MCNC: 0.93 MG/DL — SIGNIFICANT CHANGE UP (ref 0.5–1.3)
CREAT SERPL-MCNC: 0.96 MG/DL — SIGNIFICANT CHANGE UP (ref 0.5–1.3)
EGFR: 93 ML/MIN/1.73M2 — SIGNIFICANT CHANGE UP
EGFR: 96 ML/MIN/1.73M2 — SIGNIFICANT CHANGE UP
EOSINOPHIL # BLD AUTO: 0.1 K/UL — SIGNIFICANT CHANGE UP (ref 0–0.5)
EOSINOPHIL NFR BLD AUTO: 1 % — SIGNIFICANT CHANGE UP (ref 0–6)
GAS PNL BLDV: 134 MMOL/L — LOW (ref 136–145)
GAS PNL BLDV: SIGNIFICANT CHANGE UP
GAS PNL BLDV: SIGNIFICANT CHANGE UP
GLUCOSE BLDV-MCNC: 96 MG/DL — SIGNIFICANT CHANGE UP (ref 70–99)
GLUCOSE SERPL-MCNC: 105 MG/DL — HIGH (ref 70–99)
GLUCOSE SERPL-MCNC: 98 MG/DL — SIGNIFICANT CHANGE UP (ref 70–99)
HCO3 BLDV-SCNC: 23 MMOL/L — SIGNIFICANT CHANGE UP (ref 22–29)
HCT VFR BLD CALC: 45.3 % — SIGNIFICANT CHANGE UP (ref 39–50)
HCT VFR BLDA CALC: 51 % — SIGNIFICANT CHANGE UP (ref 39–51)
HGB BLD CALC-MCNC: 17 G/DL — SIGNIFICANT CHANGE UP (ref 12.6–17.4)
HGB BLD-MCNC: 15.7 G/DL — SIGNIFICANT CHANGE UP (ref 13–17)
IMM GRANULOCYTES NFR BLD AUTO: 1 % — HIGH (ref 0–0.9)
LACTATE BLDV-MCNC: 1.7 MMOL/L — SIGNIFICANT CHANGE UP (ref 0.5–2)
LYMPHOCYTES # BLD AUTO: 1.44 K/UL — SIGNIFICANT CHANGE UP (ref 1–3.3)
LYMPHOCYTES # BLD AUTO: 14.3 % — SIGNIFICANT CHANGE UP (ref 13–44)
MAGNESIUM SERPL-MCNC: 2.1 MG/DL — SIGNIFICANT CHANGE UP (ref 1.6–2.6)
MCHC RBC-ENTMCNC: 29.7 PG — SIGNIFICANT CHANGE UP (ref 27–34)
MCHC RBC-ENTMCNC: 34.7 GM/DL — SIGNIFICANT CHANGE UP (ref 32–36)
MCV RBC AUTO: 85.8 FL — SIGNIFICANT CHANGE UP (ref 80–100)
MONOCYTES # BLD AUTO: 0.73 K/UL — SIGNIFICANT CHANGE UP (ref 0–0.9)
MONOCYTES NFR BLD AUTO: 7.2 % — SIGNIFICANT CHANGE UP (ref 2–14)
NEUTROPHILS # BLD AUTO: 7.68 K/UL — HIGH (ref 1.8–7.4)
NEUTROPHILS NFR BLD AUTO: 76.2 % — SIGNIFICANT CHANGE UP (ref 43–77)
NRBC # BLD: 0 /100 WBCS — SIGNIFICANT CHANGE UP (ref 0–0)
PCO2 BLDV: 36 MMHG — LOW (ref 42–55)
PH BLDV: 7.41 — SIGNIFICANT CHANGE UP (ref 7.32–7.43)
PHOSPHATE SERPL-MCNC: 3.9 MG/DL — SIGNIFICANT CHANGE UP (ref 2.5–4.5)
PLATELET # BLD AUTO: 220 K/UL — SIGNIFICANT CHANGE UP (ref 150–400)
PO2 BLDV: 54 MMHG — HIGH (ref 25–45)
POTASSIUM BLDV-SCNC: 5.1 MMOL/L — SIGNIFICANT CHANGE UP (ref 3.5–5.1)
POTASSIUM SERPL-MCNC: 3.9 MMOL/L — SIGNIFICANT CHANGE UP (ref 3.5–5.3)
POTASSIUM SERPL-MCNC: SIGNIFICANT CHANGE UP (ref 3.5–5.3)
POTASSIUM SERPL-SCNC: 3.9 MMOL/L — SIGNIFICANT CHANGE UP (ref 3.5–5.3)
POTASSIUM SERPL-SCNC: SIGNIFICANT CHANGE UP (ref 3.5–5.3)
PROT SERPL-MCNC: 7.3 G/DL — SIGNIFICANT CHANGE UP (ref 6–8.3)
PROT SERPL-MCNC: 7.7 G/DL — SIGNIFICANT CHANGE UP (ref 6–8.3)
RBC # BLD: 5.28 M/UL — SIGNIFICANT CHANGE UP (ref 4.2–5.8)
RBC # FLD: 13.4 % — SIGNIFICANT CHANGE UP (ref 10.3–14.5)
SAO2 % BLDV: 86.3 % — SIGNIFICANT CHANGE UP (ref 67–88)
SODIUM SERPL-SCNC: 140 MMOL/L — SIGNIFICANT CHANGE UP (ref 135–145)
SODIUM SERPL-SCNC: 140 MMOL/L — SIGNIFICANT CHANGE UP (ref 135–145)
TROPONIN T, HIGH SENSITIVITY RESULT: 96 NG/L — HIGH (ref 0–51)
WBC # BLD: 10.08 K/UL — SIGNIFICANT CHANGE UP (ref 3.8–10.5)
WBC # FLD AUTO: 10.08 K/UL — SIGNIFICANT CHANGE UP (ref 3.8–10.5)

## 2023-05-31 PROCEDURE — 93000 ELECTROCARDIOGRAM COMPLETE: CPT

## 2023-05-31 PROCEDURE — 99285 EMERGENCY DEPT VISIT HI MDM: CPT

## 2023-05-31 PROCEDURE — 99214 OFFICE O/P EST MOD 30 MIN: CPT | Mod: 25

## 2023-05-31 PROCEDURE — 71046 X-RAY EXAM CHEST 2 VIEWS: CPT | Mod: 26

## 2023-05-31 RX ORDER — SOTALOL HCL 120 MG
80 TABLET ORAL ONCE
Refills: 0 | Status: COMPLETED | OUTPATIENT
Start: 2023-05-31 | End: 2023-05-31

## 2023-05-31 RX ORDER — APIXABAN 2.5 MG/1
5 TABLET, FILM COATED ORAL ONCE
Refills: 0 | Status: COMPLETED | OUTPATIENT
Start: 2023-05-31 | End: 2023-05-31

## 2023-05-31 NOTE — H&P ADULT - PROBLEM SELECTOR PLAN 1
h/o afib/flutter s/p dccv and recent ablation;   ?intermittent AFib/Flutter since his procedure  ekg with aflutter 2:1 conduction w hr ~100  ep consulted; recommending NPO after midnight for DCCV in the AM, no need for tte/efrain as pt has been adherent with AC  follow up tsh; orthostatic vitals  monitor on telemetry  cont home ac w eliquis  hold home toprol  start sotalol 80 bid  ep follow up for tentative cardioversion in am

## 2023-05-31 NOTE — H&P ADULT - HISTORY OF PRESENT ILLNESS
56 yo m w pmh obesity, kenia, htn, hld, afib/flutter s/p dccv and recent ablation, cad, gerd, p/w n+v and dizziness. patient recently underwent afib ablation on 5/26; he had been feeling well since the procedure, until today. while at work today, after sitting for long period of time, he stood up and was overcome with dizziness a/w n+v; he felt as though he was about to faint, but denies actually losing consciousness. denies chest pain, palpitations, visual/auditory disturbances, jerking movements, urinary incontinence, tongue bites at the time of event. his coworkers grew concerned for him, so contacted ems to have patient bibems to [facility] er for further evaluation.    of note, patient saw his outpatient cardiologist earlier today, prior to this event, and was doing fine with plans to continue routine follow up; his home meds were adjusted so that his amlodipine dose decreased from 10 to 5 and his toprol dose increased from 25 to 75.      in er, suspect 2/2 presyncope, found to be in aflutter, admitted to medicine for further mgmt

## 2023-05-31 NOTE — ED ADULT NURSE NOTE - CHPI ED NUR SYMPTOMS NEG
no back pain/no chest pain/no chills/no congestion/no diaphoresis/no fever/no shortness of breath/no syncope

## 2023-05-31 NOTE — H&P ADULT - PROBLEM SELECTOR PLAN 2
h/o cad  no clinft of acs; 1st trop ~96; ekg with no new st seg - t wave changes suggestive of acute ischemia  follow up 2nd trop  Monitor for chest pain, telemetry/EKG changes  cont home colchicine + plavix, statin, bb

## 2023-05-31 NOTE — DISCUSSION/SUMMARY
[FreeTextEntry1] : He is a 57-year-old with hypertension, GERD and episodes of atrial flutter/fibrillation.\par Now with diffuse coronary atherosclerosis without any definitive obstructive lesion.\par CAD: No anginal symptoms.  He is tolerating aggressive medical therapy with Plavix and colchicine.  No aspirin due to concomitant NOAC use.\par Continue high-dose statin with a goal LDL less than 70 mg/dL.\par \par A-fib: Now back in atrial fibrillation. Likely inflammatory post procedure. Plan reviewed with Dr. Meza.\par Will increase Toprol to 75 mg.\par Reduce norvasc to 5 mg.\par \par Continue oral anticoagulation for stroke risk reduction.\par See Dr. Meza in 2 weeks. See me in 4 weeks. [EKG obtained to assist in diagnosis and management of assessed problem(s)] : EKG obtained to assist in diagnosis and management of assessed problem(s)

## 2023-05-31 NOTE — ED PROVIDER NOTE - CLINICAL SUMMARY MEDICAL DECISION MAKING FREE TEXT BOX
Jonathan; 57 year old male with pmhx of CAD, htn, hld, afib on eliquis s/p ablation on 5/26 here with n/v, lightheadness, x today. was at work at "[employer] and felt naeous and lightheaded.  told coworker and coworker held him. no syncope. will get labs, cxr, ekg, cardiac enzymes, cards consult, reassess

## 2023-05-31 NOTE — HISTORY OF PRESENT ILLNESS
[FreeTextEntry1] : s/p AF ablation 5/26/2023.\par He feels okay. Taking toprol 50 qd.\par No chest pain or dyspnea.\par \par Prior:\par s/p CHRISTIANO cardioversion.\par Doing well. No bleeding or bruising on his current medical regimen.\par He denies any exertional chest pains or shortness of breath though he is not performing routine aerobic activity.  He is fairly active as a  at work.\par \par \par Prior:\par Coronary angio: Moderate diffuse disease. See report.\par NO angina.\par Wants to know\par \par Prior:\par Dear Sharon,\par Thank you for referring him for cardiovascular evaluation.  He is a 57-year-old with a history of paroxysmal atrial fibrillation and hypertension who presented to your office with sweating, jitteriness and was noted to be in atrial fibrillation with a rapid ventricular response.\par He reports that approximately 2 weeks ago his work time schedule was changed drastically and he was under severe stress.\par He started reporting feeling shaky and jittery as well as some shortness of breath.\par He had no chest pains, orthopnea, PND or leg edema.\par When he was seen in your office he was noted to be in atrial fibrillation with a rapid ventricular response.\par His previous atrial fibrillation episode occurred in the setting of the operating room and resolved with beta-blockade.\par He has a history of hypertension, GERD and an intestinal mass that is being monitored with annual MRI which has reportedly not grown for the past 2 years.\par He has a history of snoring.\par He has no history of coronary artery disease, diabetes mellitus, stroke, renal insufficiency or congestive heart failure.\par

## 2023-05-31 NOTE — H&P ADULT - NSHPREVIEWOFSYSTEMS_GEN_ALL_CORE
CONSTITUTIONAL: No fever. no weakness  ENMT:  No sinus or throat pain  RESPIRATORY: No cough, wheezing, chills or hemoptysis; No shortness of breath  CARDIOVASCULAR: No chest pain, palpitations, +dizziness   GASTROINTESTINAL: No abdominal or epigastric pain. + nausea + vomiting, no hematemesis; No diarrhea or constipation. No melena or hematochezia.  GENITOURINARY: No dysuria or incontinence  NEUROLOGICAL: No headaches, memory loss, loss of strength, numbness, or tremors  SKIN: No rashes,  No hives or eczema  ENDOCRINE: No heat or cold intolerance; No hair loss  MUSCULOSKELETAL: No joint pain or swelling; No muscle, back, or extremity pain  PSYCHIATRIC: No depression, anxiety, mood swings, or difficulty sleeping  HEME/LYMPH: No easy bruising, or bleeding gums; no enlarged LN

## 2023-05-31 NOTE — ED ADULT NURSE NOTE - NSFALLHARMRISKINTERV_ED_ALL_ED
Assistance with ambulation/Communicate risk of Fall with Harm to all staff, patient, and family/Encourage patient to sit up slowly, dangle for a short time, stand at bedside before walking/Provide visual cue: red socks, yellow wristband, yellow gown, etc/Reinforce activity limits and safety measures with patient and family/Bed in lowest position, wheels locked, appropriate side rails in place/Call bell, personal items and telephone in reach/Instruct patient to call for assistance before getting out of bed/chair/stretcher/Non-slip footwear applied when patient is off stretcher/[place] to call system/Physically safe environment - no spills, clutter or unnecessary equipment/Purposeful Proactive Rounding/Room/bathroom lighting operational, light cord in reach

## 2023-05-31 NOTE — H&P ADULT - ASSESSMENT
58 yo m w pmh obesity, kenia, htn, hld, afib/flutter s/p dccv and recent ablation, cad, gerd, p/w n+v and dizziness, 2/2 presyncope, found to be in aflutter, admitted to medicine for further mgmt

## 2023-05-31 NOTE — ED PROVIDER NOTE - PHYSICAL EXAMINATION
CONSTITUTIONAL: Well appearing and in no apparent distress.  ENT: Airway patent, moist mucous membranes.   EYES: Pupils equal, round and reactive to light. EOMI. Conjunctiva normal appearing.   CARDIAC: Normal rate, irregular rhythm.  Heart sounds S1, S2.    RESPIRATORY: Breath sounds clear and equal bilaterally.   GASTROINTESTINAL: Abdomen soft, non-tender, not distended.  MUSCULOSKELETAL: Spine appears normal.  NEUROLOGICAL: Alert and oriented x3, no focal deficits, no motor or sensory deficits. 5/5 muscle strength throughout.  SKIN: Skin normal color, warm, dry and intact.   PSYCHIATRIC: Normal mood and affect.

## 2023-05-31 NOTE — ED ADULT NURSE NOTE - OBJECTIVE STATEMENT
Pt is a 56 yo M who was brought to the ED from work via EMS. pt is a  for the [employer] and stated he suddenly felt dizzy, nauseous and vomited x1 while at work today. Denies cp/palpitations/sob, no diaphoresis, no syncope. States he had an ablation 1 week ago on 5/26. A/O x3.

## 2023-05-31 NOTE — H&P ADULT - PROBLEM SELECTOR PLAN 3
cont home amlo 5 (recently decreased from 10 to 5 by outpatient cardiologist on 5/31)  hold home toprol (recently increased from 25 to 75 by outpatient cardiologist 5/31)

## 2023-05-31 NOTE — H&P ADULT - NSHPPHYSICALEXAM_GEN_ALL_CORE
T(C): 36.6 (06-01-23 @ 00:43), Max: 37 (05-31-23 @ 17:29)  HR: 75 (06-01-23 @ 00:43) (72 - 103)  BP: 131/86 (06-01-23 @ 00:43) (120/96 - 139/92)  RR: 18 (06-01-23 @ 00:43) (16 - 120)  SpO2: 94% (06-01-23 @ 00:43) (92% - 97%)  GENERAL: NAD, lying in bed comfortably  EYES: EOMI, PERRLA; conjunctiva and sclera clear  ENMT: Moist oral mucosa, no pharyngeal injection or exudates   NECK: Supple, no palpable masses; no JVD  RESPIRATORY: Normal respiratory effort; lungs are clear to auscultation bilaterally  CARDIOVASCULAR: Regular rate and rhythm, normal S1 and S2, no murmur/rub/gallop; No lower extremity edema; Peripheral pulses are 2+ bilaterally  ABDOMEN: Nontender to palpation, normoactive bowel sounds, no rebound/guarding   MUSCULOSKELETAL: no joint swelling or tenderness to palpation  PSYCH: A+O to person, place, and time; affect appropriate  NEUROLOGY: CN 2-12 are intact and symmetric; no gross motor or sensory deficits   SKIN: No rashes; no palpable lesions

## 2023-05-31 NOTE — ED PROVIDER NOTE - OBJECTIVE STATEMENT
58 yo M with a PMH of CAD, HTN, HLD, Afib on Eliquis sp ablation on 05/26 by Dr. Meza p/w nausea, vomiting and lightheadedness x today. Pt went to work, works at [employer], acutely felt nauseous and lightheaded. Told his coworker who is at bedside with him who states he caught pt before he hit the floor. Sat pt in a chair and then he vomited x 1. Coworker states pt was very pale and diaphoretic when he approached him. No chest pain. Pt now asymptomatic, states he feels much better but was concerned about his sxs earlier. Went to Cards today, was seen by Dr. Lisker who recommended pt come to ED for eval. Denies fever, chills, SOB, cough, palpitations, abd pain, diarrhea, headache, dizziness, changes in speech/va. details… detailed exam

## 2023-05-31 NOTE — PHYSICAL EXAM
[Well Developed] : well developed [Well Nourished] : well nourished [Normal Conjunctiva] : normal conjunctiva [Normal Venous Pressure] : normal venous pressure [No Murmur] : no murmur [Clear Lung Fields] : clear lung fields [Soft] : abdomen soft [Normal Gait] : normal gait [No Edema] : no edema [No Rash] : no rash [Moves all extremities] : moves all extremities [Alert and Oriented] : alert and oriented [Normal memory] : normal memory [de-identified] : Irregularly irregular heartbeat. [de-identified] : Anxious appearing

## 2023-05-31 NOTE — ED PROVIDER NOTE - PROGRESS NOTE DETAILS
Cards aware, will come eval.  Marilu Fraser PA-C TREVOR Villafuerte (PGY-3) -discussed with cardiology.  Recommended 80 mg of sotalol now and tomorrow 80 mg twice daily.  Plan for n.p.o. at midnight for cardioversion tomorrow.  Admit to telemetry.

## 2023-05-31 NOTE — CARDIOLOGY SUMMARY
[de-identified] : March 20, 2023: There is a 25% distal left main stenosis.  The proximal LAD had a 60% stenosis (iFR was performed and deemed to be not obstructed blood flow) second diagonal had a 30% lesion.\par Ostial circumflex had a 70% stenosis IFR was deemed to be nonobstructive a flow.\par Right coronary artery had a proximal 30% stenosis, a mid 40% stenosis and the first RPL had a 50% stenosis.

## 2023-05-31 NOTE — CONSULT NOTE ADULT - SUBJECTIVE AND OBJECTIVE BOX
CARDIOLOGY FELLOW CONSULT NOTE    Consulting service:  Reason for consult:    HPI: 56 yo M with PMH of AFib s/p DCCV and recent AFib ablation (PVI, CTI ablation with bidirectional block) by Dr. Meza (5/26/23) on AC and MTP Succ, HTN, non-obstructive CAD, HTN, HLD and GERD who presents with dizziness and N/V. EP consulted for input.    Patient states that he has been feeling well since his AFib ablation last week with Dr. Meza and had no issues until today. He works at [employer] and states he was sitting down at work and when he stood up he became acutely dizzy, nauseous with 1 episode of NBNB vomiting, and presyncopal sensation. He never had any LOC but felt very ill at which point his coworkers called 911. Patient states he had otherwise been feeling fine since DC. He has been compliant with his AC and metoprolol. He saw his cardiologist Dr. Lisker in the office today prior to this event and was doing fine with plans for routine follow up. He denies any diarrhea or other illnesses, he has had good PO intake.    EKG AFlutter 2:1, HR 94    PMH:   Chronic GERD  Mixed hyperlipidemia  HTN (hypertension)  A-fib  Abdominal mass    PSH:   S/P arthroscopy  History of shoulder surgery  History of knee surgery    FAMILY HISTORY:  Family history of heart attack (Father)    Allergies:  No Known Allergies    Home Meds:    Current Medications:   apixaban 5 milliGRAM(s) Oral Once  sotalol. 80 milliGRAM(s) Oral Once    REVIEW OF SYSTEMS: nbAll other review of systems is negative unless indicated above.    Physical Exam:  T(F): 98.6 (05-31), Max: 98.6 (05-31)  HR: 72 (05-31) (72 - 103)  BP: 139/92 (05-31) (120/96 - 139/92)  RR: 18 (05-31)  SpO2: 97% (05-31)  GENERAL: No acute distress, well-developed  HEAD:  Atraumatic, Normocephalic  ENT: EOMI, PERRLA, conjunctiva and sclera clear, Neck supple, No JVD, moist mucosa  CHEST/LUNG: Clear to auscultation bilaterally; No wheeze, equal breath sounds bilaterally   BACK: No spinal tenderness  HEART: Regular rate and rhythm; No murmurs, rubs, or gallops  ABDOMEN: Soft, Nontender, Nondistended; Bowel sounds present  EXTREMITIES:  No clubbing, cyanosis, or edema  PSYCH: Nl behavior, nl affect  NEUROLOGY: AAOx3, non-focal, cranial nerves intact  SKIN: Normal color, No rashes or lesions  LINES:    ECG: Personally reviewed    Labs: Personally reviewed                        15.7   10.08 )-----------( 220      ( 31 May 2023 18:43 )             45.3     05-31    140  |  105  |  22  ----------------------------<  105<H>  3.9   |  20<L>  |  0.96    Ca    9.6      31 May 2023 20:16  Phos  3.9     05-31  Mg     2.1     05-31    TPro  7.3  /  Alb  4.4  /  TBili  1.2  /  DBili  x   /  AST  39  /  ALT  77<H>  /  AlkPhos  109  05-31    CARDIAC MARKERS ( 31 May 2023 18:43 )  96 ng/L / x     / x     / x     / x     / 2.5 ng/mL    A/P  56 yo M with PMH of AFib s/p DCCV and recent AFib ablation (PVI, CTI ablation with bidirectional block) by Dr. Meza (5/26/23) on AC and MTP Succ, HTN, non-obstructive CAD, HTN, HLD and GERD who presents with dizziness and N/V. EP consulted for input.    #AFib/AFlutter s/p ablation  #Dizziness  #N/V  - Pt with hx of AFib s/p ablation 5/26 with Dr. Meza p/w N/V and presyncopal sx  - EKG AFlutter 2:1 HR 94  - Pt on Apixaban 5 BID and MTP 50 QDay at home  - Has had intermittent AFib/Flutter since his procedure  - Spoke to Dr. Meza from EP who recommended repeat cardioversion for possible sx relief, either chemical or electrical. No need to repeat CHRISTIANO as pt has been adherent with AC  Plan:  - F/U Orthostatics  - Sotalol 80 mg PO x1 now, 80 PO BID tomorrow    > Repeat EKG 4 hours after each dose to assess for QT prolongation  - No need for CHRISTIANO/TTE  - NPO after midnight for DCCV in the AM  - Cont home Apixaban 5 BID  - Hold home metoprolol  - Admit to tele       Signed by:  Surinder Kennedy PGY4  Cardiology Fellow    PHANI Meza

## 2023-05-31 NOTE — ED PROVIDER NOTE - NS ED ATTENDING STATEMENT MOD
This was a shared visit with the BESSY. I reviewed and verified the documentation and independently performed the documented:

## 2023-06-01 DIAGNOSIS — I48.19 OTHER PERSISTENT ATRIAL FIBRILLATION: ICD-10-CM

## 2023-06-01 DIAGNOSIS — K21.9 GASTRO-ESOPHAGEAL REFLUX DISEASE WITHOUT ESOPHAGITIS: ICD-10-CM

## 2023-06-01 DIAGNOSIS — R42 DIZZINESS AND GIDDINESS: ICD-10-CM

## 2023-06-01 DIAGNOSIS — R77.8 OTHER SPECIFIED ABNORMALITIES OF PLASMA PROTEINS: ICD-10-CM

## 2023-06-01 DIAGNOSIS — E78.5 HYPERLIPIDEMIA, UNSPECIFIED: ICD-10-CM

## 2023-06-01 DIAGNOSIS — I10 ESSENTIAL (PRIMARY) HYPERTENSION: ICD-10-CM

## 2023-06-01 LAB
A1C WITH ESTIMATED AVERAGE GLUCOSE RESULT: 6 % — HIGH (ref 4–5.6)
ALBUMIN SERPL ELPH-MCNC: 4 G/DL — SIGNIFICANT CHANGE UP (ref 3.3–5)
ALP SERPL-CCNC: 107 U/L — SIGNIFICANT CHANGE UP (ref 40–120)
ALT FLD-CCNC: 73 U/L — HIGH (ref 10–45)
ANION GAP SERPL CALC-SCNC: 14 MMOL/L — SIGNIFICANT CHANGE UP (ref 5–17)
APTT BLD: 30.3 SEC — SIGNIFICANT CHANGE UP (ref 27.5–35.5)
AST SERPL-CCNC: 39 U/L — SIGNIFICANT CHANGE UP (ref 10–40)
BASOPHILS # BLD AUTO: 0.03 K/UL — SIGNIFICANT CHANGE UP (ref 0–0.2)
BASOPHILS NFR BLD AUTO: 0.4 % — SIGNIFICANT CHANGE UP (ref 0–2)
BILIRUB SERPL-MCNC: 0.9 MG/DL — SIGNIFICANT CHANGE UP (ref 0.2–1.2)
BUN SERPL-MCNC: 20 MG/DL — SIGNIFICANT CHANGE UP (ref 7–23)
CALCIUM SERPL-MCNC: 9.3 MG/DL — SIGNIFICANT CHANGE UP (ref 8.4–10.5)
CHLORIDE SERPL-SCNC: 107 MMOL/L — SIGNIFICANT CHANGE UP (ref 96–108)
CHOLEST SERPL-MCNC: 133 MG/DL — SIGNIFICANT CHANGE UP
CO2 SERPL-SCNC: 20 MMOL/L — LOW (ref 22–31)
CREAT SERPL-MCNC: 0.87 MG/DL — SIGNIFICANT CHANGE UP (ref 0.5–1.3)
EGFR: 101 ML/MIN/1.73M2 — SIGNIFICANT CHANGE UP
EOSINOPHIL # BLD AUTO: 0.15 K/UL — SIGNIFICANT CHANGE UP (ref 0–0.5)
EOSINOPHIL NFR BLD AUTO: 1.9 % — SIGNIFICANT CHANGE UP (ref 0–6)
ESTIMATED AVERAGE GLUCOSE: 126 MG/DL — HIGH (ref 68–114)
GLUCOSE SERPL-MCNC: 110 MG/DL — HIGH (ref 70–99)
HCT VFR BLD CALC: 45.7 % — SIGNIFICANT CHANGE UP (ref 39–50)
HDLC SERPL-MCNC: 31 MG/DL — LOW
HGB BLD-MCNC: 15.7 G/DL — SIGNIFICANT CHANGE UP (ref 13–17)
IMM GRANULOCYTES NFR BLD AUTO: 0.7 % — SIGNIFICANT CHANGE UP (ref 0–0.9)
INR BLD: 1.2 RATIO — HIGH (ref 0.88–1.16)
LIPID PNL WITH DIRECT LDL SERPL: 64 MG/DL — SIGNIFICANT CHANGE UP
LYMPHOCYTES # BLD AUTO: 1.55 K/UL — SIGNIFICANT CHANGE UP (ref 1–3.3)
LYMPHOCYTES # BLD AUTO: 19.2 % — SIGNIFICANT CHANGE UP (ref 13–44)
MAGNESIUM SERPL-MCNC: 2.1 MG/DL — SIGNIFICANT CHANGE UP (ref 1.6–2.6)
MCHC RBC-ENTMCNC: 29.8 PG — SIGNIFICANT CHANGE UP (ref 27–34)
MCHC RBC-ENTMCNC: 34.4 GM/DL — SIGNIFICANT CHANGE UP (ref 32–36)
MCV RBC AUTO: 86.9 FL — SIGNIFICANT CHANGE UP (ref 80–100)
MONOCYTES # BLD AUTO: 0.75 K/UL — SIGNIFICANT CHANGE UP (ref 0–0.9)
MONOCYTES NFR BLD AUTO: 9.3 % — SIGNIFICANT CHANGE UP (ref 2–14)
NEUTROPHILS # BLD AUTO: 5.52 K/UL — SIGNIFICANT CHANGE UP (ref 1.8–7.4)
NEUTROPHILS NFR BLD AUTO: 68.5 % — SIGNIFICANT CHANGE UP (ref 43–77)
NON HDL CHOLESTEROL: 101 MG/DL — SIGNIFICANT CHANGE UP
NRBC # BLD: 0 /100 WBCS — SIGNIFICANT CHANGE UP (ref 0–0)
PHOSPHATE SERPL-MCNC: 3.6 MG/DL — SIGNIFICANT CHANGE UP (ref 2.5–4.5)
PLATELET # BLD AUTO: 213 K/UL — SIGNIFICANT CHANGE UP (ref 150–400)
POTASSIUM SERPL-MCNC: 3.7 MMOL/L — SIGNIFICANT CHANGE UP (ref 3.5–5.3)
POTASSIUM SERPL-SCNC: 3.7 MMOL/L — SIGNIFICANT CHANGE UP (ref 3.5–5.3)
PROT SERPL-MCNC: 6.5 G/DL — SIGNIFICANT CHANGE UP (ref 6–8.3)
PROTHROM AB SERPL-ACNC: 14 SEC — HIGH (ref 10.5–13.4)
RBC # BLD: 5.26 M/UL — SIGNIFICANT CHANGE UP (ref 4.2–5.8)
RBC # FLD: 13.2 % — SIGNIFICANT CHANGE UP (ref 10.3–14.5)
SODIUM SERPL-SCNC: 141 MMOL/L — SIGNIFICANT CHANGE UP (ref 135–145)
TRIGL SERPL-MCNC: 188 MG/DL — HIGH
TROPONIN T, HIGH SENSITIVITY RESULT: 108 NG/L — HIGH (ref 0–51)
TSH SERPL-MCNC: 1.3 UIU/ML — SIGNIFICANT CHANGE UP (ref 0.27–4.2)
WBC # BLD: 8.06 K/UL — SIGNIFICANT CHANGE UP (ref 3.8–10.5)
WBC # FLD AUTO: 8.06 K/UL — SIGNIFICANT CHANGE UP (ref 3.8–10.5)

## 2023-06-01 PROCEDURE — 99223 1ST HOSP IP/OBS HIGH 75: CPT

## 2023-06-01 PROCEDURE — 93010 ELECTROCARDIOGRAM REPORT: CPT

## 2023-06-01 RX ORDER — ACETAMINOPHEN 500 MG
650 TABLET ORAL EVERY 6 HOURS
Refills: 0 | Status: DISCONTINUED | OUTPATIENT
Start: 2023-06-01 | End: 2023-06-02

## 2023-06-01 RX ORDER — SOTALOL HCL 120 MG
80 TABLET ORAL EVERY 12 HOURS
Refills: 0 | Status: DISCONTINUED | OUTPATIENT
Start: 2023-06-01 | End: 2023-06-02

## 2023-06-01 RX ORDER — ATORVASTATIN CALCIUM 80 MG/1
80 TABLET, FILM COATED ORAL AT BEDTIME
Refills: 0 | Status: DISCONTINUED | OUTPATIENT
Start: 2023-06-01 | End: 2023-06-02

## 2023-06-01 RX ORDER — METOPROLOL TARTRATE 50 MG
75 TABLET ORAL DAILY
Refills: 0 | Status: DISCONTINUED | OUTPATIENT
Start: 2023-06-01 | End: 2023-06-01

## 2023-06-01 RX ORDER — ONDANSETRON 8 MG/1
4 TABLET, FILM COATED ORAL EVERY 8 HOURS
Refills: 0 | Status: DISCONTINUED | OUTPATIENT
Start: 2023-06-01 | End: 2023-06-01

## 2023-06-01 RX ORDER — AMLODIPINE BESYLATE 2.5 MG/1
5 TABLET ORAL DAILY
Refills: 0 | Status: DISCONTINUED | OUTPATIENT
Start: 2023-06-01 | End: 2023-06-02

## 2023-06-01 RX ORDER — CLOPIDOGREL BISULFATE 75 MG/1
75 TABLET, FILM COATED ORAL DAILY
Refills: 0 | Status: DISCONTINUED | OUTPATIENT
Start: 2023-06-01 | End: 2023-06-02

## 2023-06-01 RX ORDER — LANOLIN ALCOHOL/MO/W.PET/CERES
3 CREAM (GRAM) TOPICAL AT BEDTIME
Refills: 0 | Status: DISCONTINUED | OUTPATIENT
Start: 2023-06-01 | End: 2023-06-02

## 2023-06-01 RX ORDER — APIXABAN 2.5 MG/1
5 TABLET, FILM COATED ORAL EVERY 12 HOURS
Refills: 0 | Status: DISCONTINUED | OUTPATIENT
Start: 2023-06-01 | End: 2023-06-02

## 2023-06-01 RX ORDER — SOTALOL HCL 120 MG
80 TABLET ORAL EVERY 12 HOURS
Refills: 0 | Status: DISCONTINUED | OUTPATIENT
Start: 2023-06-01 | End: 2023-06-01

## 2023-06-01 RX ORDER — PANTOPRAZOLE SODIUM 20 MG/1
20 TABLET, DELAYED RELEASE ORAL
Refills: 0 | Status: DISCONTINUED | OUTPATIENT
Start: 2023-06-01 | End: 2023-06-02

## 2023-06-01 RX ORDER — CHLORHEXIDINE GLUCONATE 213 G/1000ML
1 SOLUTION TOPICAL
Refills: 0 | Status: DISCONTINUED | OUTPATIENT
Start: 2023-06-01 | End: 2023-06-02

## 2023-06-01 RX ORDER — COLCHICINE 0.6 MG
0.6 TABLET ORAL DAILY
Refills: 0 | Status: DISCONTINUED | OUTPATIENT
Start: 2023-06-01 | End: 2023-06-02

## 2023-06-01 RX ADMIN — Medication 80 MILLIGRAM(S): at 00:22

## 2023-06-01 RX ADMIN — APIXABAN 5 MILLIGRAM(S): 2.5 TABLET, FILM COATED ORAL at 00:21

## 2023-06-01 RX ADMIN — Medication 0.6 MILLIGRAM(S): at 11:15

## 2023-06-01 RX ADMIN — ATORVASTATIN CALCIUM 80 MILLIGRAM(S): 80 TABLET, FILM COATED ORAL at 22:04

## 2023-06-01 RX ADMIN — APIXABAN 5 MILLIGRAM(S): 2.5 TABLET, FILM COATED ORAL at 05:05

## 2023-06-01 RX ADMIN — Medication 80 MILLIGRAM(S): at 10:06

## 2023-06-01 RX ADMIN — PANTOPRAZOLE SODIUM 20 MILLIGRAM(S): 20 TABLET, DELAYED RELEASE ORAL at 05:05

## 2023-06-01 RX ADMIN — CLOPIDOGREL BISULFATE 75 MILLIGRAM(S): 75 TABLET, FILM COATED ORAL at 11:15

## 2023-06-01 RX ADMIN — Medication 80 MILLIGRAM(S): at 22:04

## 2023-06-01 RX ADMIN — APIXABAN 5 MILLIGRAM(S): 2.5 TABLET, FILM COATED ORAL at 17:09

## 2023-06-01 RX ADMIN — AMLODIPINE BESYLATE 5 MILLIGRAM(S): 2.5 TABLET ORAL at 05:04

## 2023-06-01 NOTE — PATIENT PROFILE ADULT - DO YOU FEEL UNSAFE AT HOME, WORK, OR SCHOOL?
CHECKED ON PATIENT. STABLE AND SLEEPING COMFORTABLY ON LEFT SIDE. RESPIRATIONS EVEN AND 
UNLABORED. NO APPARENT S/SX OF ACUTE RESPIRATORY DISTRESS. WHITE BOARD COMMUNICATION UPDATED 
FOR ROUNDS. ALL SAFETY MEASURES IN PLACE. CALL LIGHT WITHIN REACH. WILL CONTINUE TO MONITOR. no

## 2023-06-01 NOTE — PATIENT PROFILE ADULT - FUNCTIONAL ASSESSMENT - DAILY ACTIVITY SCORE.
Chief Complaints and History of Present Illnesses   Patient presents with     Follow Up For     Exposure keratoconjunctivitis, left     HPI    Affected eye(s):  Both   Symptoms:        Duration:  10 months   Frequency:  Constant       Do you have eye pain now?:  No      Comments:  Pt. States that he is doing well.  No change in VA BE.  No c/o comfort BE.  Nayla Kam COT 2:32 PM July 19, 2018                
24

## 2023-06-01 NOTE — PATIENT PROFILE ADULT - FALL HARM RISK - UNIVERSAL INTERVENTIONS
Bed in lowest position, wheels locked, appropriate side rails in place/Call bell, personal items and telephone in reach/Instruct patient to call for assistance before getting out of bed or chair/Non-slip footwear when patient is out of bed/[place] to call system/Physically safe environment - no spills, clutter or unnecessary equipment/Purposeful Proactive Rounding/Room/bathroom lighting operational, light cord in reach

## 2023-06-01 NOTE — PROGRESS NOTE ADULT - ASSESSMENT
57M with PMH HTN, Afib/Alutter s/p DCCC and recent ablation 5/26/23, CAD, HTN, AMY, HLD, obesity, GERD  p/w nausea, vomiting and dizziness likely 2/2 presyncope, found to be in aflutter, admitted to medicine for further mgmt; s/p sotalol with conversion to sinus, DCCV now being deferred.

## 2023-06-01 NOTE — PROGRESS NOTE ADULT - ASSESSMENT
58 y/o man with AF s/p DCCV and recent AFib ablation (PVI, CTI ablation with bidirectional block) by Dr. Meza (5/26/23) on AC and MTP Succ, HTN, non-obstructive CAD, HTN, HLD and GERD who presents with dizziness and N/V. Upon admission he was found to be in atrial flutter.    1. AF s/p AF RFA (5/25/23)  2. Atrial flutter    - Atrial flutter 90s on admit, now converted to SR 60-80s  - Continue Eliquis 5 mg Q12H  - Continue Sotalol 80 mg Q12H - EKG 2 hrs after each dose  - No need for DCCV now that in SR  - Close telemetry monitoring  - Maintain K>4.0 and Mg>2.0   - Discussed with team

## 2023-06-02 ENCOUNTER — TRANSCRIPTION ENCOUNTER (OUTPATIENT)
Age: 58
End: 2023-06-02

## 2023-06-02 VITALS
DIASTOLIC BLOOD PRESSURE: 75 MMHG | HEART RATE: 70 BPM | RESPIRATION RATE: 18 BRPM | SYSTOLIC BLOOD PRESSURE: 129 MMHG | OXYGEN SATURATION: 97 % | TEMPERATURE: 98 F

## 2023-06-02 LAB
ALBUMIN SERPL ELPH-MCNC: 4.2 G/DL — SIGNIFICANT CHANGE UP (ref 3.3–5)
ALP SERPL-CCNC: 119 U/L — SIGNIFICANT CHANGE UP (ref 40–120)
ALT FLD-CCNC: 71 U/L — HIGH (ref 10–45)
ANION GAP SERPL CALC-SCNC: 16 MMOL/L — SIGNIFICANT CHANGE UP (ref 5–17)
AST SERPL-CCNC: 36 U/L — SIGNIFICANT CHANGE UP (ref 10–40)
BILIRUB SERPL-MCNC: 0.6 MG/DL — SIGNIFICANT CHANGE UP (ref 0.2–1.2)
BUN SERPL-MCNC: 19 MG/DL — SIGNIFICANT CHANGE UP (ref 7–23)
CALCIUM SERPL-MCNC: 9.1 MG/DL — SIGNIFICANT CHANGE UP (ref 8.4–10.5)
CHLORIDE SERPL-SCNC: 105 MMOL/L — SIGNIFICANT CHANGE UP (ref 96–108)
CO2 SERPL-SCNC: 21 MMOL/L — LOW (ref 22–31)
CREAT SERPL-MCNC: 0.98 MG/DL — SIGNIFICANT CHANGE UP (ref 0.5–1.3)
EGFR: 90 ML/MIN/1.73M2 — SIGNIFICANT CHANGE UP
GLUCOSE SERPL-MCNC: 147 MG/DL — HIGH (ref 70–99)
HCT VFR BLD CALC: 43.5 % — SIGNIFICANT CHANGE UP (ref 39–50)
HGB BLD-MCNC: 15.1 G/DL — SIGNIFICANT CHANGE UP (ref 13–17)
MCHC RBC-ENTMCNC: 30.3 PG — SIGNIFICANT CHANGE UP (ref 27–34)
MCHC RBC-ENTMCNC: 34.7 GM/DL — SIGNIFICANT CHANGE UP (ref 32–36)
MCV RBC AUTO: 87.3 FL — SIGNIFICANT CHANGE UP (ref 80–100)
MRSA PCR RESULT.: SIGNIFICANT CHANGE UP
NRBC # BLD: 0 /100 WBCS — SIGNIFICANT CHANGE UP (ref 0–0)
PLATELET # BLD AUTO: 192 K/UL — SIGNIFICANT CHANGE UP (ref 150–400)
POTASSIUM SERPL-MCNC: 3.3 MMOL/L — LOW (ref 3.5–5.3)
POTASSIUM SERPL-SCNC: 3.3 MMOL/L — LOW (ref 3.5–5.3)
PROT SERPL-MCNC: 6.7 G/DL — SIGNIFICANT CHANGE UP (ref 6–8.3)
RBC # BLD: 4.98 M/UL — SIGNIFICANT CHANGE UP (ref 4.2–5.8)
RBC # FLD: 13.2 % — SIGNIFICANT CHANGE UP (ref 10.3–14.5)
S AUREUS DNA NOSE QL NAA+PROBE: DETECTED
SODIUM SERPL-SCNC: 142 MMOL/L — SIGNIFICANT CHANGE UP (ref 135–145)
WBC # BLD: 7.73 K/UL — SIGNIFICANT CHANGE UP (ref 3.8–10.5)
WBC # FLD AUTO: 7.73 K/UL — SIGNIFICANT CHANGE UP (ref 3.8–10.5)

## 2023-06-02 PROCEDURE — 99285 EMERGENCY DEPT VISIT HI MDM: CPT

## 2023-06-02 PROCEDURE — 82330 ASSAY OF CALCIUM: CPT

## 2023-06-02 PROCEDURE — 82553 CREATINE MB FRACTION: CPT

## 2023-06-02 PROCEDURE — 85610 PROTHROMBIN TIME: CPT

## 2023-06-02 PROCEDURE — 71046 X-RAY EXAM CHEST 2 VIEWS: CPT

## 2023-06-02 PROCEDURE — 85014 HEMATOCRIT: CPT

## 2023-06-02 PROCEDURE — 87640 STAPH A DNA AMP PROBE: CPT

## 2023-06-02 PROCEDURE — 93005 ELECTROCARDIOGRAM TRACING: CPT

## 2023-06-02 PROCEDURE — 83036 HEMOGLOBIN GLYCOSYLATED A1C: CPT

## 2023-06-02 PROCEDURE — 93010 ELECTROCARDIOGRAM REPORT: CPT

## 2023-06-02 PROCEDURE — 80053 COMPREHEN METABOLIC PANEL: CPT

## 2023-06-02 PROCEDURE — 84484 ASSAY OF TROPONIN QUANT: CPT

## 2023-06-02 PROCEDURE — 85025 COMPLETE CBC W/AUTO DIFF WBC: CPT

## 2023-06-02 PROCEDURE — 85730 THROMBOPLASTIN TIME PARTIAL: CPT

## 2023-06-02 PROCEDURE — 36415 COLL VENOUS BLD VENIPUNCTURE: CPT

## 2023-06-02 PROCEDURE — 83735 ASSAY OF MAGNESIUM: CPT

## 2023-06-02 PROCEDURE — 87641 MR-STAPH DNA AMP PROBE: CPT

## 2023-06-02 PROCEDURE — 80061 LIPID PANEL: CPT

## 2023-06-02 PROCEDURE — 82435 ASSAY OF BLOOD CHLORIDE: CPT

## 2023-06-02 PROCEDURE — 84100 ASSAY OF PHOSPHORUS: CPT

## 2023-06-02 PROCEDURE — 84132 ASSAY OF SERUM POTASSIUM: CPT

## 2023-06-02 PROCEDURE — 85027 COMPLETE CBC AUTOMATED: CPT

## 2023-06-02 PROCEDURE — 82565 ASSAY OF CREATININE: CPT

## 2023-06-02 PROCEDURE — 82803 BLOOD GASES ANY COMBINATION: CPT

## 2023-06-02 PROCEDURE — 99239 HOSP IP/OBS DSCHRG MGMT >30: CPT

## 2023-06-02 PROCEDURE — 82947 ASSAY GLUCOSE BLOOD QUANT: CPT

## 2023-06-02 PROCEDURE — 83605 ASSAY OF LACTIC ACID: CPT

## 2023-06-02 PROCEDURE — 84443 ASSAY THYROID STIM HORMONE: CPT

## 2023-06-02 PROCEDURE — 85018 HEMOGLOBIN: CPT

## 2023-06-02 PROCEDURE — 84295 ASSAY OF SERUM SODIUM: CPT

## 2023-06-02 RX ORDER — SOTALOL HCL 120 MG
1 TABLET ORAL
Qty: 60 | Refills: 0
Start: 2023-06-02 | End: 2023-07-01

## 2023-06-02 RX ORDER — POTASSIUM CHLORIDE 20 MEQ
40 PACKET (EA) ORAL ONCE
Refills: 0 | Status: COMPLETED | OUTPATIENT
Start: 2023-06-02 | End: 2023-06-02

## 2023-06-02 RX ADMIN — Medication 0.6 MILLIGRAM(S): at 11:34

## 2023-06-02 RX ADMIN — Medication 80 MILLIGRAM(S): at 09:24

## 2023-06-02 RX ADMIN — AMLODIPINE BESYLATE 5 MILLIGRAM(S): 2.5 TABLET ORAL at 05:31

## 2023-06-02 RX ADMIN — CHLORHEXIDINE GLUCONATE 1 APPLICATION(S): 213 SOLUTION TOPICAL at 05:32

## 2023-06-02 RX ADMIN — CLOPIDOGREL BISULFATE 75 MILLIGRAM(S): 75 TABLET, FILM COATED ORAL at 11:34

## 2023-06-02 RX ADMIN — APIXABAN 5 MILLIGRAM(S): 2.5 TABLET, FILM COATED ORAL at 05:31

## 2023-06-02 RX ADMIN — PANTOPRAZOLE SODIUM 20 MILLIGRAM(S): 20 TABLET, DELAYED RELEASE ORAL at 05:31

## 2023-06-02 RX ADMIN — Medication 40 MILLIEQUIVALENT(S): at 10:23

## 2023-06-02 NOTE — DIETITIAN INITIAL EVALUATION ADULT - OTHER CALCULATIONS
-- Defer fluid needs to medical team  -- Estimated calorie/protein needs are based on current weight of 96.9 kg/ 213.6 pounds (06-02)

## 2023-06-02 NOTE — DISCHARGE NOTE PROVIDER - CARE PROVIDER_API CALL
Sharon Landa Ly  Internal Medicine  [address], [address]  [place] [address]  Phone: [phone]  Fax: [phone]  Follow Up Time: 1 week    Moy Meza.  Cardiac Electrophysiology  [address] [place] [address]  Phone: [phone]  Fax: [phone]  Follow Up Time: 1 week

## 2023-06-02 NOTE — DIETITIAN INITIAL EVALUATION ADULT - PROBLEM SELECTOR PLAN 3
ANTICOAGULATION MANAGEMENT     Leonela Christianson 72 year old female is on warfarin with therapeutic INR result. (Goal INR 2.0-3.0)    Recent labs: (last 7 days)     09/23/21  0758   INR 2.6*       ASSESSMENT     Source(s): Chart Review, Patient/Caregiver Call and Template       Warfarin doses taken: Warfarin taken as instructed    Diet: No new diet changes identified    New illness, injury, or hospitalization: No    Medication/supplement changes: None noted    Signs or symptoms of bleeding or clotting: No    Previous INR: Therapeutic last 2(+) visits    Additional findings: Changing responsible pool to [facility] as pt is followed by PCP Miri Woodruff MD     PLAN     Recommended plan for no diet, medication or health factor changes affecting INR     Dosing Instructions: Continue your current warfarin dose with next INR in 5 weeks       Summary  As of 9/23/2021    Full warfarin instructions:  6 mg every Tue, Fri; 3 mg all other days   Next INR check:  10/28/2021             Telephone call with Leonela who verbalizes understanding and agrees to plan    Patient elected to schedule next visit 10/28/21    Education provided: Goal range and significance of current result    Plan made per [facility] anticoagulation protocol    Isela Martines, RN  Anticoagulation Clinic  9/23/2021    _______________________________________________________________________     Anticoagulation Episode Summary     Current INR goal:  2.0-3.0   TTR:  90.0 % (1 y)   Target end date:     Send INR reminders to:  [facility] \[facility] [place] [facility]\""    Indications    History of pulmonary embolism [Z86.711]           Comments:           Anticoagulation Care Providers     Provider Role Specialty Phone number    Miri Woodruff MD Referring Family Medicine [phone]          
cont home amlo 5 (recently decreased from 10 to 5 by outpatient cardiologist on 5/31)  hold home toprol (recently increased from 25 to 75 by outpatient cardiologist 5/31)

## 2023-06-02 NOTE — DISCHARGE NOTE PROVIDER - NSDCCPCAREPLAN_GEN_ALL_CORE_FT
PRINCIPAL DISCHARGE DIAGNOSIS  Diagnosis: Atrial fibrillation and flutter  Assessment and Plan of Treatment: h/o afib/flutter s/p dccv and recent ablation with ?intermittent AFib/Flutter since his procedure, p/w presyncope with n/v, found to be in Afib on admission   You were seen by EP and started on sotalol- you are to continue this medication and follow up with EP upin discharge  - no need for repeat TTE as he had one performed recently   - TSH normal, orthostatic negative   - NO METOPROLOL on discharge as per EP  Continue Eliquis  Atrial fibrillation is a common heart rhythm problem which increases the risk of stroke and heat attack.  It helps if you control your blood pressure, avoid alcohol, cut down on caffeine, get treatment for your thyroid if it is overactive, and perform moderate exercise in consultation with your Primary Care Provider.  Call your doctor if you experience chest tightness/pain, lightheadedness, loss of consciousness, shortness of breath (especially with exercise), feel your heart racing or beating unusually, frequent or abnormal bleeding.  It is important to take all your heart medications as prescribed.        SECONDARY DISCHARGE DIAGNOSES  Diagnosis: HLD (hyperlipidemia)  Assessment and Plan of Treatment: Low salt, low fat, low cholesterol, diabetic diet if appropriate  Continue medication as prescribed  Exercise, increase your activity level  Pt. verbalized an understanding of all instructions.      Diagnosis: GERD (gastroesophageal reflux disease)  Assessment and Plan of Treatment: HOME CARE INSTRUCTIONS  Change the factors that you can control. Ask your caregiver for guidance concerning weight loss, quitting smoking, and alcohol consumption.  Avoid foods and drinks that make your symptoms worse, such as:   Caffeine or alcoholic drinks.   Chocolate.   Peppermint or mint flavorings.  Garlic and onions.  Spicy foods.   Citrus fruits, such as oranges, alek, or limes.   Tomato-based foods such as sauce, chili, salsa, and pizza.  Fried and fatty foods.  Avoid lying down for the 3 hours prior to your bedtime or prior to taking a nap.  Eat small, frequent meals instead of large meals.   Wear loose-fitting clothing. Do not wear anything tight around your waist that causes pressure on your stomach.  Raise the head of your bed 6 to 8 inches with wood blocks to help you sleep. Extra pillows will not help.  Only take over-the-counter or prescription medicines for pain, discomfort, or fever as directed by your caregiver.   Do not take aspirin, ibuprofen, or other nonsteroidal anti-inflammatory drugs (NSAIDs).  SEEK IMMEDIATE MEDICAL CARE IF:  You have pain in your arms, neck, jaw, teeth, or back.   Your pain increases or changes in intensity or duration.   You develop nausea, vomiting, or sweating (diaphoresis).  You develop shortness of breath, or you faint.  Your vomit is green, yellow, black, or looks like coffee grounds or blood.  Your stool is red, bloody, or black.  These symptoms could be signs of other problems, such as heart disease, gastric bleeding, or esophageal bleeding.      Diagnosis: HTN (hypertension)  Assessment and Plan of Treatment: Continue to follow a low salt/sodium diet.  Perform physical activities as tolerated in consultation with your Primary Care Provider and physical therapist.  Take all medications as prescribed.  Follow up with your medical doctor for routine blood pressure monitoring at your next visit.  Notify your doctor if you have any of the following symptoms:  Dizziness, lightheadedness, blurry vision, headache, chest pain, or shortness of breath.

## 2023-06-02 NOTE — PROGRESS NOTE ADULT - PROBLEM SELECTOR PLAN 1
h/o afib/flutter s/p dccv and recent ablation with ?intermittent AFib/Flutter since his procedure, p/w presyncope with n/v, found to be in Afib on admission   - EKG on admission with aflutter 2:1 conduction w hr ~100  - EP following, started on sotalol with conversion to sinus - no need for DCCV   - QTC thus far stable, will check QTC this afternoon after AM dose - if remains stable, plan to d/c on sotalol 80mg BID with plan for outpt EP/cardio follow up   - no need for repeat TTE as he had one performed recently   - TSH normal, orthostatic negative   - c/t monitor telemetry  - c/w eliquis  - will d/c home BB on discharge
h/o afib/flutter s/p dccv and recent ablation with ?intermittent AFib/Flutter since his procedure, p/w presyncope with n/v, found to be in Afib on admission   - EKG on admission with aflutter 2:1 conduction w hr ~100  - EP following, started on sotalol with conversion to sinus - DCCV deferred for now  - plan to c/w sotalol with EKGs 2hrs post dose to monitor WTC  - no need for repeat TTE as he had one performed recently   - TSH normal, orthostatic negative   - c/t monitor telemetry  - c/w eliquis  - holding home BB

## 2023-06-02 NOTE — DISCHARGE NOTE NURSING/CASE MANAGEMENT/SOCIAL WORK - NSDCVIVACCINE_GEN_ALL_CORE_FT
influenza, injectable, quadrivalent, preservative free; 09-Jan-2016 10:08; Haroldo Choi (RAKESH); [employer]; 2F5P4; IntraMuscular; Deltoid Left.; 0.5 milliLiter(s); VIS (VIS Published: 07-Aug-2015, VIS Presented: 09-Jan-2016);

## 2023-06-02 NOTE — DIETITIAN INITIAL EVALUATION ADULT - PERTINENT MEDS FT
MEDICATIONS  (STANDING):  amLODIPine   Tablet 5 milliGRAM(s) Oral daily  apixaban 5 milliGRAM(s) Oral every 12 hours  atorvastatin 80 milliGRAM(s) Oral at bedtime  chlorhexidine 2% Cloths 1 Application(s) Topical <User Schedule>  clopidogrel Tablet 75 milliGRAM(s) Oral daily  colchicine 0.6 milliGRAM(s) Oral daily  pantoprazole    Tablet 20 milliGRAM(s) Oral before breakfast  sotalol 80 milliGRAM(s) Oral every 12 hours    MEDICATIONS  (PRN):  acetaminophen     Tablet .. 650 milliGRAM(s) Oral every 6 hours PRN Temp greater or equal to 38C (100.4F), Mild Pain (1 - 3)  melatonin 3 milliGRAM(s) Oral at bedtime PRN Insomnia

## 2023-06-02 NOTE — PHARMACOTHERAPY INTERVENTION NOTE - COMMENTS
Counseled patient on discharge medication dose, indication, and possible side effects. Provided and reviewed medication fact sheet. Answered all of the patient's questions to the best of my ability. Patient exhibited understanding of discharge medication regimen.     Medication:  Sotalol 80 mg twice daily    Discussed with patient about discontinuing metoprolol succinate ER 25 mg 3 tabs once daily.

## 2023-06-02 NOTE — PROGRESS NOTE ADULT - ASSESSMENT
56 y/o man with AF s/p DCCV and recent AFib ablation (PVI, CTI ablation with bidirectional block) by Dr. Meza (5/26/23) on AC and MTP Succ, HTN, non-obstructive CAD, HTN, HLD and GERD who presents with dizziness and N/V. Upon admission he was found to be in atrial flutter.    1. AF s/p AF RFA (5/25/23)  2. Atrial flutter    - Atrial flutter 90s on admit, remains SR 60-80s  - Continue Eliquis 5 mg Q12H  - Continue Sotalol 80 mg Q12H - EKG 2 hrs after each dose (QTc 424 ms after last nights dose)  - If QTc remains stable after this morning dose can plan for discharge later today  - Close telemetry monitoring  - Maintain K>4.0 and Mg>2.0   - Discussed with team   56 y/o man with AF s/p DCCV and recent AFib ablation (PVI, CTI ablation with bidirectional block) by Dr. Mzea (5/26/23) on AC and MTP Succ, HTN, non-obstructive CAD, HTN, HLD and GERD who presents with dizziness and N/V. Upon admission he was found to be in atrial flutter.    1. AF s/p AF RFA (5/25/23)  2. Atrial flutter    - Atrial flutter 90s on admit, remains SR 60-80s  - Continue Eliquis 5 mg Q12H  - Continue Sotalol 80 mg Q12H - EKG 2 hrs after each dose (QTc 424 ms after last nights dose)  - If QTc remains stable after this morning dose can plan for discharge later today  - Close telemetry monitoring  - Maintain K>4.0 and Mg>2.0   - Discussed with team    Addendum:  - QTc 440 ms post 4th dose of Sotalol 80 mg   - Continue Sotalol 80 mg Q12H  - Continue Eliquis  - Hold further Metoprolol  - Patient can be discharged today from EP perspective  - Discussed with Dr. Meza and team

## 2023-06-02 NOTE — DIETITIAN INITIAL EVALUATION ADULT - REASON INDICATOR FOR ASSESSMENT
Consult for Assessment/Education  Information obtained from: Review of pt's medical record and interview with pt in his assigned room on [facility]

## 2023-06-02 NOTE — DIETITIAN INITIAL EVALUATION ADULT - EDUCATION DIETARY MODIFICATIONS
Provided education on heart healthy nutrition therapy. Recommended limited salt intake. Reviewed foods high in salt and amount of salt recommended per day. Discussed nutrition label reading. Stressed the importance of limited fried foods and saturated fat consumption./(1) partially meets; needs review/practice/verbalization

## 2023-06-02 NOTE — CHART NOTE - NSCHARTNOTEFT_GEN_A_CORE
NP Medicine Episodic     CC: Pt on Sotalol 80 mg BID for Afib w/ RVR adjusted by EPS.   QTC monitoring 2 hours post administering of medication       HPI: 57 y.o. M w/ pmh obesity, kenia, htn, hld, afib/flutter s/p dccv and recent ablation, cad, gerd, p/w n+v and dizziness. patient recently underwent afib ablation on 5/26; he had been feeling well since the procedure, until today. while at work today, after sitting for long period of time, he stood up and was overcome with dizziness a/w n+v; he felt as though he was about to faint, but denies actually losing consciousness. denies chest pain, palpitations, visual/auditory disturbances, jerking movements, urinary incontinence, tongue bites at the time of event. his coworkers grew concerned for him, so contacted ems to have patient bibems to [facility] er for further evaluation.    of note, patient saw his outpatient cardiologist earlier today, prior to this event, and was doing fine with plans to continue routine follow up; his home meds were adjusted so that his amlodipine dose decreased from 10 to 5 and his toprol dose increased from 25 to 75.      in er, suspect 2/2 presyncope, found to be in aflutter, admitted to medicine for further mgmt (31 May 2023 23:50)      Vital Signs Last 24 Hrs  T(C): 36.8 (02 Jun 2023 00:22), Max: 37.2 (01 Jun 2023 21:03)  T(F): 98.2 (02 Jun 2023 00:22), Max: 99 (01 Jun 2023 21:03)  HR: 94 (02 Jun 2023 00:22) (67 - 94)  BP: 120/78 (02 Jun 2023 00:22) (104/73 - 142/88)  BP(mean): --  RR: 18 (02 Jun 2023 00:22) (18 - 18)  SpO2: 94% (02 Jun 2023 00:22) (92% - 97%)    Parameters below as of 02 Jun 2023 00:22  Patient On (Oxygen Delivery Method): room air        Physical Exam:  General:  non-toxic,  NAD  Neurology: A&Ox3, normal speech, CN II-XII intact, nonfocal, BONILLA x 4  Head:  Normocephalic, atraumatic  Respiratory: CTA B/L  CV: RRR, S1S2, no murmur  Abdominal: Soft, NT, ND no palpable mass  MSK: No edema, + peripheral pulses, FROM all 4 extremity    Labs:                          15.7   8.06  )-----------( 213      ( 01 Jun 2023 07:12 )             45.7     06-01    141  |  107  |  20  ----------------------------<  110<H>  3.7   |  20<L>  |  0.87    Ca    9.3      01 Jun 2023 07:13  Phos  3.6     06-01  Mg     2.1     06-01    TPro  6.5  /  Alb  4.0  /  TBili  0.9  /  DBili  x   /  AST  39  /  ALT  73<H>  /  AlkPhos  107  06-01        Assessment & Plan:  57yoM PMH of AFib s/p DCCV and recent AFib ablation (PVI, CTI ablation with bidirectional block) by Dr. Meza (5/26/23) on AC and MTP Succ, HTN, non-obstructive CAD, HTN, HLD and GERD p/w dizziness and N/V. Pt admitted with symptomatic Aflutter. Pt now on Sotalol as per EPS with QTC monitoring. Pt planned for Cardioversion, but discontinued because pt converted to NSR on his own. Pt continued on Sotalol as per EP for arrhyhtmia     12 lead EKG done with NSR HR 66 with QTC of 413  Follow up with EPS in AM  May continue Sotalol 80 mg BID in AM with 2 hours post administering of med 12 [place]  [id number]

## 2023-06-02 NOTE — DIETITIAN INITIAL EVALUATION ADULT - ORAL INTAKE PTA/DIET HISTORY
Pt confirms no known food allergies/intolerances. Reports having a good appetite and PO intakes at home. Did not follow any specific dietary restrictions. Pt denies nausea, vomiting, diarrhea, or constipation. Denies difficulty chewing/swallowing. Denies any vitamin/mineral use at home, pt reports not taking oral nutritional supplement at home as well.

## 2023-06-02 NOTE — DISCHARGE NOTE PROVIDER - HOSPITAL COURSE
57M with PMH HTN, Afib/Alutter s/p DCCC and recent ablation 5/26/23, CAD, HTN, AMY, HLD, obesity, GERD  p/w nausea, vomiting and dizziness likely 2/2 presyncope, found to be in aflutter, admitted to medicine for further mgmt; s/p sotalol with conversion to sinus, DCCV now being deferred.      Other persistent atrial fibrillation.    h/o afib/flutter s/p dccv and recent ablation with ?intermittent AFib/Flutter since his procedure, p/w presyncope with n/v, found to be in Afib on admission   - EKG on admission with aflutter 2:1 conduction w hr ~100  - EP following, started on sotalol with conversion to sinus - no need for DCCV   - QTC thus far stable, will check QTC this afternoon after AM dose - if remains stable, plan to d/c on sotalol 80mg BID with plan for outpt EP/cardio follow up   - no need for repeat TTE as he had one performed recently   - TSH normal, orthostatic negative   - c/t monitor telemetry  - c/w eliquis  - will d/c home BB on discharge.    Elevated troponin. h/o CAD  EKG with no new ST seg - T wave changes suggestive of acute ischemia  Monitor for chest pain, telemetry/EKG changes  cont home colchicine, plavix, statin.    HTN (hypertension).   BP stable, at goal   - c/w home amlodipine 5mg daily (recently decreased from 10 to 5 by outpatient cardiologist on 5/31)  - holding home toprol (recently increased from 25 to 75 by outpatient cardiologist 5/31)- not to continue on DC as per EP     HLD (hyperlipidemia).  cont home statin.     GERD (gastroesophageal reflux disease). cont home ppi.    Patient medically cleared per Dr. Trevizo. D/C meds d/w attending. Hemodynamically stable for discharge today.

## 2023-06-02 NOTE — PROGRESS NOTE ADULT - NSPROGADDITIONALINFOA_GEN_ALL_CORE
pt stable for discharge home after afternoon QTC check; discharge on sotalol 80mg BID, will discontinue home metoprolol     42 minutes spent coordinating discharge

## 2023-06-02 NOTE — DISCHARGE NOTE NURSING/CASE MANAGEMENT/SOCIAL WORK - NSDCFUADDAPPT_GEN_ALL_CORE_FT
APPTS ARE READY TO BE MADE: [X] YES    Best Family or Patient Contact (if needed):    Additional Information about above appointments (if needed):    1: PCP  2: EP   3:     Other comments or requests:

## 2023-06-02 NOTE — DIETITIAN INITIAL EVALUATION ADULT - ADD RECOMMEND
1. Continue DASH/TLC diet  2.  Reinforced provided diet education as needed prior to discharge.   3. Monitor PO intake, GI tolerance, skin integrity and labs. RD remains available if needed, pt is aware.

## 2023-06-02 NOTE — PROGRESS NOTE ADULT - PROBLEM SELECTOR PLAN 2
h/o CAD  EKG with no new ST seg - T wave changes suggestive of acute ischemia  trop 96, f/u repeat   Monitor for chest pain, telemetry/EKG changes  cont home colchicine, plavix, statin
h/o CAD  EKG with no new ST seg - T wave changes suggestive of acute ischemia  Monitor for chest pain, telemetry/EKG changes  cont home colchicine, plavix, statin

## 2023-06-02 NOTE — DISCHARGE NOTE PROVIDER - NSDCFUADDAPPT_GEN_ALL_CORE_FT
APPTS ARE READY TO BE MADE: [X] YES    Best Family or Patient Contact (if needed):    Additional Information about above appointments (if needed):    1: PCP  2: EP   3:     Other comments or requests:    APPTS ARE READY TO BE MADE: [X] YES    Best Family or Patient Contact (if needed):    Additional Information about above appointments (if needed):    1: PCP  2: EP   3:     Other comments or requests:   Patient was scheduled with Dr. Moy Meza on 6/15 at 3pm at [address] [place] [address]. Patient was previously scheduled with Dr. Jay Lisker on 6/6 at 9:30am at [address] [place] [address]. Patient was provided with follow up request details and was advised to call to schedule follow up within specified time frame.

## 2023-06-02 NOTE — DISCHARGE NOTE PROVIDER - NSDCFUSCHEDAPPT_GEN_ALL_CORE_FT
Lisker, Jay J  [facility]  CARDIOLOGY [address]   Scheduled Appointment: 06/30/2023    Moy Meza  [facility]  ELECTROPH [address]  Scheduled Appointment: 07/25/2023     Lisker, Jay J  [facility]  CARDIOLOGY [address] [facility]   Scheduled Appointment: 06/06/2023    Moy Meza  [facility]  ELECTROPH [id number]  Scheduled Appointment: 06/15/2023    Lisker, Jay J  [facility]  CARDIOLOGY [address] [facility]   Scheduled Appointment: 06/30/2023    Moy Meza  [facility]  ELECTROPH [address]  Scheduled Appointment: 07/25/2023

## 2023-06-02 NOTE — DISCHARGE NOTE PROVIDER - PROVIDER TOKENS
OB PROVIDER:[TOKEN:[[id number]],FOLLOWUP:[1 week]],PROVIDER:[TOKEN:[[id number]],FOLLOWUP:[1 week]]

## 2023-06-02 NOTE — DISCHARGE NOTE PROVIDER - NSDCMRMEDTOKEN_GEN_ALL_CORE_FT
amLODIPine 5 mg oral tablet: 1 tab(s) orally once a day  apixaban 5 mg oral tablet: 1 tab(s) orally 2 times a day  atorvastatin 80 mg oral tablet: 1 orally  colchicine 0.6 mg oral tablet: 1 tab(s) orally once a day  pantoprazole 20 mg oral delayed release tablet: 1 tab(s) orally once a day  Plavix 75 mg oral tablet: 1 orally  sotalol 80 mg oral tablet: 1 tab(s) orally every 12 hours

## 2023-06-02 NOTE — DIETITIAN INITIAL EVALUATION ADULT - REASON FOR ADMISSION
Chart Reviewed, Events Noted  "57M with PMH HTN, Afib/Alutter s/p DCCC and recent ablation 5/26/23, CAD, HTN, AMY, HLD, obesity, GERD  p/w nausea, vomiting and dizziness likely 2/2 presyncope, found to be in aflutter, admitted to medicine for further mgmt; s/p sotalol with conversion to sinus, DCCV now being deferred."

## 2023-06-02 NOTE — PROGRESS NOTE ADULT - SUBJECTIVE AND OBJECTIVE BOX
24H hour events: No acute events    MEDICATIONS:  amLODIPine   Tablet 5 milliGRAM(s) Oral daily  apixaban 5 milliGRAM(s) Oral every 12 hours  clopidogrel Tablet 75 milliGRAM(s) Oral daily  sotalol 80 milliGRAM(s) Oral every 12 hours  acetaminophen     Tablet .. 650 milliGRAM(s) Oral every 6 hours PRN  melatonin 3 milliGRAM(s) Oral at bedtime PRN  pantoprazole    Tablet 20 milliGRAM(s) Oral before breakfast  atorvastatin 80 milliGRAM(s) Oral at bedtime  colchicine 0.6 milliGRAM(s) Oral daily  chlorhexidine 2% Cloths 1 Application(s) Topical <User Schedule>      REVIEW OF SYSTEMS:  Complete 12 point ROS negative.    PHYSICAL EXAM:  T(C): 36.5 (06-02-23 @ 04:31), Max: 37.2 (06-01-23 @ 21:03)  HR: 65 (06-02-23 @ 04:31) (65 - 94)  BP: 108/67 (06-02-23 @ 04:31) (104/73 - 142/88)  RR: 18 (06-02-23 @ 04:31) (18 - 18)  SpO2: 93% (06-02-23 @ 04:31) (93% - 97%)  Wt(kg): --  I&O's Summary    01 Jun 2023 07:01  -  02 Jun 2023 07:00  --------------------------------------------------------  IN: 100 mL / OUT: 0 mL / NET: 100 mL        Appearance: Normal	  HEENT:  PERRL, EOMI	  Cardiovascular: Normal S1 S2, No JVD, No murmurs, No edema  Respiratory: Lungs clear to auscultation	  Psychiatry: A & O x 3, Mood & affect appropriate  Gastrointestinal:  Soft, Non-tender, + BS	  Skin: No rashes, No ecchymoses, No cyanosis	  Neurologic: Non-focal  Extremities: No clubbing, cyanosis or edema  Vascular: Peripheral pulses palpable 2+ bilaterally        LABS:	 	    CBC Full  -  ( 02 Jun 2023 06:23 )  WBC Count : 7.73 K/uL  Hemoglobin : 15.1 g/dL  Hematocrit : 43.5 %  Platelet Count - Automated : 192 K/uL  Mean Cell Volume : 87.3 fl  Mean Cell Hemoglobin : 30.3 pg  Mean Cell Hemoglobin Concentration : 34.7 gm/dL  Auto Neutrophil # : x  Auto Lymphocyte # : x  Auto Monocyte # : x  Auto Eosinophil # : x  Auto Basophil # : x  Auto Neutrophil % : x  Auto Lymphocyte % : x  Auto Monocyte % : x  Auto Eosinophil % : x  Auto Basophil % : x    06-02    142  |  105  |  19  ----------------------------<  147<H>  3.3<L>   |  21<L>  |  0.98  06-01    141  |  107  |  20  ----------------------------<  110<H>  3.7   |  20<L>  |  0.87    Ca    9.1      02 Jun 2023 06:23  Ca    9.3      01 Jun 2023 07:13  Phos  3.6     06-01  Phos  3.9     05-31  Mg     2.1     06-01  Mg     2.1     05-31    TPro  6.7  /  Alb  4.2  /  TBili  0.6  /  DBili  x   /  AST  36  /  ALT  71<H>  /  AlkPhos  119  06-02  TPro  6.5  /  Alb  4.0  /  TBili  0.9  /  DBili  x   /  AST  39  /  ALT  73<H>  /  AlkPhos  107  06-01      TELEMETRY: SR 60-70s	      < from: CHRISTIANO W or WO Ultrasound Enhancing Agent (04.13.23 @ 12:02) >  Pt. Name:       JOSE ZHENG Study Date:    4/13/2023  MRN:            [id number]       YOB: 1965  Accession #:    [id number]        Age:           57 years  Account#:       [id number]     Gender:        M  Heart Rate:                      Height:        68.00 in (172.72 cm)  Rhythm:  Weight:        200.00 lb (90.72 kg)  Blood Pressure: 132/95 mmHg      BSA/BMI:       2.04 m² / 30.41 kg/m²  ________________________________________________________________________________________  Referring Physician:    [id number] Moy Meza  Interpreting Physician: Keyonna Loera MD  Primary Sonographer:    Ean Gandhi  Fellow (Performing):    Ean Gandhi  Fellow (Interpreting):  Ean Gandhi    CPT:               ECHO TRANSESOPH W/O CON - 34639.m; DOPPLER ECHO COMP W SPECT                - 08249.m; DOPPL ECHO COLOR FLOW - 25611.m; ECHO 3D                     RECONSTRUCT W WORKSTATION - 70155.m  Indication(s):     Unspecified atrial fibrillation - I48.91  Procedure:         Transesophageal echocardiogram performed with 2D, M-mode and                     complete spectral and color flow Doppler. Real time and full                     volume 3-dimensional imaging performed.  Ordering Location: [facility]    _______________________________________________________________________________________  CONCLUSIONS:      1. Moderately enlarged right ventricular cavity size and normal systolic function.   2. No thrombus seen in the left atrial, left atrial appendage, right atrial or right atrial appendage.   3. No prior echocardiogram is available for comparison.   4. The left atrial appendage emptying velocity is normal at 71 cm/s.    ____________________________________________________________________  CHRISTIANO Procedure:  Local oropharyngeal anesthetic was provided with viscouslidocaine. Study was performed with sedation - see anesthesia record. The adult Stephenie CHRISTIANO probe was introduced and passed into the esophagus. The CHRISTIANO probe was passed without difficulty. Images were obtained with the patient in a left lateral decubitus position. Image quality was good. The patient's vital signs; including heart rate, blood pressure, and oxygen saturation; remained stable throughout the procedure. The patient tolerated the procedure well and without complications.  ________________________________________________________________________________________  FINDINGS:  Left Ventricle:  The left ventricular systolic function is moderately decreased.     Right Ventricle:  Moderately enlarged right ventricular cavity size and normalsystolic function.     Left Atrium:  The left atrium is moderately dilated, with an indexed volume of 44 ml/m². There is no evidence of left atrial or left atrial appendage thrombus. The left atrial appendage emptying velocity is normal at 71 cm/s (normal >40cm/s).     Right Atrium:  The right atrium is mildly dilated with an indexed volume of 34.25 ml/m².     Interatrial Septum:  There is no evidence of a patent foramen ovale by color flow Doppler.     Aortic Valve:  There is trace aortic regurgitation.     Mitral Valve:  There is calcification of the mitral valve annulus. There is mild mitral regurgitation.     Tricuspid Valve:  There is mild tricuspid regurgitation.     Pulmonic Valve:  The pulmonary valve is normal in structure and function, with good leaflet excursion, and without any evidence of pulmonary stenosis or significant regurgitation.     Pericardium:  No pericardial effusion seen.  ____________________________________________________________________  QUANTITATIVE DATA  Aorta Measurements     Ao Sinus: 4.1 cm       Left Atrium Measurements                     Indexed to BSA  LA Vol BP: 90.0 ml 44 ml/m²    Right Atrial Measurements     RA Vol:       70.00 ml  RA Vol Index: 34.25 ml/m²    LVOT / RVOT/ Qp/Qs Data:     --------------------------------------------------------------------------------  [employer]  LV Analysis  EF: 43 %    < end of copied text >   	    
24H hour events: Converted to SR overnight    MEDICATIONS:  amLODIPine   Tablet 5 milliGRAM(s) Oral daily  apixaban 5 milliGRAM(s) Oral every 12 hours  clopidogrel Tablet 75 milliGRAM(s) Oral daily  sotalol 80 milliGRAM(s) Oral every 12 hours  acetaminophen     Tablet .. 650 milliGRAM(s) Oral every 6 hours PRN  melatonin 3 milliGRAM(s) Oral at bedtime PRN  ondansetron Injectable 4 milliGRAM(s) IV Push every 8 hours PRN  aluminum hydroxide/magnesium hydroxide/simethicone Suspension 30 milliLiter(s) Oral every 4 hours PRN  pantoprazole    Tablet 20 milliGRAM(s) Oral before breakfast  atorvastatin 80 milliGRAM(s) Oral at bedtime  colchicine 0.6 milliGRAM(s) Oral daily        REVIEW OF SYSTEMS:  Complete 12 point ROS negative.    PHYSICAL EXAM:  T(C): 36.6 (06-01-23 @ 08:36), Max: 37 (05-31-23 @ 17:29)  HR: 70 (06-01-23 @ 08:36) (70 - 103)  BP: 129/84 (06-01-23 @ 08:36) (116/75 - 139/92)  RR: 18 (06-01-23 @ 08:36) (16 - 120)  SpO2: 94% (06-01-23 @ 08:36) (92% - 97%)  Wt(kg): --  I&O's Summary      Appearance: Normal	  HEENT:  PERRL, EOMI	  Cardiovascular: Normal S1 S2, No JVD, No murmurs, No edema  Respiratory: Lungs clear to auscultation	  Psychiatry: A & O x 3, Mood & affect appropriate  Gastrointestinal:  Soft, Non-tender, + BS	  Skin: No rashes, No ecchymoses, No cyanosis	  Neurologic: Non-focal  Extremities: No clubbing, cyanosis or edema  Vascular: Peripheral pulses palpable 2+ bilaterally        LABS:	 	    CBC Full  -  ( 01 Jun 2023 07:12 )  WBC Count : 8.06 K/uL  Hemoglobin : 15.7 g/dL  Hematocrit : 45.7 %  Platelet Count - Automated : 213 K/uL  Mean Cell Volume : 86.9 fl  Mean Cell Hemoglobin : 29.8 pg  Mean Cell Hemoglobin Concentration : 34.4 gm/dL  Auto Neutrophil # : 5.52 K/uL  Auto Lymphocyte # : 1.55 K/uL  Auto Monocyte # : 0.75 K/uL  Auto Eosinophil # : 0.15 K/uL  Auto Basophil # : 0.03 K/uL  Auto Neutrophil % : 68.5 %  Auto Lymphocyte % : 19.2 %  Auto Monocyte % : 9.3 %  Auto Eosinophil % : 1.9 %  Auto Basophil % : 0.4 %    06-01    141  |  107  |  20  ----------------------------<  110<H>  3.7   |  20<L>  |  0.87  05-31    140  |  105  |  22  ----------------------------<  105<H>  3.9   |  20<L>  |  0.96    Ca    9.3      01 Jun 2023 07:13  Ca    9.6      31 May 2023 20:16  Phos  3.6     06-01  Phos  3.9     05-31  Mg     2.1     06-01  Mg     2.1     05-31    TPro  6.5  /  Alb  4.0  /  TBili  0.9  /  DBili  x   /  AST  39  /  ALT  73<H>  /  AlkPhos  107  06-01  TPro  7.3  /  Alb  4.4  /  TBili  1.2  /  DBili  x   /  AST  39  /  ALT  77<H>  /  AlkPhos  109  05-31    TELEMETRY: SR 60-80s	      < from: CHRISTIANO W or WO Ultrasound Enhancing Agent (04.13.23 @ 12:02) >  Pt. Name:       JOSE ZHENG Study Date:    4/13/2023  MRN:            [id number]       YOB: 1965  Accession #:    [id number]        Age:           57 years  Account#:       [id number]     Gender:        M  Heart Rate:                      Height:        68.00 in (172.72 cm)  Rhythm:  Weight:        200.00 lb (90.72 kg)  Blood Pressure: 132/95 mmHg      BSA/BMI:       2.04 m² / 30.41 kg/m²  ________________________________________________________________________________________  Referring Physician:    [id number] Moy Meza  Interpreting Physician: Keyonna Loera MD  Primary Sonographer:    Ean Gandhi  Fellow (Performing):    Ean Gandhi  Fellow (Interpreting):  Ean Gandhi    CPT:               ECHO TRANSESOPH W/O CON - 35445.m; DOPPLER ECHO COMP W SPECT                - 03969.m; DOPPL ECHO COLOR FLOW - 05767.m; ECHO 3D                     RECONSTRUCT W WORKSTATION - 41926.m  Indication(s):     Unspecified atrial fibrillation - I48.91  Procedure:         Transesophageal echocardiogram performed with 2D, M-mode and                     complete spectral and color flow Doppler. Real time and full                     volume 3-dimensional imaging performed.  Ordering Location: [facility]    _______________________________________________________________________________________  CONCLUSIONS:      1. Moderately enlarged right ventricular cavity size and normal systolic function.   2. No thrombus seen in the left atrial, left atrial appendage, right atrial or right atrial appendage.   3. No prior echocardiogram is available for comparison.   4. The left atrial appendage emptying velocity is normal at 71 cm/s.    ____________________________________________________________________  CHRISTIANO Procedure:  Local oropharyngeal anesthetic was provided with viscouslidocaine. Study was performed with sedation - see anesthesia record. The adult Stephenie CHRISTIANO probe was introduced and passed into the esophagus. The CHRISTIANO probe was passed without difficulty. Images were obtained with the patient in a left lateral decubitus position. Image quality was good. The patient's vital signs; including heart rate, blood pressure, and oxygen saturation; remained stable throughout the procedure. The patient tolerated the procedure well and without complications.  ________________________________________________________________________________________  FINDINGS:  Left Ventricle:  The left ventricular systolic function is moderately decreased.     Right Ventricle:  Moderately enlarged right ventricular cavity size and normalsystolic function.     Left Atrium:  The left atrium is moderately dilated, with an indexed volume of 44 ml/m². There is no evidence of left atrial or left atrial appendage thrombus. The left atrial appendage emptying velocity is normal at 71 cm/s (normal >40cm/s).     Right Atrium:  The right atrium is mildly dilated with an indexed volume of 34.25 ml/m².     Interatrial Septum:  There is no evidence of a patent foramen ovale by color flow Doppler.     Aortic Valve:  There is trace aortic regurgitation.     Mitral Valve:  There is calcification of the mitral valve annulus. There is mild mitral regurgitation.     Tricuspid Valve:  There is mild tricuspid regurgitation.     Pulmonic Valve:  The pulmonary valve is normal in structure and function, with good leaflet excursion, and without any evidence of pulmonary stenosis or significant regurgitation.     Pericardium:  No pericardial effusion seen.  ____________________________________________________________________  QUANTITATIVE DATA  Aorta Measurements     Ao Sinus: 4.1 cm       Left Atrium Measurements                     Indexed to BSA  LA Vol BP: 90.0 ml 44 ml/m²    Right Atrial Measurements     RA Vol:       70.00 ml  RA Vol Index: 34.25 ml/m²    LVOT / RVOT/ Qp/Qs Data:     --------------------------------------------------------------------------------  [employer]  LV Analysis  EF: 43 %    < end of copied text >  	    
[facility] Division of Hospital Medicine  Kizzy Trevizo MD  Available via MS Teams    SUBJECTIVE / OVERNIGHT EVENTS:  no acute events overnight   feeling well this AM - denies any CP, palpitations, no further dizziness/lightheadedness, no nausea/vomiting     ADDITIONAL REVIEW OF SYSTEMS:  14 point ROS negative except as noted above     MEDICATIONS  (STANDING):  amLODIPine   Tablet 5 milliGRAM(s) Oral daily  apixaban 5 milliGRAM(s) Oral every 12 hours  atorvastatin 80 milliGRAM(s) Oral at bedtime  chlorhexidine 2% Cloths 1 Application(s) Topical <User Schedule>  clopidogrel Tablet 75 milliGRAM(s) Oral daily  colchicine 0.6 milliGRAM(s) Oral daily  pantoprazole    Tablet 20 milliGRAM(s) Oral before breakfast  sotalol 80 milliGRAM(s) Oral every 12 hours    MEDICATIONS  (PRN):  acetaminophen     Tablet .. 650 milliGRAM(s) Oral every 6 hours PRN Temp greater or equal to 38C (100.4F), Mild Pain (1 - 3)  melatonin 3 milliGRAM(s) Oral at bedtime PRN Insomnia      I&O's Summary      PHYSICAL EXAM:  Vital Signs Last 24 Hrs  T(C): 36.6 (01 Jun 2023 08:36), Max: 37 (31 May 2023 17:29)  T(F): 97.9 (01 Jun 2023 08:36), Max: 98.6 (31 May 2023 17:29)  HR: 72 (01 Jun 2023 10:05) (70 - 103)  BP: 115/77 (01 Jun 2023 10:05) (115/77 - 139/92)  BP(mean): 101 (31 May 2023 19:33) (101 - 101)  RR: 18 (01 Jun 2023 08:36) (16 - 120)  SpO2: 94% (01 Jun 2023 08:36) (92% - 97%)    Parameters below as of 01 Jun 2023 08:36  Patient On (Oxygen Delivery Method): room air      PHYSICAL EXAM:  GENERAL: NAD, well-developed  HEAD:  Atraumatic, normocephalic  EYES: EOMI, conjunctiva and sclera clear  NECK: Supple, no JVD  CHEST/LUNG: Clear to auscultation bilaterally; no wheezing or rales  HEART: Regular rate and rhythm; no murmurs, no LE edema   ABDOMEN: Soft, nontender, nondistended; bowel sounds present  EXTREMITIES:  2+ Peripheral Pulses, no clubbing, cyanosis  PSYCH: AAOx3, calm affect, not anxious  SKIN: No rashes or lesions      LABS:                        15.7   8.06  )-----------( 213      ( 01 Jun 2023 07:12 )             45.7     06-01    141  |  107  |  20  ----------------------------<  110<H>  3.7   |  20<L>  |  0.87    Ca    9.3      01 Jun 2023 07:13  Phos  3.6     06-01  Mg     2.1     06-01    TPro  6.5  /  Alb  4.0  /  TBili  0.9  /  DBili  x   /  AST  39  /  ALT  73<H>  /  AlkPhos  107  06-01    PT/INR - ( 01 Jun 2023 07:15 )   PT: 14.0 sec;   INR: 1.20 ratio         PTT - ( 01 Jun 2023 07:15 )  PTT:30.3 sec  CARDIAC MARKERS ( 31 May 2023 18:43 )  x     / x     / x     / x     / 2.5 ng/mL              RADIOLOGY & ADDITIONAL TESTS:  New Imaging Personally Reviewed Today:  New Electrocardiogram Personally Reviewed Today:  Other Results Reviewed Today:   Prior or Outpatient Records Reviewed Today with Summary:    COORDINATION OF CARE:  Consultant Communication and Details of Discussion (where applicable):    
[facility] Division of Hospital Medicine  Kizzy Trevizo MD  Available via MS Teams      SUBJECTIVE / OVERNIGHT EVENTS:  no acute events overnight  feeling well, ambulating without symptoms, no CP, palpitations, no dizziness, no syncope, no n/v     ADDITIONAL REVIEW OF SYSTEMS:  14 point ROS negative except as noted above     MEDICATIONS  (STANDING):  amLODIPine   Tablet 5 milliGRAM(s) Oral daily  apixaban 5 milliGRAM(s) Oral every 12 hours  atorvastatin 80 milliGRAM(s) Oral at bedtime  chlorhexidine 2% Cloths 1 Application(s) Topical <User Schedule>  clopidogrel Tablet 75 milliGRAM(s) Oral daily  colchicine 0.6 milliGRAM(s) Oral daily  pantoprazole    Tablet 20 milliGRAM(s) Oral before breakfast  sotalol 80 milliGRAM(s) Oral every 12 hours    MEDICATIONS  (PRN):  acetaminophen     Tablet .. 650 milliGRAM(s) Oral every 6 hours PRN Temp greater or equal to 38C (100.4F), Mild Pain (1 - 3)  melatonin 3 milliGRAM(s) Oral at bedtime PRN Insomnia      I&O's Summary    01 Jun 2023 07:01  -  02 Jun 2023 07:00  --------------------------------------------------------  IN: 100 mL / OUT: 0 mL / NET: 100 mL        PHYSICAL EXAM:  Vital Signs Last 24 Hrs  T(C): 36.7 (02 Jun 2023 11:39), Max: 37.2 (01 Jun 2023 21:03)  T(F): 98 (02 Jun 2023 11:39), Max: 99 (01 Jun 2023 21:03)  HR: 63 (02 Jun 2023 11:39) (55 - 94)  BP: 134/80 (02 Jun 2023 11:39) (104/73 - 142/88)  BP(mean): --  RR: 18 (02 Jun 2023 11:39) (18 - 18)  SpO2: 95% (02 Jun 2023 11:39) (93% - 97%)    Parameters below as of 02 Jun 2023 11:39  Patient On (Oxygen Delivery Method): room air      PHYSICAL EXAM:  GENERAL: NAD, well-developed  HEAD:  Atraumatic, normocephalic  EYES: EOMI, conjunctiva and sclera clear  NECK: Supple, no JVD  CHEST/LUNG: Clear to auscultation bilaterally; no wheezing or rales  HEART: Regular rate and rhythm; no murmurs, no LE edema   ABDOMEN: Soft, nontender, nondistended; bowel sounds present  EXTREMITIES:  2+ Peripheral Pulses, no clubbing, cyanosis  PSYCH: AAOx3, calm affect, not anxious  SKIN: No rashes or lesions      LABS:                        15.1   7.73  )-----------( 192      ( 02 Jun 2023 06:23 )             43.5     06-02    142  |  105  |  19  ----------------------------<  147<H>  3.3<L>   |  21<L>  |  0.98    Ca    9.1      02 Jun 2023 06:23  Phos  3.6     06-01  Mg     2.1     06-01    TPro  6.7  /  Alb  4.2  /  TBili  0.6  /  DBili  x   /  AST  36  /  ALT  71<H>  /  AlkPhos  119  06-02    PT/INR - ( 01 Jun 2023 07:15 )   PT: 14.0 sec;   INR: 1.20 ratio         PTT - ( 01 Jun 2023 07:15 )  PTT:30.3 sec  CARDIAC MARKERS ( 31 May 2023 18:43 )  x     / x     / x     / x     / 2.5 ng/mL              RADIOLOGY & ADDITIONAL TESTS:  New Imaging Personally Reviewed Today:  New Electrocardiogram Personally Reviewed Today:  Other Results Reviewed Today:   Prior or Outpatient Records Reviewed Today with Summary:    COORDINATION OF CARE:  Consultant Communication and Details of Discussion (where applicable):

## 2023-06-02 NOTE — DIETITIAN INITIAL EVALUATION ADULT - PERTINENT LABORATORY DATA
06-02    142  |  105  |  19  ----------------------------<  147<H>  3.3<L>   |  21<L>  |  0.98    Ca    9.1      02 Jun 2023 06:23  Phos  3.6     06-01  Mg     2.1     06-01    TPro  6.7  /  Alb  4.2  /  TBili  0.6  /  DBili  x   /  AST  36  /  ALT  71<H>  /  AlkPhos  119  06-02  A1C with Estimated Average Glucose Result: 6.0 % (06-01-23 @ 07:12)

## 2023-06-02 NOTE — PROGRESS NOTE ADULT - PROBLEM SELECTOR PLAN 3
BP stable, at goal   - c/w home amlodipine 5mg daily (recently decreased from 10 to 5 by outpatient cardiologist on 5/31)  - holding home toprol (recently increased from 25 to 75 by outpatient cardiologist 5/31)
BP stable, at goal   - c/w home amlodipine 5mg daily (recently decreased from 10 to 5 by outpatient cardiologist on 5/31)  - holding home toprol (recently increased from 25 to 75 by outpatient cardiologist 5/31)

## 2023-06-02 NOTE — DISCHARGE NOTE NURSING/CASE MANAGEMENT/SOCIAL WORK - NSDCPEFALRISK_GEN_ALL_CORE
For information on Fall & Injury Prevention, visit: [email or web address] OR  [email or web address] OR  https://www.cdc.gov/steadi/patient.html

## 2023-06-02 NOTE — DIETITIAN INITIAL EVALUATION ADULT - OTHER INFO
Weight:  -- Drug Dosing Weight 96.2 kg/ 212.1 pounds(5/31/23)  -- Daily weights: 96.9 kg/ 213.6 pounds (06-02),  97.2 kg/ 214.3 pounds (06-01)  -- Pt reports his UBW is about 220 pounds, reports increasing his physical activity of as late to help promote weight loss  -- IBW: 154 pounds %IBW: 139%

## 2023-06-05 ENCOUNTER — TRANSCRIPTION ENCOUNTER (OUTPATIENT)
Age: 58
End: 2023-06-05

## 2023-06-06 ENCOUNTER — APPOINTMENT (OUTPATIENT)
Dept: CARDIOLOGY | Facility: CLINIC | Age: 58
End: 2023-06-06
Payer: COMMERCIAL

## 2023-06-06 ENCOUNTER — NON-APPOINTMENT (OUTPATIENT)
Age: 58
End: 2023-06-06

## 2023-06-06 VITALS
HEART RATE: 64 BPM | OXYGEN SATURATION: 96 % | SYSTOLIC BLOOD PRESSURE: 100 MMHG | BODY MASS INDEX: 33.43 KG/M2 | WEIGHT: 213 LBS | HEIGHT: 67 IN | DIASTOLIC BLOOD PRESSURE: 78 MMHG

## 2023-06-06 PROCEDURE — 93000 ELECTROCARDIOGRAM COMPLETE: CPT

## 2023-06-06 PROCEDURE — 99214 OFFICE O/P EST MOD 30 MIN: CPT | Mod: 25

## 2023-06-06 NOTE — CARDIOLOGY SUMMARY
[de-identified] : March 20, 2023: There is a 25% distal left main stenosis.  The proximal LAD had a 60% stenosis (iFR was performed and deemed to be not obstructed blood flow) second diagonal had a 30% lesion.\par Ostial circumflex had a 70% stenosis IFR was deemed to be nonobstructive a flow.\par Right coronary artery had a proximal 30% stenosis, a mid 40% stenosis and the first RPL had a 50% stenosis.

## 2023-06-06 NOTE — DISCUSSION/SUMMARY
[EKG obtained to assist in diagnosis and management of assessed problem(s)] : EKG obtained to assist in diagnosis and management of assessed problem(s) [FreeTextEntry1] : He is a 57-year-old with hypertension, GERD and episodes of atrial flutter/fibrillation.\par Now with diffuse coronary atherosclerosis without any definitive obstructive lesion.\par CAD: No anginal symptoms.  He is tolerating aggressive medical therapy with Plavix and colchicine.  No aspirin due to concomitant NOAC use.\par Continue high-dose statin with a goal LDL less than 70 mg/dL.\par \par A-fib: Now back in sinus rhythm s/p acute inflammatory episode post ablation. Now s/p Sotalol load. ECG is normal now. \par HTN: Continue norvasc 5 mg.\par \par Continue oral anticoagulation for stroke risk reduction.\par See Dr. Meza in 2 weeks. See me in 4 weeks.

## 2023-06-06 NOTE — HISTORY OF PRESENT ILLNESS
[FreeTextEntry1] : Had sudden diaphoresis and nausea. taken to the ER. Was in Afl with RVR. Started on Sotalol and monitored for 3 days.\par No further episodes.\par Feels okay overall.\par \par Prior:\par s/p AF ablation 5/26/2023.\par He feels okay. Taking toprol 50 qd.\par No chest pain or dyspnea.\par \par Prior:\par s/p CHRISTIANO cardioversion.\par Doing well. No bleeding or bruising on his current medical regimen.\par He denies any exertional chest pains or shortness of breath though he is not performing routine aerobic activity.  He is fairly active as a  at work.\par \par \par Prior:\par Coronary angio: Moderate diffuse disease. See report.\par NO angina.\par Wants to know\par \par Prior:\par Dear Sharon,\par Thank you for referring him for cardiovascular evaluation.  He is a 57-year-old with a history of paroxysmal atrial fibrillation and hypertension who presented to your office with sweating, jitteriness and was noted to be in atrial fibrillation with a rapid ventricular response.\par He reports that approximately 2 weeks ago his work time schedule was changed drastically and he was under severe stress.\par He started reporting feeling shaky and jittery as well as some shortness of breath.\par He had no chest pains, orthopnea, PND or leg edema.\par When he was seen in your office he was noted to be in atrial fibrillation with a rapid ventricular response.\par His previous atrial fibrillation episode occurred in the setting of the operating room and resolved with beta-blockade.\par He has a history of hypertension, GERD and an intestinal mass that is being monitored with annual MRI which has reportedly not grown for the past 2 years.\par He has a history of snoring.\par He has no history of coronary artery disease, diabetes mellitus, stroke, renal insufficiency or congestive heart failure.\par

## 2023-06-06 NOTE — PHYSICAL EXAM
[Well Developed] : well developed [Well Nourished] : well nourished [Normal Conjunctiva] : normal conjunctiva [Normal Venous Pressure] : normal venous pressure [No Murmur] : no murmur [Clear Lung Fields] : clear lung fields [Soft] : abdomen soft [Normal Gait] : normal gait [No Edema] : no edema [No Rash] : no rash [Moves all extremities] : moves all extremities [Alert and Oriented] : alert and oriented [Normal memory] : normal memory [de-identified] : Irregularly irregular heartbeat. [de-identified] : Anxious appearing

## 2023-06-08 ENCOUNTER — TRANSCRIPTION ENCOUNTER (OUTPATIENT)
Age: 58
End: 2023-06-08

## 2023-06-15 ENCOUNTER — APPOINTMENT (OUTPATIENT)
Dept: ELECTROPHYSIOLOGY | Facility: CLINIC | Age: 58
End: 2023-06-15

## 2023-07-21 ENCOUNTER — APPOINTMENT (OUTPATIENT)
Dept: INTERNAL MEDICINE | Facility: CLINIC | Age: 58
End: 2023-07-21
Payer: COMMERCIAL

## 2023-07-21 VITALS — DIASTOLIC BLOOD PRESSURE: 80 MMHG | SYSTOLIC BLOOD PRESSURE: 125 MMHG

## 2023-07-21 VITALS
BODY MASS INDEX: 33.74 KG/M2 | OXYGEN SATURATION: 96 % | DIASTOLIC BLOOD PRESSURE: 100 MMHG | HEIGHT: 67 IN | SYSTOLIC BLOOD PRESSURE: 155 MMHG | WEIGHT: 215 LBS | HEART RATE: 69 BPM

## 2023-07-21 DIAGNOSIS — Z00.00 ENCOUNTER FOR GENERAL ADULT MEDICAL EXAMINATION W/OUT ABNORMAL FINDINGS: ICD-10-CM

## 2023-07-21 DIAGNOSIS — R73.03 PREDIABETES.: ICD-10-CM

## 2023-07-21 PROCEDURE — 36415 COLL VENOUS BLD VENIPUNCTURE: CPT

## 2023-07-21 PROCEDURE — G0444 DEPRESSION SCREEN ANNUAL: CPT | Mod: 59

## 2023-07-21 PROCEDURE — 99396 PREV VISIT EST AGE 40-64: CPT | Mod: 25

## 2023-07-21 NOTE — HISTORY OF PRESENT ILLNESS
[FreeTextEntry1] : Patient presents for comprehensive medical evaluation today.  [de-identified] : Hx of HTN, GERD, HLD and afib (s/p CHRISTIANO guided DCCV 4/13/2023 now on sotalol)\par \par No recent episodes of afib since sotalol loading\par Feels well overall - no acute complaints\par Still undergoing stress surrounding his employment - may retire soon

## 2023-07-21 NOTE — ASSESSMENT
[FreeTextEntry1] : HCM\par \par Preventive medicine discussed - including importance of lifestyle modification - with incorporation of healthy diet + regular exercise\par \par CAD: \par Diffuse disease without obstruction as per cath 3/21/2023 \par On plavix and colchicine \par No aspirin due to concomitant NOAC use.\par Continue high-dose statin with a goal LDL less than 70 mg/dL\par follows with Dr. Jay Lisker\par \par A-fib:\par s/p CHRISTIANO guided DCCV 4/13/2023 with conversion to afib\par now s/p sotalol load with conversion to NSR\par c/w sotalol \par follows with Dr. Moy Meza

## 2023-07-21 NOTE — HEALTH RISK ASSESSMENT
[Good] : ~his/her~  mood as  good [Yes] : Yes [Monthly or less (1 pt)] : Monthly or less (1 point) [1 or 2 (0 pts)] : 1 or 2 (0 points) [Less than monthly (1 pt)] : Less than monthly (1 point) [No] : In the past 12 months have you used drugs other than those required for medical reasons? No [No falls in past year] : Patient reported no falls in the past year [0] : 2) Feeling down, depressed, or hopeless: Not at all (0) [PHQ-2 Negative - No further assessment needed] : PHQ-2 Negative - No further assessment needed [Patient reported colonoscopy was normal] : Patient reported colonoscopy was normal [HIV Test offered] : HIV Test offered [Hepatitis C test offered] : Hepatitis C test offered [With Family] : lives with family [Employed] : employed [] :  [# Of Children ___] : has [unfilled] children [Feels Safe at Home] : Feels safe at home [Fully functional (bathing, dressing, toileting, transferring, walking, feeding)] : Fully functional (bathing, dressing, toileting, transferring, walking, feeding) [Fully functional (using the telephone, shopping, preparing meals, housekeeping, doing laundry, using] : Fully functional and needs no help or supervision to perform IADLs (using the telephone, shopping, preparing meals, housekeeping, doing laundry, using transportation, managing medications and managing finances) [Never] : Never [de-identified] : walk, gym  [de-identified] : balanced varied diet without restrictions  [[id number]] : 0 [Language] : denies difficulty with language [Handling Complex Tasks] : denies difficulty handling complex tasks [High Risk Behavior] : no high risk behavior [Reports changes in hearing] : Reports no changes in hearing [Reports changes in vision] : Reports no changes in vision [ColonoscopyDate] : 9/2020 [ColonoscopyComments] : 9/2030

## 2023-07-25 ENCOUNTER — APPOINTMENT (OUTPATIENT)
Dept: ELECTROPHYSIOLOGY | Facility: CLINIC | Age: 58
End: 2023-07-25
Payer: COMMERCIAL

## 2023-07-25 ENCOUNTER — NON-APPOINTMENT (OUTPATIENT)
Age: 58
End: 2023-07-25

## 2023-07-25 VITALS
HEIGHT: 67 IN | WEIGHT: 215 LBS | DIASTOLIC BLOOD PRESSURE: 87 MMHG | OXYGEN SATURATION: 96 % | HEART RATE: 75 BPM | BODY MASS INDEX: 33.74 KG/M2 | SYSTOLIC BLOOD PRESSURE: 123 MMHG

## 2023-07-25 LAB
25(OH)D3 SERPL-MCNC: 35.7 NG/ML
ALBUMIN SERPL ELPH-MCNC: 4.9 G/DL
ALP BLD-CCNC: 121 U/L
ALT SERPL-CCNC: 68 U/L
ANION GAP SERPL CALC-SCNC: 14 MMOL/L
AST SERPL-CCNC: 46 U/L
BILIRUB SERPL-MCNC: 0.9 MG/DL
BUN SERPL-MCNC: 12 MG/DL
CALCIUM SERPL-MCNC: 10.4 MG/DL
CHLORIDE SERPL-SCNC: 102 MMOL/L
CHOLEST SERPL-MCNC: 150 MG/DL
CO2 SERPL-SCNC: 24 MMOL/L
CREAT SERPL-MCNC: 0.88 MG/DL
EGFR: 101 ML/MIN/1.73M2
ESTIMATED AVERAGE GLUCOSE: 117 MG/DL
GLUCOSE SERPL-MCNC: 93 MG/DL
HBA1C MFR BLD HPLC: 5.7 %
HDLC SERPL-MCNC: 39 MG/DL
LDLC SERPL CALC-MCNC: 87 MG/DL
MAGNESIUM SERPL-MCNC: 2 MG/DL
NONHDLC SERPL-MCNC: 111 MG/DL
PHOSPHATE SERPL-MCNC: 3.6 MG/DL
POTASSIUM SERPL-SCNC: 4.4 MMOL/L
PROT SERPL-MCNC: 7.8 G/DL
PSA SERPL-MCNC: 0.28 NG/ML
SODIUM SERPL-SCNC: 140 MMOL/L
TRIGL SERPL-MCNC: 131 MG/DL
TSH SERPL-ACNC: 1.37 UIU/ML

## 2023-07-25 PROCEDURE — 99213 OFFICE O/P EST LOW 20 MIN: CPT | Mod: 25

## 2023-07-25 PROCEDURE — 93000 ELECTROCARDIOGRAM COMPLETE: CPT

## 2023-07-26 NOTE — DISCUSSION/SUMMARY
[FreeTextEntry1] : 57 year old man with persistent atrial fibrillation in the setting of hypertension and coronary artery disease.  He underwent CHRISTIANO guided DCCV 4/13/2023.  Post procedure on tele he demonstrated frequent APCs.  On 5/26/2023 he underwent electrophysiology testing and ablation (successful pulmonary vein antral isolation (LPV and RPV triggers), successful caval tricuspid isthmus ablation). For post ablation arrhythmia he was started on sotalol.  We discussed that arrhythmia early post ablation may be related to a reconnected vein, inflammation, or a nonpulmonary vein trigger.  He is doing well. We will continue this medication until 6 months post ablation.  Should he recur off of sotalol we may consider for repeat ablation versus resumption of sotalol .

## 2023-07-26 NOTE — PHYSICAL EXAM

## 2023-07-26 NOTE — CARDIOLOGY SUMMARY
[de-identified] : today:  Sinus  Rhythm. QTc= 418 ms\par 3/28/2023: Atrial fibrillation  [de-identified] : 4/13/2023\par 1. Moderately enlarged right ventricular cavity size and normal systolic function.\par 2. No thrombus seen in the left atrial, left atrial appendage, right atrial or right atrial appendage.\par 3. No prior echocardiogram is available for comparison.\par 4. The left atrial appendage emptying velocity is normal at 71 cm/s. [de-identified] : 5/26/2023\par Successful pulmonary vein isolation\par           - LPV trigger and rotor for AF\par           - RPV automaticity post isolation\par Successful CTI ablation with bidirectional block\par Recconection right veins only with adenosine requirin additional ablation\par Otherwise normal LA voltage [de-identified] : 3/20/2023\par Moderate to severe LAD and LCX disease with normal FFR

## 2023-07-28 ENCOUNTER — APPOINTMENT (OUTPATIENT)
Dept: CARDIOLOGY | Facility: CLINIC | Age: 58
End: 2023-07-28
Payer: COMMERCIAL

## 2023-07-28 VITALS
SYSTOLIC BLOOD PRESSURE: 132 MMHG | WEIGHT: 216 LBS | BODY MASS INDEX: 33.9 KG/M2 | OXYGEN SATURATION: 95 % | HEIGHT: 67 IN | DIASTOLIC BLOOD PRESSURE: 90 MMHG | HEART RATE: 66 BPM | RESPIRATION RATE: 17 BRPM

## 2023-07-28 PROCEDURE — 99214 OFFICE O/P EST MOD 30 MIN: CPT | Mod: 25

## 2023-07-28 PROCEDURE — 93000 ELECTROCARDIOGRAM COMPLETE: CPT

## 2023-07-28 NOTE — PHYSICAL EXAM
[Well Developed] : well developed [Well Nourished] : well nourished [Normal Conjunctiva] : normal conjunctiva [Normal Venous Pressure] : normal venous pressure [No Murmur] : no murmur [Clear Lung Fields] : clear lung fields [Soft] : abdomen soft [Normal Gait] : normal gait [No Edema] : no edema [No Rash] : no rash [Moves all extremities] : moves all extremities [Alert and Oriented] : alert and oriented [Normal memory] : normal memory [de-identified] : Irregularly irregular heartbeat. [de-identified] : Anxious appearing

## 2023-07-28 NOTE — CARDIOLOGY SUMMARY
[de-identified] : March 20, 2023: There is a 25% distal left main stenosis.  The proximal LAD had a 60% stenosis (iFR was performed and deemed to be not obstructed blood flow) second diagonal had a 30% lesion.\par Ostial circumflex had a 70% stenosis IFR was deemed to be nonobstructive a flow.\par Right coronary artery had a proximal 30% stenosis, a mid 40% stenosis and the first RPL had a 50% stenosis.

## 2023-07-28 NOTE — HISTORY OF PRESENT ILLNESS
[FreeTextEntry1] : s/p ablation no won sotalol 80 bid.\par doing well.\par No palpitaiions or chest pain\par Walks 2-3 miles without difficulty.\par \par \par Prior:\par Had sudden diaphoresis and nausea. taken to the ER. Was in Afl with RVR. Started on Sotalol and monitored for 3 days.\par No further episodes.\par Feels okay overall.\par \par Prior:\par s/p AF ablation 5/26/2023.\par He feels okay. Taking toprol 50 qd.\par No chest pain or dyspnea.\par \par Prior:\par s/p CHRISTIANO cardioversion.\par Doing well. No bleeding or bruising on his current medical regimen.\par He denies any exertional chest pains or shortness of breath though he is not performing routine aerobic activity.  He is fairly active as a  at work.\par \par \par Prior:\par Coronary angio: Moderate diffuse disease. See report.\par NO angina.\par Wants to know\par \par Prior:\par Dear Sharon,\par Thank you for referring him for cardiovascular evaluation.  He is a 57-year-old with a history of paroxysmal atrial fibrillation and hypertension who presented to your office with sweating, jitteriness and was noted to be in atrial fibrillation with a rapid ventricular response.\par He reports that approximately 2 weeks ago his work time schedule was changed drastically and he was under severe stress.\par He started reporting feeling shaky and jittery as well as some shortness of breath.\par He had no chest pains, orthopnea, PND or leg edema.\par When he was seen in your office he was noted to be in atrial fibrillation with a rapid ventricular response.\par His previous atrial fibrillation episode occurred in the setting of the operating room and resolved with beta-blockade.\par He has a history of hypertension, GERD and an intestinal mass that is being monitored with annual MRI which has reportedly not grown for the past 2 years.\par He has a history of snoring.\par He has no history of coronary artery disease, diabetes mellitus, stroke, renal insufficiency or congestive heart failure.\par

## 2023-07-28 NOTE — DISCUSSION/SUMMARY
[FreeTextEntry1] : He is a 57-year-old with hypertension, GERD and episodes of atrial flutter/fibrillation.\par Now with diffuse coronary atherosclerosis without any definitive obstructive lesion.\par CAD: No anginal symptoms.  He is tolerating aggressive medical therapy with Plavix and colchicine.  No aspirin due to concomitant NOAC use.\par Continue high-dose statin with a goal LDL less than 55 mg/dL. Move statin to evening. discussed adding zetia. check labs in 2 months.\par \par A-fib: Now back in sinus rhythm s/p acute inflammatory episode post ablation. Now s/p Sotalol load. ECG is normal now. Continue oral anticoagulation for stroke risk reduction.\par HTN: Continue norvasc 5 mg.\par Will discuss AMY eval next visit. For now, start weight loss.\par followup in 6 weeks. [EKG obtained to assist in diagnosis and management of assessed problem(s)] : EKG obtained to assist in diagnosis and management of assessed problem(s)

## 2023-08-26 ENCOUNTER — EMERGENCY (EMERGENCY)
Facility: HOSPITAL | Age: 58
LOS: 1 days | Discharge: HOME | End: 2023-08-26
Attending: EMERGENCY MEDICINE | Admitting: STUDENT IN AN ORGANIZED HEALTH CARE EDUCATION/TRAINING PROGRAM
Payer: COMMERCIAL

## 2023-08-26 VITALS
OXYGEN SATURATION: 100 % | DIASTOLIC BLOOD PRESSURE: 97 MMHG | RESPIRATION RATE: 18 BRPM | SYSTOLIC BLOOD PRESSURE: 154 MMHG | TEMPERATURE: 98 F | HEART RATE: 83 BPM

## 2023-08-26 DIAGNOSIS — Z98.890 OTHER SPECIFIED POSTPROCEDURAL STATES: Chronic | ICD-10-CM

## 2023-08-26 DIAGNOSIS — Z98.89 OTHER SPECIFIED POSTPROCEDURAL STATES: Chronic | ICD-10-CM

## 2023-08-26 LAB
ALBUMIN SERPL ELPH-MCNC: 4.6 G/DL — SIGNIFICANT CHANGE UP (ref 3.3–5)
ALP SERPL-CCNC: 149 U/L — HIGH (ref 40–120)
ALT FLD-CCNC: 49 U/L — HIGH (ref 4–41)
ANION GAP SERPL CALC-SCNC: 9 MMOL/L — SIGNIFICANT CHANGE UP (ref 7–14)
APTT BLD: 32.1 SEC — SIGNIFICANT CHANGE UP (ref 24.5–35.6)
AST SERPL-CCNC: 41 U/L — HIGH (ref 4–40)
BASOPHILS # BLD AUTO: 0.04 K/UL — SIGNIFICANT CHANGE UP (ref 0–0.2)
BASOPHILS NFR BLD AUTO: 0.5 % — SIGNIFICANT CHANGE UP (ref 0–2)
BILIRUB SERPL-MCNC: 1 MG/DL — SIGNIFICANT CHANGE UP (ref 0.2–1.2)
BUN SERPL-MCNC: 18 MG/DL — SIGNIFICANT CHANGE UP (ref 7–23)
CALCIUM SERPL-MCNC: 9.2 MG/DL — SIGNIFICANT CHANGE UP (ref 8.4–10.5)
CHLORIDE SERPL-SCNC: 105 MMOL/L — SIGNIFICANT CHANGE UP (ref 98–107)
CK SERPL-CCNC: 221 U/L — HIGH (ref 30–200)
CO2 SERPL-SCNC: 27 MMOL/L — SIGNIFICANT CHANGE UP (ref 22–31)
CREAT SERPL-MCNC: 0.85 MG/DL — SIGNIFICANT CHANGE UP (ref 0.5–1.3)
EGFR: 102 ML/MIN/1.73M2 — SIGNIFICANT CHANGE UP
EOSINOPHIL # BLD AUTO: 0.22 K/UL — SIGNIFICANT CHANGE UP (ref 0–0.5)
EOSINOPHIL NFR BLD AUTO: 2.9 % — SIGNIFICANT CHANGE UP (ref 0–6)
GLUCOSE SERPL-MCNC: 96 MG/DL — SIGNIFICANT CHANGE UP (ref 70–99)
HCT VFR BLD CALC: 38.2 % — LOW (ref 39–50)
HGB BLD-MCNC: 13 G/DL — SIGNIFICANT CHANGE UP (ref 13–17)
IANC: 4.5 K/UL — SIGNIFICANT CHANGE UP (ref 1.8–7.4)
IMM GRANULOCYTES NFR BLD AUTO: 0.5 % — SIGNIFICANT CHANGE UP (ref 0–0.9)
INR BLD: 1.24 RATIO — HIGH (ref 0.85–1.18)
LYMPHOCYTES # BLD AUTO: 2.11 K/UL — SIGNIFICANT CHANGE UP (ref 1–3.3)
LYMPHOCYTES # BLD AUTO: 27.8 % — SIGNIFICANT CHANGE UP (ref 13–44)
MCHC RBC-ENTMCNC: 30.1 PG — SIGNIFICANT CHANGE UP (ref 27–34)
MCHC RBC-ENTMCNC: 34 GM/DL — SIGNIFICANT CHANGE UP (ref 32–36)
MCV RBC AUTO: 88.4 FL — SIGNIFICANT CHANGE UP (ref 80–100)
MONOCYTES # BLD AUTO: 0.69 K/UL — SIGNIFICANT CHANGE UP (ref 0–0.9)
MONOCYTES NFR BLD AUTO: 9.1 % — SIGNIFICANT CHANGE UP (ref 2–14)
NEUTROPHILS # BLD AUTO: 4.5 K/UL — SIGNIFICANT CHANGE UP (ref 1.8–7.4)
NEUTROPHILS NFR BLD AUTO: 59.2 % — SIGNIFICANT CHANGE UP (ref 43–77)
NRBC # BLD: 0 /100 WBCS — SIGNIFICANT CHANGE UP (ref 0–0)
NRBC # FLD: 0 K/UL — SIGNIFICANT CHANGE UP (ref 0–0)
PLATELET # BLD AUTO: 221 K/UL — SIGNIFICANT CHANGE UP (ref 150–400)
POTASSIUM SERPL-MCNC: 3.7 MMOL/L — SIGNIFICANT CHANGE UP (ref 3.5–5.3)
POTASSIUM SERPL-SCNC: 3.7 MMOL/L — SIGNIFICANT CHANGE UP (ref 3.5–5.3)
PROT SERPL-MCNC: 7.4 G/DL — SIGNIFICANT CHANGE UP (ref 6–8.3)
PROTHROM AB SERPL-ACNC: 13.9 SEC — HIGH (ref 9.5–13)
RBC # BLD: 4.32 M/UL — SIGNIFICANT CHANGE UP (ref 4.2–5.8)
RBC # FLD: 13.1 % — SIGNIFICANT CHANGE UP (ref 10.3–14.5)
SODIUM SERPL-SCNC: 141 MMOL/L — SIGNIFICANT CHANGE UP (ref 135–145)
WBC # BLD: 7.6 K/UL — SIGNIFICANT CHANGE UP (ref 3.8–10.5)
WBC # FLD AUTO: 7.6 K/UL — SIGNIFICANT CHANGE UP (ref 3.8–10.5)

## 2023-08-26 PROCEDURE — 99285 EMERGENCY DEPT VISIT HI MDM: CPT

## 2023-08-26 PROCEDURE — 93971 EXTREMITY STUDY: CPT | Mod: 26,LT

## 2023-08-26 NOTE — ED ADULT NURSE NOTE - OBJECTIVE STATEMENT
Pt. came to the ED for c/o swelling to left leg x4 days. pt stated that he slipped and twisted his leg on Monday while he was on vacation but did not fall down. pt stated that he is on blood thinners with a past medical history of a-fib and HTN

## 2023-08-26 NOTE — ED PROVIDER NOTE - PHYSICAL EXAMINATION
CONSTITUTIONAL: Well appearing, well nourished, awake, alert, oriented to person, place, time/situation and in no apparent distress  ENMT: Airway patent  EYES: Clear bilaterally  MUSCULOSKELETAL: Spine appears normal, no deformities, Left lower extremity: swollen calf tenderness to palpation, ecchymosis around ankle lateral malleolus with mild TTP, ecchymosis to distal posterior thigh, Homans negative, patient able to flex hamstring as resistance, pedal pulses to Doppler only, toes warm and cap refill less than 2 seconds  NEUROLOGIC: CN II-XII grossly intact, moves all extremities without lateralization  SKIN: Exposed skin normal color for race, warm, dry and intact

## 2023-08-26 NOTE — ED PROVIDER NOTE - OBJECTIVE STATEMENT
57-year-old male with pertinent past medical history of A-fib on Eliquis and clopidogrel presents with left leg pain after trip sustaining an injury in [place] on Monday, [place] the [place] today.   Patient was walking and slipped on a curb his leg went up and he fell backwards.  He has been ambulatory since but his leg has become more swollen and painful. 57-year-old male with pertinent past medical history of A-fib on Eliquis and clopidogrel presents with left leg pain after sustaining an injury in [place] on Monday (slipped backwards getting out of pool), [place] today. He has been ambulatory since but his leg has become more swollen and painful.

## 2023-08-26 NOTE — ED PROVIDER NOTE - CLINICAL SUMMARY MEDICAL DECISION MAKING FREE TEXT BOX
57-year-old male past medical history A-fib on Eliquis presents after fall on Monday.  Patient was getting out of a pool fell backwards  felt a snap in his left ankle.  Patient was able to ambulate after event.  [place]ame back to [place] today.  Patient having worsening pain to the area.  Denies any changes in sensation or weakness.  Not take any pain meds prior to arrival.  Vital signs stable.  Exam with left lower extremity calf tender to palpation heart compartment decreased distal pulses left lower extremity.  Concern for fracture versus dislocation versus contusion versus compartment syndrome.  Plan Labs x-ray ultrasound CT likely Ortho consult and reassess.  Patient declining pain meds at this time.

## 2023-08-26 NOTE — ED PROVIDER NOTE - NSFOLLOWUPINSTRUCTIONS_ED_ALL_ED_FT
Contusion    A contusion is a deep bruise. Contusions are the result of a blunt injury to tissues and muscle fibers under the skin. The skin overlying the contusion may turn blue, purple, or yellow. Symptoms also include pain and swelling in the injured area.    SEEK IMMEDIATE MEDICAL CARE IF YOU HAVE ANY OF THE FOLLOWING SYMPTOMS: severe pain, numbness, tingling, pain, weakness, or skin color/temperature change in any part of your body distal to the injury.      -Please take Tylenol up to 650 mg every 6 hours as needed for pain    -Please take any prescribed medications as instructed by your PMD    - Be sure to return to the ED if you develop new or worsening symptoms. Specific signs and symptoms to be vigilant of: fever or chills, chest pain, difficulty breathing, palpitations, loss of consciousness, headache, vision changes, slurred speech, difficulty swallowing or drooling, facial droop, weakness in the arms or legs, numbness or tingling, abdominal pain, nausea or vomiting, diarrhea, constipation, blood in the stool or urine, pain on urination, difficulty urinating.

## 2023-08-26 NOTE — ED PROVIDER NOTE - DIFFERENTIAL DIAGNOSIS
ankle fracture,  compartment syndrome, intramuscular hematoma, tendon rupture Differential Diagnosis

## 2023-08-26 NOTE — ED ADULT NURSE NOTE - CHIEF COMPLAINT QUOTE
Pt. c/o swelling to left leg x4 days. states he slipped and hurt his leg on Monday but did not fall down. bruising noted to back of leg. states on blood thinners.  PHx afib, HTN

## 2023-08-26 NOTE — ED PROVIDER NOTE - PROGRESS NOTE DETAILS
[facility] PGY3: Ultrasound and CT negative for emergent pathology at this time.  Compartments soft palpable distal pulses neurovascularly intact.  Low concern for compartment syndrome at this time.  Patient to ambulate.  Safe for discharge with strict return precautions given. [facility] PGY3: Ultrasound and CT negative for emergent pathology at this time.  Compartments soft palpable distal pulses neurovascularly intact.  Low concern for compartment syndrome at this time.  Patient to ambulate.  Safe for discharge with strict return precautions given.   Kenneth Lyles DO: agree with above. CT read noted, doubt infection at this time. strict return precautions discussed with patient at bedside if he has pain, numbness, increased swelling, diff moving leg to return to ED immediately and he expressed understanding. FROM of left leg, compartments soft nontender. patient ambulating with steady gait.

## 2023-08-26 NOTE — ED ADULT NURSE NOTE - NSFALLRISKINTERV_ED_ALL_ED

## 2023-08-26 NOTE — ED ADULT TRIAGE NOTE - CHIEF COMPLAINT QUOTE
Pt. c/o swelling to left leg x4 days. states he slipped and hurt his leg on Monday. bruising noted to back of leg. states on blood thinners. PHx afib, HTN Pt. c/o swelling to left leg x4 days. states he slipped and hurt his leg on Monday but did not fall down. bruising noted to back of leg. states on blood thinners.  PHx afib, HTN

## 2023-08-26 NOTE — ED PROVIDER NOTE - HOW PATIENT ADDRESSED, PROFILE
Pt again is verbally aggressive and stating \"If that bitch stares at me again, Im going to do something\". MD notified   Gerald

## 2023-08-26 NOTE — ED PROVIDER NOTE - PATIENT PORTAL LINK FT
Alert You can access the FollowMyHealth Patient Portal offered by [facility] by registering at the following website: [email or web address]. By joining [facility]’s FollowMyHealth portal, you will also be able to view your health information using other applications (apps) compatible with our system.

## 2023-08-27 VITALS
DIASTOLIC BLOOD PRESSURE: 89 MMHG | RESPIRATION RATE: 18 BRPM | TEMPERATURE: 98 F | SYSTOLIC BLOOD PRESSURE: 142 MMHG | HEART RATE: 82 BPM | OXYGEN SATURATION: 99 %

## 2023-08-27 LAB
BLD GP AB SCN SERPL QL: NEGATIVE — SIGNIFICANT CHANGE UP
RH IG SCN BLD-IMP: POSITIVE — SIGNIFICANT CHANGE UP

## 2023-08-27 PROCEDURE — 73610 X-RAY EXAM OF ANKLE: CPT | Mod: 26,LT

## 2023-08-27 PROCEDURE — 73706 CT ANGIO LWR EXTR W/O&W/DYE: CPT | Mod: 26,LT,52,MA

## 2023-09-18 ENCOUNTER — APPOINTMENT (OUTPATIENT)
Dept: ORTHOPEDIC SURGERY | Facility: CLINIC | Age: 58
End: 2023-09-18

## 2023-09-22 ENCOUNTER — RX RENEWAL (OUTPATIENT)
Age: 58
End: 2023-09-22

## 2023-09-27 ENCOUNTER — RX RENEWAL (OUTPATIENT)
Age: 58
End: 2023-09-27

## 2023-10-02 ENCOUNTER — RX RENEWAL (OUTPATIENT)
Age: 58
End: 2023-10-02

## 2023-10-10 ENCOUNTER — NON-APPOINTMENT (OUTPATIENT)
Age: 58
End: 2023-10-10

## 2023-10-10 ENCOUNTER — APPOINTMENT (OUTPATIENT)
Dept: CARDIOLOGY | Facility: CLINIC | Age: 58
End: 2023-10-10
Payer: COMMERCIAL

## 2023-10-10 VITALS
HEIGHT: 67 IN | BODY MASS INDEX: 33.9 KG/M2 | OXYGEN SATURATION: 95 % | WEIGHT: 216 LBS | DIASTOLIC BLOOD PRESSURE: 90 MMHG | HEART RATE: 73 BPM | SYSTOLIC BLOOD PRESSURE: 130 MMHG

## 2023-10-10 PROCEDURE — 93000 ELECTROCARDIOGRAM COMPLETE: CPT

## 2023-10-10 PROCEDURE — 99214 OFFICE O/P EST MOD 30 MIN: CPT | Mod: 25

## 2023-10-11 LAB
ALBUMIN SERPL ELPH-MCNC: 5 G/DL
ALP BLD-CCNC: 134 U/L
ALT SERPL-CCNC: 60 U/L
ANION GAP SERPL CALC-SCNC: 13 MMOL/L
AST SERPL-CCNC: 43 U/L
BILIRUB SERPL-MCNC: 0.8 MG/DL
BUN SERPL-MCNC: 11 MG/DL
CALCIUM SERPL-MCNC: 9.9 MG/DL
CHLORIDE SERPL-SCNC: 104 MMOL/L
CHOLEST SERPL-MCNC: 127 MG/DL
CO2 SERPL-SCNC: 26 MMOL/L
CREAT SERPL-MCNC: 0.8 MG/DL
EGFR: 104 ML/MIN/1.73M2
GLUCOSE SERPL-MCNC: 101 MG/DL
HCT VFR BLD CALC: 48.8 %
HDLC SERPL-MCNC: 41 MG/DL
HGB BLD-MCNC: 16.2 G/DL
LDLC SERPL CALC-MCNC: 66 MG/DL
MCHC RBC-ENTMCNC: 29.3 PG
MCHC RBC-ENTMCNC: 33.2 GM/DL
MCV RBC AUTO: 88.2 FL
NONHDLC SERPL-MCNC: 86 MG/DL
PLATELET # BLD AUTO: 252 K/UL
POTASSIUM SERPL-SCNC: 4.3 MMOL/L
PROT SERPL-MCNC: 7.7 G/DL
RBC # BLD: 5.53 M/UL
RBC # FLD: 13.9 %
SODIUM SERPL-SCNC: 143 MMOL/L
TRIGL SERPL-MCNC: 110 MG/DL
WBC # FLD AUTO: 7.8 K/UL

## 2023-10-11 RX ORDER — APIXABAN 5 MG/1
5 TABLET, FILM COATED ORAL
Qty: 180 | Refills: 3 | Status: ACTIVE | COMMUNITY
Start: 2023-03-03 | End: 1900-01-01

## 2023-10-11 RX ORDER — AMLODIPINE BESYLATE 5 MG/1
5 TABLET ORAL
Qty: 90 | Refills: 2 | Status: ACTIVE | COMMUNITY
Start: 2019-01-18 | End: 1900-01-01

## 2023-10-22 ENCOUNTER — TRANSCRIPTION ENCOUNTER (OUTPATIENT)
Age: 58
End: 2023-10-22

## 2023-10-27 ENCOUNTER — APPOINTMENT (OUTPATIENT)
Dept: PULMONOLOGY | Facility: CLINIC | Age: 58
End: 2023-10-27
Payer: COMMERCIAL

## 2023-10-27 VITALS
WEIGHT: 205 LBS | HEIGHT: 68 IN | OXYGEN SATURATION: 98 % | BODY MASS INDEX: 31.07 KG/M2 | DIASTOLIC BLOOD PRESSURE: 96 MMHG | SYSTOLIC BLOOD PRESSURE: 136 MMHG | HEART RATE: 74 BPM | RESPIRATION RATE: 17 BRPM | TEMPERATURE: 96.7 F

## 2023-10-27 PROCEDURE — 99213 OFFICE O/P EST LOW 20 MIN: CPT

## 2023-11-18 ENCOUNTER — NON-APPOINTMENT (OUTPATIENT)
Age: 58
End: 2023-11-18

## 2023-11-28 ENCOUNTER — APPOINTMENT (OUTPATIENT)
Dept: ELECTROPHYSIOLOGY | Facility: CLINIC | Age: 58
End: 2023-11-28
Payer: COMMERCIAL

## 2023-11-28 ENCOUNTER — NON-APPOINTMENT (OUTPATIENT)
Age: 58
End: 2023-11-28

## 2023-11-28 VITALS
HEART RATE: 69 BPM | WEIGHT: 205 LBS | BODY MASS INDEX: 31.17 KG/M2 | DIASTOLIC BLOOD PRESSURE: 88 MMHG | SYSTOLIC BLOOD PRESSURE: 142 MMHG | OXYGEN SATURATION: 97 %

## 2023-11-28 PROCEDURE — 93000 ELECTROCARDIOGRAM COMPLETE: CPT

## 2023-11-28 PROCEDURE — 99213 OFFICE O/P EST LOW 20 MIN: CPT | Mod: 25

## 2023-12-26 ENCOUNTER — RX RENEWAL (OUTPATIENT)
Age: 58
End: 2023-12-26

## 2024-01-02 ENCOUNTER — NON-APPOINTMENT (OUTPATIENT)
Age: 59
End: 2024-01-02

## 2024-01-02 ENCOUNTER — APPOINTMENT (OUTPATIENT)
Dept: CARDIOLOGY | Facility: CLINIC | Age: 59
End: 2024-01-02
Payer: COMMERCIAL

## 2024-01-02 VITALS
DIASTOLIC BLOOD PRESSURE: 98 MMHG | WEIGHT: 230 LBS | HEIGHT: 68 IN | SYSTOLIC BLOOD PRESSURE: 140 MMHG | HEART RATE: 91 BPM | OXYGEN SATURATION: 94 % | BODY MASS INDEX: 34.86 KG/M2

## 2024-01-02 DIAGNOSIS — I51.9 HEART DISEASE, UNSPECIFIED: ICD-10-CM

## 2024-01-02 PROCEDURE — 93000 ELECTROCARDIOGRAM COMPLETE: CPT

## 2024-01-02 PROCEDURE — 99214 OFFICE O/P EST MOD 30 MIN: CPT | Mod: 25

## 2024-01-02 RX ORDER — SOTALOL HYDROCHLORIDE 80 MG/1
80 TABLET ORAL
Qty: 14 | Refills: 0 | Status: DISCONTINUED | COMMUNITY
Start: 1900-01-01 | End: 2024-01-02

## 2024-01-02 NOTE — CARDIOLOGY SUMMARY
[de-identified] : March 20, 2023: There is a 25% distal left main stenosis.  The proximal LAD had a 60% stenosis (iFR was performed and deemed to be not obstructed blood flow) second diagonal had a 30% lesion.\par  Ostial circumflex had a 70% stenosis IFR was deemed to be nonobstructive a flow.\par  Right coronary artery had a proximal 30% stenosis, a mid 40% stenosis and the first RPL had a 50% stenosis.

## 2024-01-02 NOTE — HISTORY OF PRESENT ILLNESS
[FreeTextEntry1] : Feels fatigued. He gained weight. Off sotalol since Dec 1. Has not been able to find a dentist in his plan to get a dental appliance.    Prior: tore left gastrox. No further exercise. He has been taking his medication without difficulty.  No bleeding or bruising aside from the injury to his leg. He reports waking up with headaches on occasions.  Prior: s/p ablation no won sotalol 80 bid. doing well. No palpitaiions or chest pain Walks 2-3 miles without difficulty.  Prior: Had sudden diaphoresis and nausea. taken to the ER. Was in Afl with RVR. Started on Sotalol and monitored for 3 days. No further episodes. Feels okay overall.  Prior: s/p AF ablation 5/26/2023. He feels okay. Taking toprol 50 qd. No chest pain or dyspnea.  Prior: s/p CHRISTIANO cardioversion. Doing well. No bleeding or bruising on his current medical regimen. He denies any exertional chest pains or shortness of breath though he is not performing routine aerobic activity.  He is fairly active as a  at work.   Prior: Coronary angio: Moderate diffuse disease. See report. NO angina. Wants to know  Prior: Dear Sharon, Thank you for referring him for cardiovascular evaluation.  He is a 57-year-old with a history of paroxysmal atrial fibrillation and hypertension who presented to your office with sweating, jitteriness and was noted to be in atrial fibrillation with a rapid ventricular response. He reports that approximately 2 weeks ago his work time schedule was changed drastically and he was under severe stress. He started reporting feeling shaky and jittery as well as some shortness of breath. He had no chest pains, orthopnea, PND or leg edema. When he was seen in your office he was noted to be in atrial fibrillation with a rapid ventricular response. His previous atrial fibrillation episode occurred in the setting of the operating room and resolved with beta-blockade. He has a history of hypertension, GERD and an intestinal mass that is being monitored with annual MRI which has reportedly not grown for the past 2 years. He has a history of snoring. He has no history of coronary artery disease, diabetes mellitus, stroke, renal insufficiency or congestive heart failure.

## 2024-01-02 NOTE — DISCUSSION/SUMMARY
[FreeTextEntry1] : He is a 58-year-old with hypertension, GERD, Nonobstructive coronary artery disease and episodes of Symptomatic atrial flutter/fibrillation. Now in AF.   CAD: No anginal symptoms.  He is tolerating aggressive medical therapy with Plavix and colchicine. LDL was a bit high.   Continue high-dose statin And Zetia with a goal LDL less than 55 mg/dL.   A-fib: Now back in sinus rhythm s/p acute inflammatory episode post ablation. Now s/p Sotalol load. ECG is normal now. Continue oral anticoagulation for stroke risk reduction. Will restart 100mg qd. HTN: Borderline control. Continue Norvasc 5 mg. AMY diagnosed. Will try CPAP. Plan to message Dr. Suárez.   follow-up in 8 weeks. [EKG obtained to assist in diagnosis and management of assessed problem(s)] : EKG obtained to assist in diagnosis and management of assessed problem(s)

## 2024-01-02 NOTE — PHYSICAL EXAM
[Well Developed] : well developed [Well Nourished] : well nourished [Normal Conjunctiva] : normal conjunctiva [Normal Venous Pressure] : normal venous pressure [No Murmur] : no murmur [Clear Lung Fields] : clear lung fields [Soft] : abdomen soft [Normal Gait] : normal gait [No Edema] : no edema [No Rash] : no rash [Moves all extremities] : moves all extremities [Alert and Oriented] : alert and oriented [Normal memory] : normal memory [de-identified] : Irregularly irregular heartbeat. [de-identified] : Anxious appearing

## 2024-01-08 ENCOUNTER — APPOINTMENT (OUTPATIENT)
Dept: INTERNAL MEDICINE | Facility: CLINIC | Age: 59
End: 2024-01-08

## 2024-01-18 NOTE — ASU PATIENT PROFILE, ADULT - FALL HARM RISK - FALLEN IN PAST
Nephrology Progress Note      Patient: Indy Mullen               Sex: female            MRN:  [id number]      YOB: 1942      Age:  81 year old           Date: 1/18/2024    Assessment/Plan   # ESRD --due to diabetes, hypertension on dialysis Monday Wednesday Friday at [facility] with Dr. Bray   -- new RIJ permcath placement 1/12       --  continue HD per schedule    # Dialysis access.  Right IJ PermCath, this was removed on January 5 as bacteremia thought to be from HD line .  IJ is occluded so temp line placed through same venotomy by IR -- now has new RIJ permcath      # Group A strep bacteremia, no evidence of pneumonia, TTE negative for endocarditis.   HD catheter removed 1/5. Had septic left knee and drainage done 1/6 pm,- on vanco iv  -bed bugs found on patient per RN    - ID on board     # Hypertension. Blood pressures well controlled. On Amlodipine, Clonidine and Metoprolol    # Anemia. Likely secondary to ESRD, blood draws, recent surgery, sepsis etch. Hemoglobin ~ 8. Getting Venofer. Resumed JACKIE. May need transfusion    # Secondary hyperparathyroidism. Phos well controlled    D/w Dr. Bella          Subjective:   01/18/24 :     Had drainage of left knee for septic joint 1/6  On room air, appears comfortable      PMHx, FHx, SHx, and review of systems reviewed and otherwise unchanged.    Reviewed on 1/18/2024      Objective:   Visit Vitals  /65 (BP Location: LUE - Left upper extremity, Patient Position: Semi-Perales's)   Pulse 83   Temp 99.2 °F (37.3 °C) (Oral)   Resp 18   Ht 5' 4.17\" (1.63 m)   Wt 72.7 kg (160 lb 4.4 oz)   SpO2 99%   BMI 27.36 kg/m²      I/O last 3 completed shifts:  In: 480 [P.O.:480]  Out: 350 [Urine:350]    Physical Exam:  Constitutional :Patient is awake ,in nad  Eyes: Conjunctiva normal, Eyelids not drooping.  ENT: Normal hearing. Nose not deformed, moist oral mucosa.  Neck: No obvious mass, no lymphadenopathy.  Cardiovascular : Rate and rhythm is  regular. No murmur. Peripheral edema is absent.  Respiratory: no wheezes  decr BS bases   GI : Abdomen is soft, non tender. There is no obvious ascites.  : External genitalia not examined. There is no Levy catheter in place  Musculoskeletal :No obvious deformity or swelling of joints. There is no ankle edema.  Skin: No rashes noted.  Neuro:Patient is alert and oriented times 2. There is no obvious focal deficit.  Pysch: Cooperative, Affect is normal  Dialysis Access:  RIJ tunneled cath      Current Facility-Administered Medications   Medication Dose Route Frequency Provider Last Rate Last Admin    polyethylene glycol (MIRALAX) packet 17 g  17 g Oral BID Yesenia Bella DO        epoetin mary (PROCRIT,EPOGEN) 99097 UNIT/ML injection 20,000 Units  20,000 Units Subcutaneous Once per day on Mon Wed Fri Fransisco Lobo MD   20,000 Units at 01/17/24 1639    vancomycin (VANCOCIN) 500 mg in sodium chloride 0.9 % 100 mL IVPB  500 mg Intravenous Once per day on Mon Wed Fri Bryanna Gonzales DO 50 mL/hr at 01/17/24 2240 500 mg at 01/17/24 2240    heparin (porcine) injection 5,000 Units  5,000 Units Subcutaneous 3 times per day Alvaro Zamora MD   5,000 Units at 01/18/24 1259    sodium chloride 0.9 % injection 2 mL  2 mL Intracatheter 2 times per day Angel Baeza MD   2 mL at 01/18/24 0811    amLODIPine (NORVASC) tablet 10 mg  10 mg Oral Daily Angel Baeza MD   10 mg at 01/18/24 0811    aspirin (ECOTRIN) enteric coated tablet 81 mg  81 mg Oral Daily Angel Baeza MD   81 mg at 01/18/24 0811    atorvastatin (LIPITOR) tablet 40 mg  40 mg Oral Daily Angel Baeza MD   40 mg at 01/18/24 0811    cloNIDine (CATAPRES) tablet 0.1 mg  0.1 mg Oral BID Angel Baeza MD   0.1 mg at 01/18/24 0811    famotidine (PEPCID) tablet 20 mg  20 mg Oral Daily Angel Baeza MD   20 mg at 01/18/24 0811    metoPROLOL tartrate (LOPRESSOR) tablet 25 mg  25 mg Oral 2 times per day Angel Baeza MD   25 mg at 01/18/24 0840    VANCOMYCIN -  PHARMACIST MONITORED Misc   Does not apply See Admin Instructions Bryanna Gonzales,          Lab/Data Reviewed:    CBC:   Recent Labs   Lab 01/18/24  1400 01/18/24  0506 01/17/24  0635 01/16/24  0515 01/15/24  0607   WBC  --  13.3* 15.1* 17.8* 20.3*   RBC  --  2.61* 2.73* 2.92* 2.97*   HGB 7.6* 7.0* 7.5* 7.9* 8.1*   HCT 24.2* 22.7* 23.5* 25.2* 25.7*   MCV  --  87.0 86.1 86.3 86.5   MCHC  --  30.8* 31.9* 31.3* 31.5*   RDW-CV  --  15.8* 15.9* 15.6* 15.3*   PLT  --  262 253 224 264   Lymphocytes, Percent  --  17 19 19 15       CMP:  Recent Labs   Lab 01/18/24  0507 01/17/24  0635 01/15/24  0607   SODIUM 135 134* 136   POTASSIUM 3.9 4.3 4.3   CHLORIDE 104 104 102   CO2 28 24 25   BUN 26* 37* 47*   CREATININE 4.50* 5.84* 6.88*   GLUCOSE 100* 86 91   ALBUMIN  --  2.0* 1.9*   PHOS  --   --  4.7   AST  --  16  --    GPT  --  7  --    ALKPT  --  37*  --    BILIRUBIN  --  0.4  --        Magnesium   Date Value Ref Range Status   01/05/2024 1.3 (L) 1.7 - 2.4 mg/dL Final   10/31/2023 2.0 1.7 - 2.4 mg/dL Final   10/30/2023 1.8 1.7 - 2.4 mg/dL Final     Phosphorus   Date Value Ref Range Status   01/15/2024 4.7 2.4 - 4.7 mg/dL Final   11/01/2023 3.9 2.4 - 4.7 mg/dL Final   11/24/2022 4.4 2.4 - 4.7 mg/dL Final           No

## 2024-02-27 ENCOUNTER — NON-APPOINTMENT (OUTPATIENT)
Age: 59
End: 2024-02-27

## 2024-02-27 ENCOUNTER — APPOINTMENT (OUTPATIENT)
Dept: CARDIOLOGY | Facility: CLINIC | Age: 59
End: 2024-02-27
Payer: COMMERCIAL

## 2024-02-27 VITALS
HEART RATE: 84 BPM | SYSTOLIC BLOOD PRESSURE: 130 MMHG | DIASTOLIC BLOOD PRESSURE: 90 MMHG | WEIGHT: 224 LBS | BODY MASS INDEX: 34.06 KG/M2 | OXYGEN SATURATION: 93 %

## 2024-02-27 PROCEDURE — 93000 ELECTROCARDIOGRAM COMPLETE: CPT

## 2024-02-27 PROCEDURE — 99214 OFFICE O/P EST MOD 30 MIN: CPT | Mod: 25

## 2024-02-27 RX ORDER — METOPROLOL SUCCINATE 100 MG/1
100 TABLET, EXTENDED RELEASE ORAL DAILY
Qty: 90 | Refills: 2 | Status: DISCONTINUED | COMMUNITY
Start: 2023-03-02 | End: 2024-02-27

## 2024-02-27 RX ORDER — NEBIVOLOL 5 MG/1
5 TABLET ORAL DAILY
Qty: 30 | Refills: 1 | Status: DISCONTINUED | COMMUNITY
Start: 2024-01-15 | End: 2024-02-27

## 2024-03-07 LAB
ALBUMIN SERPL ELPH-MCNC: 4.9 G/DL
ALP BLD-CCNC: 123 U/L
ALT SERPL-CCNC: 63 U/L
ANION GAP SERPL CALC-SCNC: 12 MMOL/L
AST SERPL-CCNC: 46 U/L
BASOPHILS # BLD AUTO: 0.04 K/UL
BASOPHILS NFR BLD AUTO: 0.5 %
BILIRUB SERPL-MCNC: 0.7 MG/DL
BUN SERPL-MCNC: 18 MG/DL
CALCIUM SERPL-MCNC: 9.5 MG/DL
CHLORIDE SERPL-SCNC: 107 MMOL/L
CHOLEST SERPL-MCNC: 120 MG/DL
CO2 SERPL-SCNC: 26 MMOL/L
CREAT SERPL-MCNC: 1.04 MG/DL
EGFR: 84 ML/MIN/1.73M2
EOSINOPHIL # BLD AUTO: 0.18 K/UL
EOSINOPHIL NFR BLD AUTO: 2.4 %
ESTIMATED AVERAGE GLUCOSE: 114 MG/DL
GLUCOSE SERPL-MCNC: 104 MG/DL
HBA1C MFR BLD HPLC: 5.6 %
HCT VFR BLD CALC: 45.6 %
HDLC SERPL-MCNC: 37 MG/DL
HGB BLD-MCNC: 15.2 G/DL
IMM GRANULOCYTES NFR BLD AUTO: 0.3 %
LDLC SERPL CALC-MCNC: 51 MG/DL
LYMPHOCYTES # BLD AUTO: 2.06 K/UL
LYMPHOCYTES NFR BLD AUTO: 27.7 %
MAN DIFF?: NORMAL
MCHC RBC-ENTMCNC: 30.1 PG
MCHC RBC-ENTMCNC: 33.3 GM/DL
MCV RBC AUTO: 90.3 FL
MONOCYTES # BLD AUTO: 0.79 K/UL
MONOCYTES NFR BLD AUTO: 10.6 %
NEUTROPHILS # BLD AUTO: 4.34 K/UL
NEUTROPHILS NFR BLD AUTO: 58.5 %
NONHDLC SERPL-MCNC: 83 MG/DL
PLATELET # BLD AUTO: 222 K/UL
POTASSIUM SERPL-SCNC: 4.3 MMOL/L
PROT SERPL-MCNC: 7.3 G/DL
RBC # BLD: 5.05 M/UL
RBC # FLD: 13.7 %
SODIUM SERPL-SCNC: 145 MMOL/L
TRIGL SERPL-MCNC: 196 MG/DL
WBC # FLD AUTO: 7.43 K/UL

## 2024-03-11 ENCOUNTER — APPOINTMENT (OUTPATIENT)
Dept: ORTHOPEDIC SURGERY | Facility: CLINIC | Age: 59
End: 2024-03-11
Payer: COMMERCIAL

## 2024-03-11 ENCOUNTER — APPOINTMENT (OUTPATIENT)
Dept: INTERNAL MEDICINE | Facility: CLINIC | Age: 59
End: 2024-03-11
Payer: COMMERCIAL

## 2024-03-11 VITALS
BODY MASS INDEX: 34.1 KG/M2 | WEIGHT: 225 LBS | OXYGEN SATURATION: 97 % | HEIGHT: 68 IN | SYSTOLIC BLOOD PRESSURE: 143 MMHG | HEART RATE: 94 BPM | DIASTOLIC BLOOD PRESSURE: 100 MMHG | TEMPERATURE: 97.9 F

## 2024-03-11 VITALS — HEIGHT: 68 IN | BODY MASS INDEX: 32.58 KG/M2 | WEIGHT: 215 LBS

## 2024-03-11 DIAGNOSIS — E78.5 HYPERLIPIDEMIA, UNSPECIFIED: ICD-10-CM

## 2024-03-11 DIAGNOSIS — I70.90 UNSPECIFIED ATHEROSCLEROSIS: ICD-10-CM

## 2024-03-11 DIAGNOSIS — D35.00 BENIGN NEOPLASM OF UNSPECIFIED ADRENAL GLAND: ICD-10-CM

## 2024-03-11 DIAGNOSIS — I48.91 UNSPECIFIED ATRIAL FIBRILLATION: ICD-10-CM

## 2024-03-11 DIAGNOSIS — E66.9 OBESITY, UNSPECIFIED: ICD-10-CM

## 2024-03-11 DIAGNOSIS — N52.9 MALE ERECTILE DYSFUNCTION, UNSPECIFIED: ICD-10-CM

## 2024-03-11 DIAGNOSIS — G47.33 OBSTRUCTIVE SLEEP APNEA (ADULT) (PEDIATRIC): ICD-10-CM

## 2024-03-11 PROCEDURE — 99215 OFFICE O/P EST HI 40 MIN: CPT

## 2024-03-11 PROCEDURE — G2211 COMPLEX E/M VISIT ADD ON: CPT

## 2024-03-11 PROCEDURE — 99203 OFFICE O/P NEW LOW 30 MIN: CPT

## 2024-03-11 NOTE — HISTORY OF PRESENT ILLNESS
[FreeTextEntry1] : He has been feeling fine. No bleeding. but bruising during injuries. Off sotalol since Nov. Didnt tolerate metoprolol in the past.  Prior: Feels fatigued. He gained weight. Off sotalol since Dec 1. Has not been able to find a dentist in his plan to get a dental appliance.  Prior: tore left gastrox. No further exercise. He has been taking his medication without difficulty.  No bleeding or bruising aside from the injury to his leg. He reports waking up with headaches on occasions.  Prior: s/p ablation no won sotalol 80 bid. doing well. No palpitaiions or chest pain Walks 2-3 miles without difficulty.  Prior: Had sudden diaphoresis and nausea. taken to the ER. Was in Afl with RVR. Started on Sotalol and monitored for 3 days. No further episodes. Feels okay overall.  Prior: s/p AF ablation 5/26/2023. He feels okay. Taking toprol 50 qd. No chest pain or dyspnea.  Prior: s/p CHRISTIANO cardioversion. Doing well. No bleeding or bruising on his current medical regimen. He denies any exertional chest pains or shortness of breath though he is not performing routine aerobic activity.  He is fairly active as a  at work.   Prior: Coronary angio: Moderate diffuse disease. See report. NO angina. Wants to know  Prior: Dear Sharon, Thank you for referring him for cardiovascular evaluation.  He is a 57-year-old with a history of paroxysmal atrial fibrillation and hypertension who presented to your office with sweating, jitteriness and was noted to be in atrial fibrillation with a rapid ventricular response. He reports that approximately 2 weeks ago his work time schedule was changed drastically and he was under severe stress. He started reporting feeling shaky and jittery as well as some shortness of breath. He had no chest pains, orthopnea, PND or leg edema. When he was seen in your office he was noted to be in atrial fibrillation with a rapid ventricular response. His previous atrial fibrillation episode occurred in the setting of the operating room and resolved with beta-blockade. He has a history of hypertension, GERD and an intestinal mass that is being monitored with annual MRI which has reportedly not grown for the past 2 years. He has a history of snoring. He has no history of coronary artery disease, diabetes mellitus, stroke, renal insufficiency or congestive heart failure.

## 2024-03-11 NOTE — HISTORY OF PRESENT ILLNESS
[Right] : right hand dominant [FreeTextEntry1] : Pt is a 59y/o male c/o bilateral hand pain, numbness and tingling.  He states that he had symptoms years ago.  At that time he had an EMG which showed carpal tunnel syndrome.  He wore a brace and the symptoms subsided.  He states that about 6 weeks ago the symptoms returned.  They are intermittent but frequent and severe.  It wakes him from sleep.  It is exacerbated by writing, talking on a phone or driving a car.  He has never had a cortisone injection for this.

## 2024-03-11 NOTE — ADDENDUM
[FreeTextEntry1] : He is scheduled to undergo carpal tunnel release surgery.  He asked me to make recommendations regarding his medical therapy and his fitness for this procedure. He is optimized from a cardiovascular standpoint to proceed with the planned surgery. He may discontinue his Eliquis for 5 doses (2 full days) prior to the planned procedure. He may discontinue clopidogrel for 7 days prior to the procedure.

## 2024-03-11 NOTE — ADDENDUM
[FreeTextEntry1] :  I, Sánchez Bonilla wrote this note acting as a scribe for Dr. Pan Davenport on Mar 11, 2024.

## 2024-03-11 NOTE — DISCUSSION/SUMMARY
[EKG obtained to assist in diagnosis and management of assessed problem(s)] : EKG obtained to assist in diagnosis and management of assessed problem(s) [FreeTextEntry1] : He is a 58-year-old with hypertension, GERD, Nonobstructive coronary artery disease and episodes of Symptomatic atrial flutter/fibrillation. Now in AF.   CAD: No anginal symptoms.  He is tolerating aggressive medical therapy with Plavix and colchicine. LDL was 66. Which is fine. Continue high-dose statin And Zetia with a goal LDL less than 55 mg/dL.   A-fib: Now in sinus rhythm s/p acute inflammatory episode post ablation. Now s/p transient Sotalol - off since Dec. ECG is normal now. Continue oral anticoagulation for stroke risk reduction.   HTN: Borderline control. Continue Norvasc 5 mg. AMY diagnosed. Plan to message Dr. Suárez. Will likely need CPAP.   follow-up in 12 weeks.

## 2024-03-11 NOTE — CARDIOLOGY SUMMARY
[de-identified] : March 20, 2023: There is a 25% distal left main stenosis.  The proximal LAD had a 60% stenosis (iFR was performed and deemed to be not obstructed blood flow) second diagonal had a 30% lesion.\par  Ostial circumflex had a 70% stenosis IFR was deemed to be nonobstructive a flow.\par  Right coronary artery had a proximal 30% stenosis, a mid 40% stenosis and the first RPL had a 50% stenosis.

## 2024-03-11 NOTE — PHYSICAL EXAM
[de-identified] : Patient is WDWN, alert, and in no acute distress. Breathing is unlabored. He is grossly oriented to person, place, and time.  Right Wrist:   No tenderness, edema, or deformities. No thenar atrophy. Full ROM with decreased sensation along median nerve distribution. Tests/Signs: Tinel's sign is positive over carpal tunnel, Phalen's test is positive.   Left Wrist:   No tenderness, edema, or deformities. No thenar atrophy. Full ROM with decreased sensation along median nerve distribution. Tests/Signs: Tinel's sign is positive over carpal tunnel, Phalen's test is positive.

## 2024-03-11 NOTE — END OF VISIT
[FreeTextEntry3] :  All medical record entries made by the Scribe were at my,  Dr. Pan Davenport MD., direction and personally dictated by me on 03/11/2024. I have personally reviewed the chart and agree that the record accurately reflects my personal performance of the history, physical exam, assessment and plan.

## 2024-03-11 NOTE — DISCUSSION/SUMMARY
[FreeTextEntry1] : The underlying pathophysiology was reviewed with the patient. XR films were reviewed with the patient. Discussed at length the nature of the patient's condition.   Patient was advised to take OTC medications and topical analgesic for pain management.  Other treatment options were discussed which includes cortisone injection or surgery. I recommended surgery since he had carpal tunnel for a long time. Risks and benefits discussed. Nature of the operation  Right Hand Carpal Tunnel Release First reviewed. Post-operative recovery and patient experience were reviewed. Patient will be put in contact with my surgical coordinator.  All questions answered, understanding verbalized. Patient in agreement with plan of care.

## 2024-03-11 NOTE — PHYSICAL EXAM
[Well Nourished] : well nourished [Well Developed] : well developed [Normal Conjunctiva] : normal conjunctiva [Normal Venous Pressure] : normal venous pressure [No Murmur] : no murmur [Clear Lung Fields] : clear lung fields [Soft] : abdomen soft [Normal Gait] : normal gait [No Edema] : no edema [No Rash] : no rash [Moves all extremities] : moves all extremities [Alert and Oriented] : alert and oriented [Normal memory] : normal memory [de-identified] : Irregularly irregular heartbeat. [de-identified] : Anxious appearing

## 2024-03-12 RX ORDER — LORAZEPAM 0.5 MG/1
0.5 TABLET ORAL
Qty: 5 | Refills: 0 | Status: ACTIVE | COMMUNITY
Start: 2023-03-15 | End: 1900-01-01

## 2024-03-15 ENCOUNTER — OUTPATIENT (OUTPATIENT)
Dept: OUTPATIENT SERVICES | Facility: HOSPITAL | Age: 59
LOS: 1 days | End: 2024-03-15
Payer: COMMERCIAL

## 2024-03-15 VITALS
RESPIRATION RATE: 18 BRPM | SYSTOLIC BLOOD PRESSURE: 131 MMHG | HEART RATE: 88 BPM | WEIGHT: 226.19 LBS | DIASTOLIC BLOOD PRESSURE: 85 MMHG | OXYGEN SATURATION: 98 % | TEMPERATURE: 98 F | HEIGHT: 68 IN

## 2024-03-15 DIAGNOSIS — G56.03 CARPAL TUNNEL SYNDROME, BILATERAL UPPER LIMBS: ICD-10-CM

## 2024-03-15 DIAGNOSIS — Z01.818 ENCOUNTER FOR OTHER PREPROCEDURAL EXAMINATION: ICD-10-CM

## 2024-03-15 DIAGNOSIS — Z98.890 OTHER SPECIFIED POSTPROCEDURAL STATES: Chronic | ICD-10-CM

## 2024-03-15 DIAGNOSIS — Z98.89 OTHER SPECIFIED POSTPROCEDURAL STATES: Chronic | ICD-10-CM

## 2024-03-15 DIAGNOSIS — G56.01 CARPAL TUNNEL SYNDROME, RIGHT UPPER LIMB: ICD-10-CM

## 2024-03-15 PROCEDURE — G0463: CPT

## 2024-03-15 RX ORDER — ATORVASTATIN CALCIUM 80 MG/1
1 TABLET, FILM COATED ORAL
Qty: 0 | Refills: 0 | DISCHARGE

## 2024-03-15 RX ORDER — COLCHICINE 0.6 MG
1 TABLET ORAL
Refills: 0 | DISCHARGE

## 2024-03-15 RX ORDER — METOPROLOL TARTRATE 50 MG
3 TABLET ORAL
Refills: 0 | DISCHARGE

## 2024-03-15 RX ORDER — APIXABAN 2.5 MG/1
1 TABLET, FILM COATED ORAL
Refills: 0 | DISCHARGE

## 2024-03-15 RX ORDER — APIXABAN 2.5 MG/1
1 TABLET, FILM COATED ORAL
Qty: 0 | Refills: 0 | DISCHARGE

## 2024-03-15 RX ORDER — METOPROLOL TARTRATE 50 MG
1 TABLET ORAL
Refills: 0 | DISCHARGE

## 2024-03-15 RX ORDER — METOPROLOL TARTRATE 50 MG
1 TABLET ORAL
Qty: 0 | Refills: 0 | DISCHARGE

## 2024-03-15 RX ORDER — AMLODIPINE BESYLATE 2.5 MG/1
1 TABLET ORAL
Qty: 0 | Refills: 0 | DISCHARGE

## 2024-03-15 NOTE — H&P PST ADULT - PROBLEM SELECTOR PLAN 1
Scheduled for Right carpal tunnel release  pre-op instructions provided  Obtain medical, cardiology clearance  Eliquis and Plavix hold as per cardiology instructions

## 2024-03-15 NOTE — H&P PST ADULT - COMMENTS
denies h/o DVT, PE  Patient is on colchicine 0.6 mg , denies h/o Gout denies personal and family  h/o DVT, PE  Patient is on colchicine 0.6 mg , denies h/o Gout  patient reports h/o intestinal mass that is being monitored with annual MRI which has reportedly not grown for the past 2 years.

## 2024-03-15 NOTE — H&P PST ADULT - NSICDXPASTMEDICALHX_GEN_ALL_CORE_FT
PAST MEDICAL HISTORY:  A-fib     Abdominal mass     Anxiety     Chronic GERD     HTN (hypertension)     Mixed hyperlipidemia     AMY (obstructive sleep apnea)      PAST MEDICAL HISTORY:  A-fib     Abdominal mass     Anxiety     Carpal tunnel syndrome     Chronic GERD     HTN (hypertension)     Mixed hyperlipidemia     Obesity     AMY (obstructive sleep apnea)      PAST MEDICAL HISTORY:  A-fib     Abdominal mass     Anxiety     Atrial fibrillation status post cardioversion     Carpal tunnel syndrome     Chronic GERD     HTN (hypertension)     Mixed hyperlipidemia     Obesity     AMY (obstructive sleep apnea)     Palpitation      PAST MEDICAL HISTORY:  A-fib     Abdominal mass     Anxiety     Atrial fibrillation status post cardioversion     Carpal tunnel syndrome     Chronic GERD     HTN (hypertension)     Mixed hyperlipidemia     Obesity     AMY (obstructive sleep apnea)     Palpitation     S/P ablation of atrial flutter

## 2024-03-15 NOTE — H&P PST ADULT - WEIGHT IN LBS
Chart Review  09/07/2022    Per chart review, [facility] sent contact letter to [facility] [place] asking to call [facility] and reschedule new home visit  [facility] [place] had not responded to phone calls and letter  This referral received from Constantino Kim to assist with Waiver Program application for [facility] Daughter will be closed on my behalf today 09/07/2022 due loss of communication and poor cooperation from [facility] [place] 
226.1

## 2024-03-15 NOTE — H&P PST ADULT - NSICDXPASTSURGICALHX_GEN_ALL_CORE_FT
PAST SURGICAL HISTORY:  History of knee surgery     History of shoulder surgery     S/P arthroscopy left shoulder- 2014, left knee-1999    S/P cardiac cath

## 2024-03-15 NOTE — H&P PST ADULT - TEMPERATURE IN FAHRENHEIT (DEGREES F)
Called patient.  Let him know the results.  He didn't have any questions about the abnormal readings on the cholesterol.  Patient was also made aware of [facility].   98.4

## 2024-03-15 NOTE — H&P PST ADULT - BMI (KG/M2)
Subjective   The patient is here with complaint of some intermittent nausea vomiting sore throat symptoms since yesterday denies eating anything, steak chicken  or foods that would've caused food bolus,.. No bloody or bilious emesis sore throat symptoms reported patient can drink water but states it hurts the back of his throat  Endorses some abdominal pains no diarrhea no chest pain no shortness of air no fevers or chills symptoms onset yesterday evening        History provided by:  Patient      Review of Systems   Constitutional: Positive for appetite change. Negative for fever.   HENT: Positive for sore throat and trouble swallowing.    Eyes: Negative.    Respiratory: Negative.    Cardiovascular: Negative.    Gastrointestinal: Positive for abdominal pain, nausea and vomiting.   Genitourinary: Negative.    Musculoskeletal: Negative.    Skin: Negative.    Neurological: Negative.    Psychiatric/Behavioral: The patient is nervous/anxious.    All other systems reviewed and are negative.      History reviewed. No pertinent past medical history.    No Known Allergies    History reviewed. No pertinent surgical history.    History reviewed. No pertinent family history.    Social History     Socioeconomic History   • Marital status: Single     Spouse name: Not on file   • Number of children: Not on file   • Years of education: Not on file   • Highest education level: Not on file   Tobacco Use   • Smoking status: Never Smoker   Substance and Sexual Activity   • Alcohol use: No   • Drug use: No           Objective   Physical Exam   Constitutional: He is oriented to person, place, and time. He appears well-developed and well-nourished. No distress.   Afebrile nontoxic no acute distress   HENT:   Head: Normocephalic and atraumatic.   Mouth/Throat: No oropharyngeal exudate.   Slight erythema posterior pharynx//uvula is midline no airway compromise no abscess   Eyes: Conjunctivae and EOM are normal. Pupils are equal, round, and  "reactive to light.   Neck: Normal range of motion. Neck supple. No tracheal deviation present.   No fullness no lymphadenopathy   Cardiovascular: Normal rate, regular rhythm and intact distal pulses.   Pulmonary/Chest: Effort normal.   Abdominal: Soft. Bowel sounds are normal. There is tenderness.   Mild tenderness epigastrium no rebound or guarding   Musculoskeletal: Normal range of motion.   Lymphadenopathy:     He has no cervical adenopathy.   Neurological: He is alert and oriented to person, place, and time. No cranial nerve deficit or sensory deficit. He exhibits normal muscle tone. Coordination normal.   Skin: Skin is warm and dry. Capillary refill takes less than 2 seconds. He is not diaphoretic.   Psychiatric: His behavior is normal. Judgment and thought content normal.   Somewhat anxious   Nursing note and vitals reviewed.      Procedures           ED Course  ED Course as of Nov 10 1448   Sat Nov 10, 2018   1112 Patient observed drinking water no regurgitation but states it does hurt his throat  [SC]   1112 Patient case and management reviewed with Dr. Vazquez  [SC]   1304 Patient drinking water again no regurgitation states it feels \"a little better\"  [SC]   1347 Patient resting comfortably no acute distress  [SC]   1437 Patient sitting up drinking fluids no distress.    States his throat does feel better patient's case reviewed Dr Vazquez.//We'll plan on discharge home close follow-up if he develops problems with swallowing or worsening pain fevers or concerns return to the emergency room as well.  Patient states his abdomen feels better and has not had any nausea vomiting here  [SC]      ED Course User Index  [SC] Rad Bernal, ALISA                  MDM  Number of Diagnoses or Management Options     Amount and/or Complexity of Data Reviewed  Review and summarize past medical records: yes  Discuss the patient with other providers: yes    Risk of Complications, Morbidity, and/or " Mortality  Presenting problems: low  Diagnostic procedures: low  Management options: low          Final diagnoses:   Non-intractable vomiting with nausea, unspecified vomiting type   Pharyngitis, unspecified etiology            Rad Bernal, ALISA  11/10/18 1449     34.4

## 2024-03-15 NOTE — H&P PST ADULT - HISTORY OF PRESENT ILLNESS
57 y/o male who present with   Afib, HTN, AMY, obesity. Reports hand pain, numbness and tingling, he wore a brace with relief. He states that 6 weeks ago the symptoms returned. He states the pain wakes  him from sleep. He is not taking any medication for pain. He is scheduled for Right carpal tunnel release on 03/22/2024

## 2024-03-15 NOTE — H&P PST ADULT - NEGATIVE GASTROINTESTINAL SYMPTOMS
no nausea/no vomiting/no diarrhea/no constipation/no change in bowel habits/no flatulence/no abdominal pain
10-Sep-2018 23:00

## 2024-03-21 ENCOUNTER — TRANSCRIPTION ENCOUNTER (OUTPATIENT)
Age: 59
End: 2024-03-21

## 2024-03-21 NOTE — ASSESSMENT
[Patient Optimized for Surgery] : Patient optimized for surgery [As per surgery] : as per surgery [No Further Testing Recommended] : no further testing recommended [Modify anticoagulant treatment prior to procedure] : Modify anticoagulant treatment prior to procedure [FreeTextEntry4] : I had the pleasure of seeing Mr. Smart today for a preop medical clearance evaluation.  Patient is medically stable and optimized for the proposed surgery. Please see my note below for details. If you should have any questions, please do not hesitate to contact me.    Sincerely,  Kimberly Dudley M.D.  [FreeTextEntry5] : per surgeon

## 2024-03-21 NOTE — HISTORY OF PRESENT ILLNESS
[Atrial Fibrillation] : atrial fibrillation [Coronary Artery Disease] : coronary artery disease [Sleep Apnea] : sleep apnea [No Adverse Anesthesia Reaction] : no adverse anesthesia reaction in self or family member [Good (7-10 METs)] : Good (7-10 METs) [Chronic Anticoagulation] : chronic anticoagulation [(Patient denies any chest pain, claudication, dyspnea on exertion, orthopnea, palpitations or syncope)] : Patient denies any chest pain, claudication, dyspnea on exertion, orthopnea, palpitations or syncope [Aortic Stenosis] : no aortic stenosis [Recent Myocardial Infarction] : no recent myocardial infarction [Asthma] : no asthma [COPD] : no COPD [Smoker] : not a smoker [Chronic Kidney Disease] : no chronic kidney disease [Diabetes] : no diabetes [FreeTextEntry1] : Carpal tunnel decompression [FreeTextEntry2] : 3/22/2024 [FreeTextEntry3] : Dr. Pan Davenport [FreeTextEntry4] : Patient presents for preop evaluation for above procedure.  Feels overall well with no acute complaints. Has chronic R hand pain related to carpal tunnel - but otherwise feels well.

## 2024-03-22 ENCOUNTER — OUTPATIENT (OUTPATIENT)
Dept: OUTPATIENT SERVICES | Facility: HOSPITAL | Age: 59
LOS: 1 days | End: 2024-03-22
Payer: COMMERCIAL

## 2024-03-22 ENCOUNTER — TRANSCRIPTION ENCOUNTER (OUTPATIENT)
Age: 59
End: 2024-03-22

## 2024-03-22 ENCOUNTER — APPOINTMENT (OUTPATIENT)
Dept: ORTHOPEDIC SURGERY | Facility: HOSPITAL | Age: 59
End: 2024-03-22

## 2024-03-22 VITALS — OXYGEN SATURATION: 95 % | RESPIRATION RATE: 15 BRPM

## 2024-03-22 VITALS
SYSTOLIC BLOOD PRESSURE: 142 MMHG | DIASTOLIC BLOOD PRESSURE: 93 MMHG | WEIGHT: 219.58 LBS | HEIGHT: 68 IN | OXYGEN SATURATION: 98 % | TEMPERATURE: 96 F | RESPIRATION RATE: 16 BRPM | HEART RATE: 81 BPM

## 2024-03-22 DIAGNOSIS — Z98.890 OTHER SPECIFIED POSTPROCEDURAL STATES: Chronic | ICD-10-CM

## 2024-03-22 DIAGNOSIS — G56.03 CARPAL TUNNEL SYNDROME, BILATERAL UPPER LIMBS: ICD-10-CM

## 2024-03-22 DIAGNOSIS — Z98.89 OTHER SPECIFIED POSTPROCEDURAL STATES: Chronic | ICD-10-CM

## 2024-03-22 DIAGNOSIS — Z01.818 ENCOUNTER FOR OTHER PREPROCEDURAL EXAMINATION: ICD-10-CM

## 2024-03-22 PROBLEM — G47.33 OBSTRUCTIVE SLEEP APNEA (ADULT) (PEDIATRIC): Chronic | Status: ACTIVE | Noted: 2024-03-15

## 2024-03-22 PROBLEM — R00.2 PALPITATIONS: Chronic | Status: ACTIVE | Noted: 2024-03-15

## 2024-03-22 PROBLEM — I48.91 UNSPECIFIED ATRIAL FIBRILLATION: Chronic | Status: ACTIVE | Noted: 2024-03-15

## 2024-03-22 PROCEDURE — 64721 CARPAL TUNNEL SURGERY: CPT | Mod: RT

## 2024-03-22 PROCEDURE — ZZZZZ: CPT

## 2024-03-22 RX ORDER — SODIUM CHLORIDE 9 MG/ML
1000 INJECTION, SOLUTION INTRAVENOUS
Refills: 0 | Status: DISCONTINUED | OUTPATIENT
Start: 2024-03-22 | End: 2024-03-22

## 2024-03-22 RX ORDER — ONDANSETRON 8 MG/1
4 TABLET, FILM COATED ORAL ONCE
Refills: 0 | Status: DISCONTINUED | OUTPATIENT
Start: 2024-03-22 | End: 2024-03-22

## 2024-03-22 RX ORDER — CHLORHEXIDINE GLUCONATE 213 G/1000ML
1 SOLUTION TOPICAL ONCE
Refills: 0 | Status: COMPLETED | OUTPATIENT
Start: 2024-03-22 | End: 2024-03-22

## 2024-03-22 RX ORDER — ATORVASTATIN CALCIUM 80 MG/1
1 TABLET, FILM COATED ORAL
Refills: 0 | DISCHARGE

## 2024-03-22 RX ORDER — AMLODIPINE BESYLATE 2.5 MG/1
1 TABLET ORAL
Refills: 0 | DISCHARGE

## 2024-03-22 RX ORDER — CLOPIDOGREL BISULFATE 75 MG/1
1 TABLET, FILM COATED ORAL
Refills: 0 | DISCHARGE

## 2024-03-22 RX ORDER — COLCHICINE 0.6 MG
1 TABLET ORAL
Refills: 0 | DISCHARGE

## 2024-03-22 RX ORDER — IBUPROFEN 200 MG
1 TABLET ORAL
Qty: 30 | Refills: 0
Start: 2024-03-22

## 2024-03-22 RX ORDER — EZETIMIBE 10 MG/1
1 TABLET ORAL
Refills: 0 | DISCHARGE

## 2024-03-22 RX ORDER — OXYCODONE HYDROCHLORIDE 5 MG/1
5 TABLET ORAL ONCE
Refills: 0 | Status: DISCONTINUED | OUTPATIENT
Start: 2024-03-22 | End: 2024-03-22

## 2024-03-22 RX ORDER — PANTOPRAZOLE SODIUM 20 MG/1
1 TABLET, DELAYED RELEASE ORAL
Qty: 0 | Refills: 0 | DISCHARGE

## 2024-03-22 RX ORDER — APREPITANT 80 MG/1
40 CAPSULE ORAL ONCE
Refills: 0 | Status: COMPLETED | OUTPATIENT
Start: 2024-03-22 | End: 2024-03-22

## 2024-03-22 RX ORDER — CEFAZOLIN SODIUM 1 G
2000 VIAL (EA) INJECTION ONCE
Refills: 0 | Status: COMPLETED | OUTPATIENT
Start: 2024-03-22 | End: 2024-03-22

## 2024-03-22 RX ORDER — OXYCODONE AND ACETAMINOPHEN 5; 325 MG/1; MG/1
1 TABLET ORAL
Qty: 5 | Refills: 0
Start: 2024-03-22

## 2024-03-22 RX ADMIN — APREPITANT 40 MILLIGRAM(S): 80 CAPSULE ORAL at 06:45

## 2024-03-22 RX ADMIN — CHLORHEXIDINE GLUCONATE 1 APPLICATION(S): 213 SOLUTION TOPICAL at 06:46

## 2024-03-22 NOTE — ASU DISCHARGE PLAN (ADULT/PEDIATRIC) - CARE PROVIDER_API CALL
Pan Davenport  Orthopaedic Surgery  [address]  [place] [address]  Phone: [phone]  Fax: [phone]  Follow Up Time:

## 2024-03-22 NOTE — BRIEF OPERATIVE NOTE - CONDITION POST OP
RN referral received as pt wanting to improve nutrition post breast cancer, pt will have last radiation treatment on Friday 1/27. Called and spoke with pt this afternoon. Pt with many nutrition questions in regards to being prediabetic, carbohydrate counting and what foods she should be eating & avoiding to prevent cancer from coming back.    Pt reports she is trying to \"eat sensible\" and lose weight, started limiting/avoiding highly processed foods but does indulge in treats occasionally. Also starting to be more physically active, pt reports she is only cleared to walk at this time so she has been walking when able. Pt expressed she would like to meet with dietitian to discuss nutrition recommendations and receive written materials.    Pt was in agreement to an appointment with dietitian on Friday, February 3rd at 11:30 am in the [facility].   stable

## 2024-03-22 NOTE — ASU DISCHARGE PLAN (ADULT/PEDIATRIC) - ACTIVITY LEVEL
Form received at [facility] on 3/31/2023.     Due to very high form volumes, the Forms Completion team estimates forms may take longer than our usual 14 business day turnaround goal.    Authorization emailed.     sling for comfort/Elevate extremity

## 2024-03-22 NOTE — ASU DISCHARGE PLAN (ADULT/PEDIATRIC) - ASU DC SPECIAL INSTRUCTIONSFT
Elevate  Right  arm in sling daily when up & walking.  Elevate the  hand/arm above heart level on pillow/blankets when lying down.  Apply ice packs to top of   Right  hand for 20 min on and off  Keep bandage clean, dry , & intact until seen in office  May open & close the fingers of the operated arm every hour for exercise.  Call the Dr.  for fever, severe pain, fall or hand injury.  Call for an appointment for office visit  in  10 -14 days.

## 2024-03-22 NOTE — ASU DISCHARGE PLAN (ADULT/PEDIATRIC) - PAIN MANAGEMENT
Prescriptions electronically submitted to pharmacy from [facility]  [id number]/Prescriptions electronically submitted to pharmacy from [facility]

## 2024-03-22 NOTE — ASU PATIENT PROFILE, ADULT - NSICDXPASTMEDICALHX_GEN_ALL_CORE_FT
PAST MEDICAL HISTORY:  A-fib     Abdominal mass     Anxiety     Atrial fibrillation status post cardioversion     Carpal tunnel syndrome     Chronic GERD     HTN (hypertension)     Mixed hyperlipidemia     Obesity     AMY (obstructive sleep apnea)     Palpitation     S/P ablation of atrial flutter

## 2024-04-04 ENCOUNTER — APPOINTMENT (OUTPATIENT)
Dept: ORTHOPEDIC SURGERY | Facility: CLINIC | Age: 59
End: 2024-04-04

## 2024-04-18 ENCOUNTER — APPOINTMENT (OUTPATIENT)
Dept: ORTHOPEDIC SURGERY | Facility: CLINIC | Age: 59
End: 2024-04-18
Payer: COMMERCIAL

## 2024-04-18 VITALS — HEIGHT: 68 IN | BODY MASS INDEX: 34.1 KG/M2 | WEIGHT: 225 LBS

## 2024-04-18 DIAGNOSIS — G56.03 CARPAL TUNNEL SYNDROM,BILATERAL UPPER LIMBS: ICD-10-CM

## 2024-04-18 PROBLEM — E66.9 OBESITY, UNSPECIFIED: Chronic | Status: ACTIVE | Noted: 2024-03-15

## 2024-04-18 PROBLEM — F41.9 ANXIETY DISORDER, UNSPECIFIED: Chronic | Status: ACTIVE | Noted: 2024-03-15

## 2024-04-18 PROBLEM — Z98.890 OTHER SPECIFIED POSTPROCEDURAL STATES: Chronic | Status: ACTIVE | Noted: 2024-03-15

## 2024-04-18 PROBLEM — G56.00 CARPAL TUNNEL SYNDROME, UNSPECIFIED UPPER LIMB: Chronic | Status: ACTIVE | Noted: 2024-03-15

## 2024-04-18 PROCEDURE — 99024 POSTOP FOLLOW-UP VISIT: CPT

## 2024-04-18 NOTE — HISTORY OF PRESENT ILLNESS
[de-identified] : 1st POA s/p Right Carpal Tunnel Release [de-identified] : The patient is a 58 year male who returns for the 1st postoperative visit after undergoing Right Carpal Tunnel Release at [facility]. The surgery was on 03/22/2024. The patient is recovering at home. He reports mild postoperative pain. He wasn't able to come back 2 weeks later and he had the sutures removed by another doctor.  [de-identified] :  Patient is WDWN, alert, and in no acute distress. Breathing is unlabored. He is grossly oriented to person, place, and time.   Right Hand:   Incision is healing well. There are no signs of infection. Normal amount of postoperative edema and tenderness present. [de-identified] :  The underlying pathophysiology was reviewed with the patient. XR films were reviewed with the patient. Discussed at length the nature of the patient's condition.   Patient was advised to take OTC medications and topical analgesic for pain management.  The patient was instructed to keep the incision site dry. They may then begin to massage the scar with vitamin E oil. Gentle range of motion, stretching, and strengthening exercises were encouraged. Patient should actively work on gradually increasing use of the hand, as tolerated.  For the left hand he was informed of the option of cortisone injection if the symptom of CT worsens.    All questions answered, understanding verbalized. Patient in agreement with plan of care.   Patient was advised to follow up as needed.

## 2024-04-18 NOTE — END OF VISIT
[FreeTextEntry3] :  All medical record entries made by the Scribe were at my,  Dr. Pan Davenport MD., direction and personally dictated by me on 04/18/2024. I have personally reviewed the chart and agree that the record accurately reflects my personal performance of the history, physical exam, assessment and plan.

## 2024-04-18 NOTE — ADDENDUM
[FreeTextEntry1] :  I, Sánchez Bonilla wrote this note acting as a scribe for Dr. Pan Davenport on Apr 18, 2024.

## 2024-05-20 ENCOUNTER — RX RENEWAL (OUTPATIENT)
Age: 59
End: 2024-05-20

## 2024-05-20 ENCOUNTER — NON-APPOINTMENT (OUTPATIENT)
Age: 59
End: 2024-05-20

## 2024-05-20 ENCOUNTER — APPOINTMENT (OUTPATIENT)
Dept: CARDIOLOGY | Facility: CLINIC | Age: 59
End: 2024-05-20
Payer: COMMERCIAL

## 2024-05-20 VITALS
DIASTOLIC BLOOD PRESSURE: 80 MMHG | SYSTOLIC BLOOD PRESSURE: 120 MMHG | HEIGHT: 68 IN | HEART RATE: 82 BPM | BODY MASS INDEX: 33.49 KG/M2 | OXYGEN SATURATION: 96 % | WEIGHT: 221 LBS

## 2024-05-20 DIAGNOSIS — I48.0 PAROXYSMAL ATRIAL FIBRILLATION: ICD-10-CM

## 2024-05-20 DIAGNOSIS — I10 ESSENTIAL (PRIMARY) HYPERTENSION: ICD-10-CM

## 2024-05-20 PROCEDURE — 99214 OFFICE O/P EST MOD 30 MIN: CPT | Mod: 25

## 2024-05-20 PROCEDURE — G2211 COMPLEX E/M VISIT ADD ON: CPT | Mod: NC,1L

## 2024-05-20 PROCEDURE — 93000 ELECTROCARDIOGRAM COMPLETE: CPT

## 2024-05-20 RX ORDER — ATORVASTATIN CALCIUM 80 MG/1
80 TABLET, FILM COATED ORAL DAILY
Qty: 90 | Refills: 3 | Status: ACTIVE | COMMUNITY
Start: 2021-02-08 | End: 1900-01-01

## 2024-05-20 RX ORDER — CLOPIDOGREL BISULFATE 75 MG/1
75 TABLET, FILM COATED ORAL DAILY
Qty: 90 | Refills: 3 | Status: ACTIVE | COMMUNITY
Start: 2023-03-23 | End: 1900-01-01

## 2024-05-20 NOTE — PHYSICAL EXAM
[Well Developed] : well developed [Well Nourished] : well nourished [Normal Conjunctiva] : normal conjunctiva [Normal Venous Pressure] : normal venous pressure [No Murmur] : no murmur [Clear Lung Fields] : clear lung fields [Soft] : abdomen soft [Normal Gait] : normal gait [No Edema] : no edema [No Rash] : no rash [Moves all extremities] : moves all extremities [Alert and Oriented] : alert and oriented [Normal memory] : normal memory [de-identified] : Irregularly irregular heartbeat. [de-identified] : Anxious appearing

## 2024-05-20 NOTE — CARDIOLOGY SUMMARY
[de-identified] : March 20, 2023: There is a 25% distal left main stenosis.  The proximal LAD had a 60% stenosis (iFR was performed and deemed to be not obstructed blood flow) second diagonal had a 30% lesion.\par  Ostial circumflex had a 70% stenosis IFR was deemed to be nonobstructive a flow.\par  Right coronary artery had a proximal 30% stenosis, a mid 40% stenosis and the first RPL had a 50% stenosis.

## 2024-05-20 NOTE — HISTORY OF PRESENT ILLNESS
[FreeTextEntry1] : No bleeding or bruising. Surgery went well. No chest pain or dyspnea.  Prior: He has been feeling fine. No bleeding. but bruising during injuries. Off sotalol since Nov. Didnt tolerate metoprolol in the past.  Prior: Feels fatigued. He gained weight. Off sotalol since Dec 1. Has not been able to find a dentist in his plan to get a dental appliance.  Prior: tore left gastrox. No further exercise. He has been taking his medication without difficulty.  No bleeding or bruising aside from the injury to his leg. He reports waking up with headaches on occasions.  Prior: s/p ablation no won sotalol 80 bid. doing well. No palpitaiions or chest pain Walks 2-3 miles without difficulty.  Prior: Had sudden diaphoresis and nausea. taken to the ER. Was in Afl with RVR. Started on Sotalol and monitored for 3 days. No further episodes. Feels okay overall.  Prior: s/p AF ablation 5/26/2023. He feels okay. Taking toprol 50 qd. No chest pain or dyspnea.  Prior: s/p CHRISTIANO cardioversion. Doing well. No bleeding or bruising on his current medical regimen. He denies any exertional chest pains or shortness of breath though he is not performing routine aerobic activity.  He is fairly active as a  at work.   Prior: Coronary angio: Moderate diffuse disease. See report. NO angina. Wants to know  Prior: Dear Sharon, Thank you for referring him for cardiovascular evaluation.  He is a 57-year-old with a history of paroxysmal atrial fibrillation and hypertension who presented to your office with sweating, jitteriness and was noted to be in atrial fibrillation with a rapid ventricular response. He reports that approximately 2 weeks ago his work time schedule was changed drastically and he was under severe stress. He started reporting feeling shaky and jittery as well as some shortness of breath. He had no chest pains, orthopnea, PND or leg edema. When he was seen in your office he was noted to be in atrial fibrillation with a rapid ventricular response. His previous atrial fibrillation episode occurred in the setting of the operating room and resolved with beta-blockade. He has a history of hypertension, GERD and an intestinal mass that is being monitored with annual MRI which has reportedly not grown for the past 2 years. He has a history of snoring. He has no history of coronary artery disease, diabetes mellitus, stroke, renal insufficiency or congestive heart failure.

## 2024-05-20 NOTE — DISCUSSION/SUMMARY
[FreeTextEntry1] : He is a 58-year-old with hypertension, GERD, Nonobstructive coronary artery disease and episodes of Symptomatic atrial flutter/fibrillation. Now in SR. ECG: NSR, RBBB CAD: No anginal symptoms.  He is tolerating aggressive medical therapy with Plavix and colchicine. LDL was 51. Which is excellet. Continue high-dose statin And Zetia with a goal LDL less than 55 mg/dL.  Reviewed the need for antiplatet agent in additon to noac. (plavix chosen for GI and efficacy) A-fib: Now in sinus rhythm s/p acute inflammatory episode post ablation. ECG is normal now. Continue oral anticoagulation for stroke risk reduction.   HTN: Borderline control. Continue Norvasc 5 mg. AMY diagnosed. Followup with  Dr. Suárez.    follow-up in 6 weeks. [EKG obtained to assist in diagnosis and management of assessed problem(s)] : EKG obtained to assist in diagnosis and management of assessed problem(s)

## 2024-05-21 ENCOUNTER — RX RENEWAL (OUTPATIENT)
Age: 59
End: 2024-05-21

## 2024-05-21 RX ORDER — COLCHICINE 0.6 MG/1
0.6 TABLET ORAL
Qty: 90 | Refills: 3 | Status: ACTIVE | COMMUNITY
Start: 2023-03-23 | End: 1900-01-01

## 2024-05-31 RX ORDER — SILDENAFIL 100 MG/1
100 TABLET, FILM COATED ORAL
Qty: 30 | Refills: 0 | Status: ACTIVE | COMMUNITY
Start: 2018-04-13 | End: 1900-01-01

## 2024-06-03 ENCOUNTER — APPOINTMENT (OUTPATIENT)
Dept: ORTHOPEDIC SURGERY | Facility: CLINIC | Age: 59
End: 2024-06-03
Payer: COMMERCIAL

## 2024-06-03 VITALS — HEIGHT: 68 IN | BODY MASS INDEX: 33.49 KG/M2 | WEIGHT: 221 LBS

## 2024-06-03 DIAGNOSIS — G56.00 CARPAL TUNNEL SYNDROME, UNSPECIFIED UPPER LIMB: ICD-10-CM

## 2024-06-03 PROCEDURE — 99024 POSTOP FOLLOW-UP VISIT: CPT

## 2024-06-03 RX ORDER — IBUPROFEN 600 MG/1
600 TABLET, FILM COATED ORAL
Qty: 60 | Refills: 2 | Status: ACTIVE | COMMUNITY
Start: 2024-06-03 | End: 1900-01-01

## 2024-06-03 RX ORDER — METHYLPREDNISOLONE 4 MG/1
4 TABLET ORAL
Qty: 1 | Refills: 0 | Status: ACTIVE | COMMUNITY
Start: 2024-06-03 | End: 1900-01-01

## 2024-06-03 NOTE — ADDENDUM
[FreeTextEntry1] :  I, Sánchez Bonilla wrote this note acting as a scribe for Dr. Pan Davenport on Jun 03, 2024.

## 2024-06-03 NOTE — HISTORY OF PRESENT ILLNESS
[de-identified] : 2nd POA s/p Right Carpal Tunnel Release [de-identified] : The patient is a 58 year male who returns for the 2nd postoperative visit after undergoing Right Carpal Tunnel Release at [facility]. The surgery was on 03/22/2024. The patient is recovering at home. He reports mild postoperative pain. He wasn't able to come back 2 weeks later and he had the sutures removed by another doctor.  [de-identified] :  Patient is WDWN, alert, and in no acute distress. Breathing is unlabored. He is grossly oriented to person, place, and time.   Right Hand:   Incision is healing well. There are no signs of infection. Normal amount of postoperative edema and tenderness present. [de-identified] :  The underlying pathophysiology was reviewed with the patient. XR films were reviewed with the patient. Discussed at length the nature of the patient's condition.   Patient was advised to take OTC medications and topical analgesic for pain management.  RX: Medrol Pack and ibuprofen 600mg  All questions answered, understanding verbalized. Patient in agreement with plan of care.   Patient was advised to follow up in 6 weeks or as needed..

## 2024-06-14 ENCOUNTER — RX RENEWAL (OUTPATIENT)
Age: 59
End: 2024-06-14

## 2024-06-14 RX ORDER — EZETIMIBE 10 MG/1
10 TABLET ORAL
Qty: 90 | Refills: 3 | Status: ACTIVE | COMMUNITY
Start: 2023-07-28 | End: 1900-01-01

## 2024-06-27 ENCOUNTER — APPOINTMENT (OUTPATIENT)
Dept: MRI IMAGING | Facility: CLINIC | Age: 59
End: 2024-06-27

## 2024-07-08 ENCOUNTER — APPOINTMENT (OUTPATIENT)
Dept: ORTHOPEDIC SURGERY | Facility: CLINIC | Age: 59
End: 2024-07-08
Payer: COMMERCIAL

## 2024-07-08 VITALS — BODY MASS INDEX: 33.49 KG/M2 | HEIGHT: 68 IN | WEIGHT: 221 LBS

## 2024-07-08 DIAGNOSIS — G56.03 CARPAL TUNNEL SYNDROM,BILATERAL UPPER LIMBS: ICD-10-CM

## 2024-07-08 PROCEDURE — 99213 OFFICE O/P EST LOW 20 MIN: CPT

## 2024-08-06 ENCOUNTER — APPOINTMENT (OUTPATIENT)
Dept: ELECTROPHYSIOLOGY | Facility: CLINIC | Age: 59
End: 2024-08-06

## 2024-08-23 ENCOUNTER — RX RENEWAL (OUTPATIENT)
Age: 59
End: 2024-08-23

## 2024-08-27 ENCOUNTER — RX RENEWAL (OUTPATIENT)
Age: 59
End: 2024-08-27

## 2024-09-11 ENCOUNTER — NON-APPOINTMENT (OUTPATIENT)
Age: 59
End: 2024-09-11

## 2024-09-23 ENCOUNTER — APPOINTMENT (OUTPATIENT)
Dept: CARDIOLOGY | Facility: CLINIC | Age: 59
End: 2024-09-23
Payer: COMMERCIAL

## 2024-09-23 ENCOUNTER — NON-APPOINTMENT (OUTPATIENT)
Age: 59
End: 2024-09-23

## 2024-09-23 VITALS
OXYGEN SATURATION: 96 % | HEIGHT: 68 IN | WEIGHT: 221 LBS | BODY MASS INDEX: 33.49 KG/M2 | DIASTOLIC BLOOD PRESSURE: 88 MMHG | HEART RATE: 84 BPM | SYSTOLIC BLOOD PRESSURE: 126 MMHG

## 2024-09-23 DIAGNOSIS — I48.0 PAROXYSMAL ATRIAL FIBRILLATION: ICD-10-CM

## 2024-09-23 DIAGNOSIS — I10 ESSENTIAL (PRIMARY) HYPERTENSION: ICD-10-CM

## 2024-09-23 PROCEDURE — G2211 COMPLEX E/M VISIT ADD ON: CPT

## 2024-09-23 PROCEDURE — 99214 OFFICE O/P EST MOD 30 MIN: CPT

## 2024-09-23 PROCEDURE — 93000 ELECTROCARDIOGRAM COMPLETE: CPT

## 2024-09-23 NOTE — CARDIOLOGY SUMMARY
[de-identified] : March 20, 2023: There is a 25% distal left main stenosis.  The proximal LAD had a 60% stenosis (iFR was performed and deemed to be not obstructed blood flow) second diagonal had a 30% lesion.\par  Ostial circumflex had a 70% stenosis IFR was deemed to be nonobstructive a flow.\par  Right coronary artery had a proximal 30% stenosis, a mid 40% stenosis and the first RPL had a 50% stenosis.

## 2024-09-23 NOTE — PHYSICAL EXAM
[Well Developed] : well developed [Well Nourished] : well nourished [Normal Conjunctiva] : normal conjunctiva [Normal Venous Pressure] : normal venous pressure [No Murmur] : no murmur [Clear Lung Fields] : clear lung fields [Soft] : abdomen soft [Normal Gait] : normal gait [No Edema] : no edema [No Rash] : no rash [Moves all extremities] : moves all extremities [Alert and Oriented] : alert and oriented [Normal memory] : normal memory [de-identified] : Irregularly irregular heartbeat. [de-identified] : Anxious appearing

## 2024-09-23 NOTE — DISCUSSION/SUMMARY
[FreeTextEntry1] : He is a 58-year-old with hypertension, GERD, Nonobstructive coronary artery disease and episodes of Symptomatic atrial flutter/fibrillation. Now in SR. ECG: NSR,  CAD: No anginal symptoms.  He is tolerating aggressive medical therapy with Plavix and colchicine. LDL was 51. Which is excellent. Continue high-dose statin, Zetia with a goal LDL less than 55 mg/dL.  Reviewed the need for antiplatet agent in additon to noac. (plavix chosen for GI and efficacy) A-fib: Now in sinus rhythm s/p acute inflammatory episode post ablation. ECG is normal now. Continue oral anticoagulation for stroke risk reduction.   HTN: adequate control. Continue Norvasc 5 mg. AMY diagnosed. Followup with sleep. applied for dental appliance first. follow-up in 6 weeks. [EKG obtained to assist in diagnosis and management of assessed problem(s)] : EKG obtained to assist in diagnosis and management of assessed problem(s)

## 2024-09-23 NOTE — HISTORY OF PRESENT ILLNESS
[FreeTextEntry1] : No routien aerobic activity. Planning to return to gym. no bleeding or bruising. LDL 51 A1c 5.6  Prior: No bleeding or bruising. Surgery went well. No chest pain or dyspnea.  Prior: He has been feeling fine. No bleeding. but bruising during injuries. Off sotalol since Nov. Didnt tolerate metoprolol in the past.  Prior: Feels fatigued. He gained weight. Off sotalol since Dec 1. Has not been able to find a dentist in his plan to get a dental appliance.  Prior: tore left gastrox. No further exercise. He has been taking his medication without difficulty.  No bleeding or bruising aside from the injury to his leg. He reports waking up with headaches on occasions.  Prior: s/p ablation no won sotalol 80 bid. doing well. No palpitaiions or chest pain Walks 2-3 miles without difficulty.  Prior: Had sudden diaphoresis and nausea. taken to the ER. Was in Afl with RVR. Started on Sotalol and monitored for 3 days. No further episodes. Feels okay overall.  Prior: s/p AF ablation 5/26/2023. He feels okay. Taking toprol 50 qd. No chest pain or dyspnea.  Prior: s/p CHRISTIANO cardioversion. Doing well. No bleeding or bruising on his current medical regimen. He denies any exertional chest pains or shortness of breath though he is not performing routine aerobic activity.  He is fairly active as a  at work.   Prior: Coronary angio: Moderate diffuse disease. See report. NO angina. Wants to know  Prior: Dear Sharon, Thank you for referring him for cardiovascular evaluation.  He is a 57-year-old with a history of paroxysmal atrial fibrillation and hypertension who presented to your office with sweating, jitteriness and was noted to be in atrial fibrillation with a rapid ventricular response. He reports that approximately 2 weeks ago his work time schedule was changed drastically and he was under severe stress. He started reporting feeling shaky and jittery as well as some shortness of breath. He had no chest pains, orthopnea, PND or leg edema. When he was seen in your office he was noted to be in atrial fibrillation with a rapid ventricular response. His previous atrial fibrillation episode occurred in the setting of the operating room and resolved with beta-blockade. He has a history of hypertension, GERD and an intestinal mass that is being monitored with annual MRI which has reportedly not grown for the past 2 years. He has a history of snoring. He has no history of coronary artery disease, diabetes mellitus, stroke, renal insufficiency or congestive heart failure.

## 2024-10-22 ENCOUNTER — APPOINTMENT (OUTPATIENT)
Dept: ELECTROPHYSIOLOGY | Facility: CLINIC | Age: 59
End: 2024-10-22

## 2024-11-01 ENCOUNTER — RX RENEWAL (OUTPATIENT)
Age: 59
End: 2024-11-01

## 2024-11-05 ENCOUNTER — APPOINTMENT (OUTPATIENT)
Dept: ORTHOPEDIC SURGERY | Facility: CLINIC | Age: 59
End: 2024-11-05
Payer: COMMERCIAL

## 2024-11-05 VITALS
WEIGHT: 215 LBS | HEIGHT: 68 IN | BODY MASS INDEX: 32.58 KG/M2 | SYSTOLIC BLOOD PRESSURE: 143 MMHG | DIASTOLIC BLOOD PRESSURE: 88 MMHG | HEART RATE: 96 BPM

## 2024-11-05 DIAGNOSIS — M21.42 FLAT FOOT [PES PLANUS] (ACQUIRED), LEFT FOOT: ICD-10-CM

## 2024-11-05 DIAGNOSIS — M72.2 PLANTAR FASCIAL FIBROMATOSIS: ICD-10-CM

## 2024-11-05 DIAGNOSIS — M62.462 CONTRACTURE OF MUSCLE, LEFT LOWER LEG: ICD-10-CM

## 2024-11-05 DIAGNOSIS — M77.8 OTHER ENTHESOPATHIES, NOT ELSEWHERE CLASSIFIED: ICD-10-CM

## 2024-11-05 DIAGNOSIS — M79.672 PAIN IN LEFT FOOT: ICD-10-CM

## 2024-11-05 PROCEDURE — 73630 X-RAY EXAM OF FOOT: CPT | Mod: LT

## 2024-11-05 PROCEDURE — 99214 OFFICE O/P EST MOD 30 MIN: CPT

## 2024-11-18 NOTE — ASU PATIENT PROFILE, ADULT - NS SC CAGE ALCOHOL CUT DOWN
----- Message from Dr. Fito Garcia MD sent at 11/18/2024 12:39 PM EST -----  Needs EKG in 2 weeks in [place] office not sure if they scheduled him  
Patient notified and scheduled for EKG [place] office on 12/6/24 per Dr. Garcia.  
no

## 2024-12-03 ENCOUNTER — APPOINTMENT (OUTPATIENT)
Dept: ELECTROPHYSIOLOGY | Facility: CLINIC | Age: 59
End: 2024-12-03
Payer: COMMERCIAL

## 2024-12-03 ENCOUNTER — APPOINTMENT (OUTPATIENT)
Dept: ELECTROPHYSIOLOGY | Facility: CLINIC | Age: 59
End: 2024-12-03

## 2024-12-03 ENCOUNTER — NON-APPOINTMENT (OUTPATIENT)
Age: 59
End: 2024-12-03

## 2024-12-03 VITALS — HEART RATE: 85 BPM | SYSTOLIC BLOOD PRESSURE: 121 MMHG | OXYGEN SATURATION: 97 % | DIASTOLIC BLOOD PRESSURE: 86 MMHG

## 2024-12-03 DIAGNOSIS — I48.91 UNSPECIFIED ATRIAL FIBRILLATION: ICD-10-CM

## 2024-12-03 PROCEDURE — 99213 OFFICE O/P EST LOW 20 MIN: CPT

## 2024-12-03 PROCEDURE — 93000 ELECTROCARDIOGRAM COMPLETE: CPT

## 2024-12-12 ENCOUNTER — APPOINTMENT (OUTPATIENT)
Dept: PULMONOLOGY | Facility: CLINIC | Age: 59
End: 2024-12-12

## 2024-12-17 ENCOUNTER — APPOINTMENT (OUTPATIENT)
Dept: ORTHOPEDIC SURGERY | Facility: CLINIC | Age: 59
End: 2024-12-17
Payer: COMMERCIAL

## 2024-12-17 DIAGNOSIS — M21.42 FLAT FOOT [PES PLANUS] (ACQUIRED), LEFT FOOT: ICD-10-CM

## 2024-12-17 DIAGNOSIS — M72.2 PLANTAR FASCIAL FIBROMATOSIS: ICD-10-CM

## 2024-12-17 DIAGNOSIS — M77.8 OTHER ENTHESOPATHIES, NOT ELSEWHERE CLASSIFIED: ICD-10-CM

## 2024-12-17 DIAGNOSIS — M62.462 CONTRACTURE OF MUSCLE, LEFT LOWER LEG: ICD-10-CM

## 2024-12-17 PROCEDURE — 99214 OFFICE O/P EST MOD 30 MIN: CPT | Mod: 25

## 2024-12-17 PROCEDURE — 20550 NJX 1 TENDON SHEATH/LIGAMENT: CPT | Mod: LT

## 2024-12-23 NOTE — PATIENT PROFILE ADULT - NSPROPOAURINARYCATHETER_GEN_A_NUR
1.  Start methotrexate 5tablets a week (12.5mg) and folic acid 1mg a day   2. Rest left shoulder  x 3 days   3.  Cont. Legal medical marijuana.   4.  Return to clinic in 3- months as needed.   5. Labs in 3 months -    no

## 2024-12-27 PROCEDURE — 93248 EXT ECG>7D<15D REV&INTERPJ: CPT

## 2024-12-31 ENCOUNTER — TRANSCRIPTION ENCOUNTER (OUTPATIENT)
Age: 59
End: 2024-12-31

## 2025-01-02 ENCOUNTER — TRANSCRIPTION ENCOUNTER (OUTPATIENT)
Age: 60
End: 2025-01-02

## 2025-01-05 LAB
25(OH)D3 SERPL-MCNC: 30.7 NG/ML
ALBUMIN SERPL ELPH-MCNC: 5.1 G/DL
ALP BLD-CCNC: 130 U/L
ALT SERPL-CCNC: 97 U/L
ANION GAP SERPL CALC-SCNC: 15 MMOL/L
AST SERPL-CCNC: 60 U/L
BASOPHILS # BLD AUTO: 0.03 K/UL
BASOPHILS NFR BLD AUTO: 0.4 %
BILIRUB SERPL-MCNC: 0.9 MG/DL
BUN SERPL-MCNC: 20 MG/DL
CALCIUM SERPL-MCNC: 10.9 MG/DL
CHLORIDE SERPL-SCNC: 101 MMOL/L
CHOLEST SERPL-MCNC: 130 MG/DL
CO2 SERPL-SCNC: 27 MMOL/L
CREAT SERPL-MCNC: 1.12 MG/DL
EGFR: 76 ML/MIN/1.73M2
EOSINOPHIL # BLD AUTO: 0.16 K/UL
EOSINOPHIL NFR BLD AUTO: 2.2 %
ESTIMATED AVERAGE GLUCOSE: 123 MG/DL
GLUCOSE SERPL-MCNC: 99 MG/DL
HBA1C MFR BLD HPLC: 5.9 %
HCT VFR BLD CALC: 50.8 %
HDLC SERPL-MCNC: 39 MG/DL
HGB BLD-MCNC: 17.1 G/DL
IMM GRANULOCYTES NFR BLD AUTO: 0.5 %
LDLC SERPL CALC-MCNC: 50 MG/DL
LYMPHOCYTES # BLD AUTO: 1.76 K/UL
LYMPHOCYTES NFR BLD AUTO: 23.8 %
MAN DIFF?: NORMAL
MCHC RBC-ENTMCNC: 29.9 PG
MCHC RBC-ENTMCNC: 33.7 G/DL
MCV RBC AUTO: 89 FL
MONOCYTES # BLD AUTO: 0.6 K/UL
MONOCYTES NFR BLD AUTO: 8.1 %
NEUTROPHILS # BLD AUTO: 4.82 K/UL
NEUTROPHILS NFR BLD AUTO: 65 %
NONHDLC SERPL-MCNC: 91 MG/DL
PLATELET # BLD AUTO: 274 K/UL
POTASSIUM SERPL-SCNC: 4.7 MMOL/L
PROT SERPL-MCNC: 7.9 G/DL
RBC # BLD: 5.71 M/UL
RBC # FLD: 13.3 %
SODIUM SERPL-SCNC: 143 MMOL/L
TRIGL SERPL-MCNC: 263 MG/DL
TSH SERPL-ACNC: 1.12 UIU/ML
WBC # FLD AUTO: 7.41 K/UL

## 2025-01-08 ENCOUNTER — APPOINTMENT (OUTPATIENT)
Dept: INTERNAL MEDICINE | Facility: CLINIC | Age: 60
End: 2025-01-08
Payer: COMMERCIAL

## 2025-01-08 ENCOUNTER — TRANSCRIPTION ENCOUNTER (OUTPATIENT)
Age: 60
End: 2025-01-08

## 2025-01-08 ENCOUNTER — NON-APPOINTMENT (OUTPATIENT)
Age: 60
End: 2025-01-08

## 2025-01-08 VITALS
DIASTOLIC BLOOD PRESSURE: 63 MMHG | HEIGHT: 68 IN | HEART RATE: 93 BPM | WEIGHT: 225 LBS | SYSTOLIC BLOOD PRESSURE: 123 MMHG | OXYGEN SATURATION: 94 % | BODY MASS INDEX: 34.1 KG/M2

## 2025-01-08 DIAGNOSIS — I10 ESSENTIAL (PRIMARY) HYPERTENSION: ICD-10-CM

## 2025-01-08 DIAGNOSIS — I70.90 UNSPECIFIED ATHEROSCLEROSIS: ICD-10-CM

## 2025-01-08 DIAGNOSIS — A04.8 OTHER SPECIFIED BACTERIAL INTESTINAL INFECTIONS: ICD-10-CM

## 2025-01-08 DIAGNOSIS — E66.9 OBESITY, UNSPECIFIED: ICD-10-CM

## 2025-01-08 DIAGNOSIS — K21.9 GASTRO-ESOPHAGEAL REFLUX DISEASE W/OUT ESOPHAGITIS: ICD-10-CM

## 2025-01-08 DIAGNOSIS — I48.0 PAROXYSMAL ATRIAL FIBRILLATION: ICD-10-CM

## 2025-01-08 DIAGNOSIS — F41.8 OTHER SPECIFIED ANXIETY DISORDERS: ICD-10-CM

## 2025-01-08 DIAGNOSIS — E78.5 HYPERLIPIDEMIA, UNSPECIFIED: ICD-10-CM

## 2025-01-08 DIAGNOSIS — I48.91 UNSPECIFIED ATRIAL FIBRILLATION: ICD-10-CM

## 2025-01-08 PROCEDURE — 99214 OFFICE O/P EST MOD 30 MIN: CPT | Mod: 25

## 2025-01-08 PROCEDURE — 99396 PREV VISIT EST AGE 40-64: CPT

## 2025-01-08 PROCEDURE — G0444 DEPRESSION SCREEN ANNUAL: CPT | Mod: 59

## 2025-01-08 PROCEDURE — 93000 ELECTROCARDIOGRAM COMPLETE: CPT | Mod: 59

## 2025-01-09 ENCOUNTER — TRANSCRIPTION ENCOUNTER (OUTPATIENT)
Age: 60
End: 2025-01-09

## 2025-01-09 RX ORDER — TIRZEPATIDE 2.5 MG/.5ML
2.5 INJECTION, SOLUTION SUBCUTANEOUS
Qty: 4 | Refills: 1 | Status: ACTIVE | COMMUNITY
Start: 2025-01-08

## 2025-01-10 ENCOUNTER — TRANSCRIPTION ENCOUNTER (OUTPATIENT)
Age: 60
End: 2025-01-10

## 2025-01-10 LAB
25(OH)D3 SERPL-MCNC: 34.3 NG/ML
ALBUMIN SERPL ELPH-MCNC: 5 G/DL
ALP BLD-CCNC: 135 U/L
ALT SERPL-CCNC: 107 U/L
ANION GAP SERPL CALC-SCNC: 13 MMOL/L
AST SERPL-CCNC: 54 U/L
BASOPHILS # BLD AUTO: 0.03 K/UL
BASOPHILS NFR BLD AUTO: 0.4 %
BILIRUB SERPL-MCNC: 1 MG/DL
BUN SERPL-MCNC: 19 MG/DL
CA-I SERPL-SCNC: 5.3 MG/DL
CALCIUM SERPL-MCNC: 10.5 MG/DL
CALCIUM SERPL-MCNC: 10.5 MG/DL
CHLORIDE SERPL-SCNC: 103 MMOL/L
CO2 SERPL-SCNC: 26 MMOL/L
CREAT SERPL-MCNC: 0.96 MG/DL
EGFR: 92 ML/MIN/1.73M2
EOSINOPHIL # BLD AUTO: 0.15 K/UL
EOSINOPHIL NFR BLD AUTO: 2 %
ESTIMATED AVERAGE GLUCOSE: 123 MG/DL
FOLATE SERPL-MCNC: >20 NG/ML
GLUCOSE SERPL-MCNC: 155 MG/DL
HBA1C MFR BLD HPLC: 5.9 %
HCT VFR BLD CALC: 50.9 %
HGB BLD-MCNC: 17.2 G/DL
IMM GRANULOCYTES NFR BLD AUTO: 0.3 %
LYMPHOCYTES # BLD AUTO: 1.52 K/UL
LYMPHOCYTES NFR BLD AUTO: 19.9 %
MAGNESIUM SERPL-MCNC: 2.1 MG/DL
MAN DIFF?: NORMAL
MCHC RBC-ENTMCNC: 30.4 PG
MCHC RBC-ENTMCNC: 33.8 G/DL
MCV RBC AUTO: 89.9 FL
MONOCYTES # BLD AUTO: 0.57 K/UL
MONOCYTES NFR BLD AUTO: 7.5 %
NEUTROPHILS # BLD AUTO: 5.34 K/UL
NEUTROPHILS NFR BLD AUTO: 69.9 %
PARATHYROID HORMONE INTACT: 36 PG/ML
PHOSPHATE SERPL-MCNC: 2.7 MG/DL
PLATELET # BLD AUTO: 236 K/UL
POTASSIUM SERPL-SCNC: 4.1 MMOL/L
PROT SERPL-MCNC: 8.3 G/DL
RBC # BLD: 5.66 M/UL
RBC # FLD: 13.6 %
SODIUM SERPL-SCNC: 142 MMOL/L
VIT B12 SERPL-MCNC: 600 PG/ML
WBC # FLD AUTO: 7.63 K/UL

## 2025-01-17 ENCOUNTER — NON-APPOINTMENT (OUTPATIENT)
Age: 60
End: 2025-01-17

## 2025-01-18 LAB — PTH RELATED PROT SERPL-MCNC: <2 PMOL/L

## 2025-01-22 ENCOUNTER — RESULT REVIEW (OUTPATIENT)
Age: 60
End: 2025-01-22

## 2025-01-22 ENCOUNTER — APPOINTMENT (OUTPATIENT)
Dept: MRI IMAGING | Facility: CLINIC | Age: 60
End: 2025-01-22

## 2025-01-22 ENCOUNTER — OUTPATIENT (OUTPATIENT)
Dept: OUTPATIENT SERVICES | Facility: HOSPITAL | Age: 60
LOS: 1 days | End: 2025-01-22
Payer: COMMERCIAL

## 2025-01-22 DIAGNOSIS — Z00.8 ENCOUNTER FOR OTHER GENERAL EXAMINATION: ICD-10-CM

## 2025-01-22 DIAGNOSIS — Z98.890 OTHER SPECIFIED POSTPROCEDURAL STATES: Chronic | ICD-10-CM

## 2025-01-22 PROCEDURE — A9585: CPT

## 2025-01-22 PROCEDURE — 74183 MRI ABD W/O CNTR FLWD CNTR: CPT | Mod: 26

## 2025-01-22 PROCEDURE — 74183 MRI ABD W/O CNTR FLWD CNTR: CPT

## 2025-01-23 ENCOUNTER — OUTPATIENT (OUTPATIENT)
Dept: OUTPATIENT SERVICES | Facility: HOSPITAL | Age: 60
LOS: 1 days | End: 2025-01-23
Payer: COMMERCIAL

## 2025-01-23 ENCOUNTER — TRANSCRIPTION ENCOUNTER (OUTPATIENT)
Age: 60
End: 2025-01-23

## 2025-01-23 ENCOUNTER — APPOINTMENT (OUTPATIENT)
Dept: CARDIOLOGY | Facility: CLINIC | Age: 60
End: 2025-01-23

## 2025-01-23 VITALS
SYSTOLIC BLOOD PRESSURE: 138 MMHG | WEIGHT: 220.02 LBS | OXYGEN SATURATION: 98 % | HEART RATE: 80 BPM | DIASTOLIC BLOOD PRESSURE: 97 MMHG | RESPIRATION RATE: 16 BRPM | HEIGHT: 68 IN | TEMPERATURE: 98 F

## 2025-01-23 VITALS
SYSTOLIC BLOOD PRESSURE: 153 MMHG | RESPIRATION RATE: 16 BRPM | HEART RATE: 775 BPM | OXYGEN SATURATION: 96 % | DIASTOLIC BLOOD PRESSURE: 90 MMHG | TEMPERATURE: 99 F

## 2025-01-23 DIAGNOSIS — Z98.890 OTHER SPECIFIED POSTPROCEDURAL STATES: Chronic | ICD-10-CM

## 2025-01-23 DIAGNOSIS — I48.0 PAROXYSMAL ATRIAL FIBRILLATION: ICD-10-CM

## 2025-01-23 DIAGNOSIS — Z98.89 OTHER SPECIFIED POSTPROCEDURAL STATES: Chronic | ICD-10-CM

## 2025-01-23 PROCEDURE — 33285 INSJ SUBQ CAR RHYTHM MNTR: CPT

## 2025-01-23 PROCEDURE — C1764: CPT

## 2025-01-23 RX ORDER — CEPHALEXIN 500 MG
500 CAPSULE ORAL ONCE
Refills: 0 | Status: COMPLETED | OUTPATIENT
Start: 2025-01-23 | End: 2025-01-23

## 2025-01-23 RX ORDER — ATORVASTATIN CALCIUM 80 MG/1
1 TABLET, FILM COATED ORAL
Refills: 0 | DISCHARGE

## 2025-01-23 RX ADMIN — Medication 500 MILLIGRAM(S): at 07:17

## 2025-01-23 NOTE — ASU DISCHARGE PLAN (ADULT/PEDIATRIC) - ASU DC SPECIAL INSTRUCTIONSFT
WOUND CARE:  Do NOT scrub, rub, or pick at your incision site  the glue will wear off in 5-7 days  do not get your incision wet for 3 days   AFTER 3 days you may shower:   - use mild soap and gentle warm stream, pat dry with towel  DO NOT apply lotions, powders, ointment, or perfumes to incision  wear loose clothing around incision for 7 days  f/u appointment as instructed     ACTIVITY:   resume normal activities    Follow heart healthy diet recommended by your doctor, if you smoke STOP SMOKING (may call [phone] for center of tobacco control if you need assistance)     ***CALL YOUR DOCTOR***  if you experience: fever, chills, body aches, or severe pain, swelling, redness, heat or yellow discharge at incision site  If you are unable to reach your doctor, you may contact Cardiology Office at [facility] at [phone]

## 2025-01-23 NOTE — H&P CARDIOLOGY - NSICDXPASTSURGICALHX_GEN_ALL_CORE_FT
PAST SURGICAL HISTORY:  History of carpal tunnel release     History of knee surgery     History of shoulder surgery     S/P arthroscopy left shoulder- 2014, left knee-1999    S/P cardiac cath

## 2025-01-23 NOTE — H&P CARDIOLOGY - HISTORY OF PRESENT ILLNESS
60 y/o male who present with Afib (on Eliquis, last dose 1/22 pm), hx DCCV 4/13/23 s/p sotalol, no recent episodes of Afib since sotalol loading, HTN, AMY, obesity (started on Zepbound inj) here for loop recorder implant to assess if he needs AC long term.  Denies dizziness, diaphoresis, palpitations, nausea, vomiting, peripheral edema, recent weight gain, or syncope.     Dr Meza  60 y/o male who present with Afib (on Eliquis, last dose 1/22 pm), hx DCCV 4/13/23 s/p sotalol loading, no recent episodes of Afib since sotalol loading, CAD diffuse disease without obstruction (dx cath 3/21/23, on Plavix, high dose statin), HTN, AMY, obesity (started on Zepbound inj. recently) here for loop recorder implant to assess if he needs AC long term.  Denies dizziness, diaphoresis, palpitations, nausea, vomiting, peripheral edema, recent weight gain, or syncope.     Dr Meza

## 2025-01-23 NOTE — ASU DISCHARGE PLAN (ADULT/PEDIATRIC) - PROVIDER TOKENS
PROVIDER:[TOKEN:[[id number]],FOLLOWUP:[Routine]],PROVIDER:[TOKEN:[[id number]],FOLLOWUP:[1 month]]

## 2025-01-23 NOTE — ASU DISCHARGE PLAN (ADULT/PEDIATRIC) - FINANCIAL ASSISTANCE
[facility] provides services at a reduced cost to those who are determined to be eligible through [facility]’s financial assistance program. Information regarding [facility]’s financial assistance program can be found by going to [email or web address] or by calling [phone].

## 2025-01-23 NOTE — ASU DISCHARGE PLAN (ADULT/PEDIATRIC) - CARE PROVIDER_API CALL
Moy Meza.  Cardiac Electrophysiology  [address] [place] [address]  Phone: [phone]  Fax: [phone]  Follow Up Time: Routine    Lisker, Jay Justin  Cardiovascular Disease  [address] [address]  Phone: [phone]  Fax: [phone]  Follow Up Time: 1 month

## 2025-01-24 ENCOUNTER — NON-APPOINTMENT (OUTPATIENT)
Age: 60
End: 2025-01-24

## 2025-01-29 ENCOUNTER — NON-APPOINTMENT (OUTPATIENT)
Age: 60
End: 2025-01-29

## 2025-01-29 ENCOUNTER — APPOINTMENT (OUTPATIENT)
Dept: CARDIOLOGY | Facility: CLINIC | Age: 60
End: 2025-01-29
Payer: COMMERCIAL

## 2025-01-29 VITALS
DIASTOLIC BLOOD PRESSURE: 70 MMHG | HEIGHT: 68 IN | SYSTOLIC BLOOD PRESSURE: 120 MMHG | WEIGHT: 217 LBS | HEART RATE: 86 BPM | BODY MASS INDEX: 32.89 KG/M2 | OXYGEN SATURATION: 95 %

## 2025-01-29 DIAGNOSIS — I10 ESSENTIAL (PRIMARY) HYPERTENSION: ICD-10-CM

## 2025-01-29 DIAGNOSIS — I48.91 UNSPECIFIED ATRIAL FIBRILLATION: ICD-10-CM

## 2025-01-29 PROCEDURE — G2211 COMPLEX E/M VISIT ADD ON: CPT

## 2025-01-29 PROCEDURE — 93000 ELECTROCARDIOGRAM COMPLETE: CPT

## 2025-01-29 PROCEDURE — 99214 OFFICE O/P EST MOD 30 MIN: CPT

## 2025-02-04 ENCOUNTER — APPOINTMENT (OUTPATIENT)
Dept: ELECTROPHYSIOLOGY | Facility: CLINIC | Age: 60
End: 2025-02-04
Payer: COMMERCIAL

## 2025-02-04 ENCOUNTER — NON-APPOINTMENT (OUTPATIENT)
Age: 60
End: 2025-02-04

## 2025-02-04 VITALS — OXYGEN SATURATION: 99 % | DIASTOLIC BLOOD PRESSURE: 86 MMHG | SYSTOLIC BLOOD PRESSURE: 122 MMHG | HEART RATE: 82 BPM

## 2025-02-04 PROCEDURE — 99212 OFFICE O/P EST SF 10 MIN: CPT | Mod: 25

## 2025-02-04 PROCEDURE — 93291 INTERROG DEV EVAL SCRMS IP: CPT

## 2025-02-04 PROCEDURE — 93000 ELECTROCARDIOGRAM COMPLETE: CPT | Mod: 59

## 2025-02-10 ENCOUNTER — TRANSCRIPTION ENCOUNTER (OUTPATIENT)
Age: 60
End: 2025-02-10

## 2025-02-13 ENCOUNTER — TRANSCRIPTION ENCOUNTER (OUTPATIENT)
Age: 60
End: 2025-02-13

## 2025-02-28 ENCOUNTER — NON-APPOINTMENT (OUTPATIENT)
Age: 60
End: 2025-02-28

## 2025-03-05 ENCOUNTER — OUTPATIENT (OUTPATIENT)
Dept: OUTPATIENT SERVICES | Facility: HOSPITAL | Age: 60
LOS: 1 days | End: 2025-03-05
Payer: COMMERCIAL

## 2025-03-05 VITALS
SYSTOLIC BLOOD PRESSURE: 140 MMHG | DIASTOLIC BLOOD PRESSURE: 78 MMHG | RESPIRATION RATE: 17 BRPM | TEMPERATURE: 98 F | HEIGHT: 68 IN | WEIGHT: 210.1 LBS | OXYGEN SATURATION: 98 % | HEART RATE: 74 BPM

## 2025-03-05 VITALS
HEART RATE: 85 BPM | SYSTOLIC BLOOD PRESSURE: 141 MMHG | TEMPERATURE: 99 F | DIASTOLIC BLOOD PRESSURE: 84 MMHG | OXYGEN SATURATION: 95 % | RESPIRATION RATE: 16 BRPM

## 2025-03-05 DIAGNOSIS — Z98.89 OTHER SPECIFIED POSTPROCEDURAL STATES: Chronic | ICD-10-CM

## 2025-03-05 DIAGNOSIS — Z98.890 OTHER SPECIFIED POSTPROCEDURAL STATES: Chronic | ICD-10-CM

## 2025-03-05 DIAGNOSIS — I48.0 PAROXYSMAL ATRIAL FIBRILLATION: ICD-10-CM

## 2025-03-05 LAB
ANION GAP SERPL CALC-SCNC: 12 MMOL/L — SIGNIFICANT CHANGE UP (ref 5–17)
BLD GP AB SCN SERPL QL: NEGATIVE — SIGNIFICANT CHANGE UP
BUN SERPL-MCNC: 17 MG/DL — SIGNIFICANT CHANGE UP (ref 7–23)
CALCIUM SERPL-MCNC: 10 MG/DL — SIGNIFICANT CHANGE UP (ref 8.4–10.5)
CHLORIDE SERPL-SCNC: 106 MMOL/L — SIGNIFICANT CHANGE UP (ref 96–108)
CO2 SERPL-SCNC: 25 MMOL/L — SIGNIFICANT CHANGE UP (ref 22–31)
CREAT SERPL-MCNC: 0.89 MG/DL — SIGNIFICANT CHANGE UP (ref 0.5–1.3)
EGFR: 99 ML/MIN/1.73M2 — SIGNIFICANT CHANGE UP
EGFR: 99 ML/MIN/1.73M2 — SIGNIFICANT CHANGE UP
GLUCOSE SERPL-MCNC: 96 MG/DL — SIGNIFICANT CHANGE UP (ref 70–99)
HCT VFR BLD CALC: 45.4 % — SIGNIFICANT CHANGE UP (ref 39–50)
HGB BLD-MCNC: 15.9 G/DL — SIGNIFICANT CHANGE UP (ref 13–17)
MAGNESIUM SERPL-MCNC: 2.1 MG/DL — SIGNIFICANT CHANGE UP (ref 1.6–2.6)
MCHC RBC-ENTMCNC: 30.7 PG — SIGNIFICANT CHANGE UP (ref 27–34)
MCHC RBC-ENTMCNC: 35 G/DL — SIGNIFICANT CHANGE UP (ref 32–36)
MCV RBC AUTO: 87.6 FL — SIGNIFICANT CHANGE UP (ref 80–100)
NRBC BLD AUTO-RTO: 0 /100 WBCS — SIGNIFICANT CHANGE UP (ref 0–0)
PLATELET # BLD AUTO: 233 K/UL — SIGNIFICANT CHANGE UP (ref 150–400)
POTASSIUM SERPL-MCNC: 3.9 MMOL/L — SIGNIFICANT CHANGE UP (ref 3.5–5.3)
POTASSIUM SERPL-SCNC: 3.9 MMOL/L — SIGNIFICANT CHANGE UP (ref 3.5–5.3)
RBC # BLD: 5.18 M/UL — SIGNIFICANT CHANGE UP (ref 4.2–5.8)
RBC # FLD: 13.1 % — SIGNIFICANT CHANGE UP (ref 10.3–14.5)
RH IG SCN BLD-IMP: POSITIVE — SIGNIFICANT CHANGE UP
SODIUM SERPL-SCNC: 143 MMOL/L — SIGNIFICANT CHANGE UP (ref 135–145)
WBC # BLD: 6.41 K/UL — SIGNIFICANT CHANGE UP (ref 3.8–10.5)
WBC # FLD AUTO: 6.41 K/UL — SIGNIFICANT CHANGE UP (ref 3.8–10.5)

## 2025-03-05 PROCEDURE — 93005 ELECTROCARDIOGRAM TRACING: CPT

## 2025-03-05 PROCEDURE — C1889: CPT

## 2025-03-05 PROCEDURE — C1769: CPT

## 2025-03-05 PROCEDURE — C9399: CPT

## 2025-03-05 PROCEDURE — C1759: CPT

## 2025-03-05 PROCEDURE — 86901 BLOOD TYPING SEROLOGIC RH(D): CPT

## 2025-03-05 PROCEDURE — C1733: CPT

## 2025-03-05 PROCEDURE — 86850 RBC ANTIBODY SCREEN: CPT

## 2025-03-05 PROCEDURE — C1893: CPT

## 2025-03-05 PROCEDURE — C1732: CPT

## 2025-03-05 PROCEDURE — 93623 PRGRMD STIMJ&PACG IV RX NFS: CPT

## 2025-03-05 PROCEDURE — 93010 ELECTROCARDIOGRAM REPORT: CPT

## 2025-03-05 PROCEDURE — 80048 BASIC METABOLIC PNL TOTAL CA: CPT

## 2025-03-05 PROCEDURE — 83735 ASSAY OF MAGNESIUM: CPT

## 2025-03-05 PROCEDURE — 93657 TX L/R ATRIAL FIB ADDL: CPT

## 2025-03-05 PROCEDURE — 86900 BLOOD TYPING SEROLOGIC ABO: CPT

## 2025-03-05 PROCEDURE — C1894: CPT

## 2025-03-05 PROCEDURE — 93623 PRGRMD STIMJ&PACG IV RX NFS: CPT | Mod: 26

## 2025-03-05 PROCEDURE — C1766: CPT

## 2025-03-05 PROCEDURE — 93656 COMPRE EP EVAL ABLTJ ATR FIB: CPT

## 2025-03-05 PROCEDURE — 93010 ELECTROCARDIOGRAM REPORT: CPT | Mod: 77

## 2025-03-05 PROCEDURE — C1760: CPT

## 2025-03-05 PROCEDURE — C1730: CPT

## 2025-03-05 PROCEDURE — 85027 COMPLETE CBC AUTOMATED: CPT

## 2025-03-05 RX ORDER — ZOLPIDEM TARTRATE 5 MG/1
5 TABLET ORAL
Qty: 30 | Refills: 0 | Status: ACTIVE | COMMUNITY
Start: 2025-03-05 | End: 1900-01-01

## 2025-03-05 RX ORDER — ONDANSETRON HCL/PF 4 MG/2 ML
4 VIAL (ML) INJECTION ONCE
Refills: 0 | Status: DISCONTINUED | OUTPATIENT
Start: 2025-03-05 | End: 2025-03-19

## 2025-03-05 RX ORDER — SODIUM CHLORIDE 9 G/1000ML
1000 INJECTION, SOLUTION INTRAVENOUS
Refills: 0 | Status: DISCONTINUED | OUTPATIENT
Start: 2025-03-05 | End: 2025-03-19

## 2025-03-05 RX ORDER — FENTANYL CITRATE-0.9 % NACL/PF 100MCG/2ML
25 SYRINGE (ML) INTRAVENOUS
Refills: 0 | Status: DISCONTINUED | OUTPATIENT
Start: 2025-03-05 | End: 2025-03-05

## 2025-03-05 RX ADMIN — Medication 1 LOZENGE: at 15:27

## 2025-03-05 NOTE — H&P CARDIOLOGY - VENOUS THROMBOEMBOLISM
Negin Winters Shields: [id number] - PA Case ID: [id number] - Rx #: [id number]  PA started for Belbuca 300 mcg   no

## 2025-03-05 NOTE — H&P CARDIOLOGY - NSICDXFAMILYHX_GEN_ALL_CORE_FT
Sofia Caba [facility]  Cardiovascular Disease  [address] [place], [place] [address]  Phone: [phone]  Fax: [phone]  Follow Up Time:   
FAMILY HISTORY:  Father  Still living? Unknown  Family history of heart attack, Age at diagnosis: Age Unknown

## 2025-03-05 NOTE — PATIENT PROFILE ADULT - WHEN WAS YOUR LAST VACCINATION? YEAR
Medication:   Requested Prescriptions     Pending Prescriptions Disp Refills    clonazePAM (KLONOPIN) 0.5 MG tablet 15 tablet 0     Sig: Take 0.5 tablets by mouth nightly as needed (insomnia) for up to 30 days. Last Filled:  2/28/2020    Patient Phone Number: [phone] (home) [phone] (work)    Last appt: 3/17/2020   Next appt: Visit date not found    Last [place]AR[place]:   RX Monitoring 11/12/2019   Attestation -   Periodic Controlled Substance Monitoring No signs of potential drug abuse or diversion identified.      PDMP Monitoring:    Last PDMP Karon Nelson as Reviewed [facility]):  Review User Review Instant Review Result   Kenrick Mendez 11/12/2019 10:15 AM Reviewed PDMP [1]     Preferred Pharmacy:   [facility] [address], [address] [phone] [place] [phone]  [place] [address]  Phone: [phone] Fax: [phone] 2020

## 2025-03-05 NOTE — ASU DISCHARGE PLAN (ADULT/PEDIATRIC) - NS MD DC FALL RISK RISK
For information on Fall & Injury Prevention, visit: [email or web address] OR  [email or web address] OR  https://www.cdc.gov/steadi/patient.html

## 2025-03-05 NOTE — H&P CARDIOLOGY - NS NEC GEN PE MLT EXAM PC
Cystoscopy  Date/Time: 11/27/2018 10:13 AM  Performed by: Yee Goodman by: Vik Wood     Procedure details: cystoscopy        The patient was positioned supine on the procedure table, and prepped and draped in the penoscrotal area in the usual manner  A lubricated 16 Greek flexible cystoscope was inserted per urethra, and there were no lesions, or stricture seen  Prostatic urethra was then addressed, and there was trilobar hypertrophy with injected prostatic urethral mucosa oozing blood  There were no lesions seen otherwise on the mucosa  The bladder was entered and pan cystoscoped  There was 3/4 trabeculation of the bladder wall with cellule but no diverticuli  There were no tumors or stones appreciated  The bilateral ureteral orifices were in their normal position and orientation, with clear efflux of urine    The cystoscope was removed, and he tolerated procedure well No bruits; no thyromegaly or nodules

## 2025-03-05 NOTE — ASU DISCHARGE PLAN (ADULT/PEDIATRIC) - CARE PROVIDER_API CALL
Moy Meza.  Cardiac Electrophysiology  [address] [place] [address]  Phone: [phone]  Fax: [phone]  Established Patient  Scheduled Appointment: 06/03/2025 11:30 AM

## 2025-03-05 NOTE — H&P CARDIOLOGY - HISTORY OF PRESENT ILLNESS
60 y/o male who present with Afib (on Eliquis, last dose 1/22 pm), hx DCCV 4/13/23 s/p sotalol loading, no recent episodes of Afib since sotalol loading, CAD diffuse disease without obstruction (dx cath 3/21/23, on Plavix, high dose statin), HTN, AMY, obesity (started on Zepbound inj. recently), s/p Loop implant in 2024, now presents for PAF ablation. Denies dizziness, diaphoresis, palpitations, nausea, vomiting, peripheral edema, recent weight gain, or syncope.     Dr Meza  58 y/o male who present with Afib (on Eliquis, last dose 3/5), hx DCCV 4/13/23 s/p sotalol loading, no recent episodes of Afib since sotalol loading, CAD diffuse disease without obstruction (dx cath 3/21/23, on Plavix, high dose statin), HTN, AMY, obesity (started on Zepbound inj. recently), s/p Loop implant in 2024, now presents for PAF ablation. Denies dizziness, diaphoresis, palpitations, nausea, vomiting, peripheral edema, recent weight gain, or syncope.     Cards: Dr Meza     Cardiology Summary  Diagnostic Findings:     Coronary Angiography   The coronary circulation is right dominant.      LM   Distal left main: There is a 25 % stenosis.      LAD   Proximal left anterior descending: There is a 60 % stenosis. Instant  wave-free ratio was performed with a calculated value of  0.93. Based on the results of this study, the lesion was judged to be  non-significant and no intervention was performed. First  diagonal: Angiography shows minor irregularities. Second diagonal:  There is a 30 % stenosis.    CX   Ostial circumflex: There is a 70 % stenosis. Instant wave-free ratio  was performed with a calculated value of 0.98. Based on the  results of this study, the lesion was judged to be non-significant and  no intervention was performed.

## 2025-03-05 NOTE — ASU DISCHARGE PLAN (ADULT/PEDIATRIC) - PROVIDER TOKENS
PROVIDER:[TOKEN:[[id number]],SCHEDULEDAPPT:[06/03/2025],SCHEDULEDAPPTTIME:[11:30 AM],ESTABLISHEDPATIENT:[T]]

## 2025-03-05 NOTE — ASU DISCHARGE PLAN (ADULT/PEDIATRIC) - ASU DC SPECIAL INSTRUCTIONSFT
Please see preprinted discharge instruction sheet.    Your return appointment to see Dr. Meza is scheduled on 6/3/25 at 11:30 and needs to be 6-8 weeks post procedure.  Please call his office at the number given below to have it rescheduled.  Thank you.

## 2025-03-05 NOTE — ASU DISCHARGE PLAN (ADULT/PEDIATRIC) - FINANCIAL ASSISTANCE
[facility] provides services at a reduced cost to those who are determined to be eligible through [facility]’s financial assistance program. Information regarding [facility]’s financial assistance program can be found by going to [email or web address] or by calling [phone].

## 2025-03-06 ENCOUNTER — NON-APPOINTMENT (OUTPATIENT)
Age: 60
End: 2025-03-06

## 2025-03-10 ENCOUNTER — NON-APPOINTMENT (OUTPATIENT)
Age: 60
End: 2025-03-10

## 2025-03-10 ENCOUNTER — APPOINTMENT (OUTPATIENT)
Dept: CARDIOLOGY | Facility: CLINIC | Age: 60
End: 2025-03-10
Payer: COMMERCIAL

## 2025-03-10 VITALS
HEART RATE: 82 BPM | SYSTOLIC BLOOD PRESSURE: 96 MMHG | WEIGHT: 202 LBS | BODY MASS INDEX: 30.71 KG/M2 | DIASTOLIC BLOOD PRESSURE: 76 MMHG | OXYGEN SATURATION: 96 %

## 2025-03-10 DIAGNOSIS — I51.9 HEART DISEASE, UNSPECIFIED: ICD-10-CM

## 2025-03-10 DIAGNOSIS — I48.0 PAROXYSMAL ATRIAL FIBRILLATION: ICD-10-CM

## 2025-03-10 DIAGNOSIS — I10 ESSENTIAL (PRIMARY) HYPERTENSION: ICD-10-CM

## 2025-03-10 PROCEDURE — G2211 COMPLEX E/M VISIT ADD ON: CPT

## 2025-03-10 PROCEDURE — 93000 ELECTROCARDIOGRAM COMPLETE: CPT

## 2025-03-10 PROCEDURE — 99215 OFFICE O/P EST HI 40 MIN: CPT

## 2025-03-11 ENCOUNTER — NON-APPOINTMENT (OUTPATIENT)
Age: 60
End: 2025-03-11

## 2025-03-11 ENCOUNTER — APPOINTMENT (OUTPATIENT)
Dept: ELECTROPHYSIOLOGY | Facility: CLINIC | Age: 60
End: 2025-03-11

## 2025-03-11 PROCEDURE — 93298 REM INTERROG DEV EVAL SCRMS: CPT

## 2025-03-20 ENCOUNTER — TRANSCRIPTION ENCOUNTER (OUTPATIENT)
Age: 60
End: 2025-03-20

## 2025-03-21 ENCOUNTER — LABORATORY RESULT (OUTPATIENT)
Age: 60
End: 2025-03-21

## 2025-03-27 ENCOUNTER — APPOINTMENT (OUTPATIENT)
Dept: CARDIOLOGY | Facility: CLINIC | Age: 60
End: 2025-03-27
Payer: COMMERCIAL

## 2025-03-27 ENCOUNTER — APPOINTMENT (OUTPATIENT)
Dept: GASTROENTEROLOGY | Facility: CLINIC | Age: 60
End: 2025-03-27

## 2025-03-27 ENCOUNTER — NON-APPOINTMENT (OUTPATIENT)
Age: 60
End: 2025-03-27

## 2025-03-27 VITALS — HEART RATE: 82 BPM | OXYGEN SATURATION: 97 % | DIASTOLIC BLOOD PRESSURE: 70 MMHG | SYSTOLIC BLOOD PRESSURE: 117 MMHG

## 2025-03-27 DIAGNOSIS — I48.91 UNSPECIFIED ATRIAL FIBRILLATION: ICD-10-CM

## 2025-03-27 DIAGNOSIS — I10 ESSENTIAL (PRIMARY) HYPERTENSION: ICD-10-CM

## 2025-03-27 PROCEDURE — 93000 ELECTROCARDIOGRAM COMPLETE: CPT

## 2025-03-27 PROCEDURE — 93306 TTE W/DOPPLER COMPLETE: CPT

## 2025-03-27 PROCEDURE — 99215 OFFICE O/P EST HI 40 MIN: CPT | Mod: 25

## 2025-04-01 ENCOUNTER — APPOINTMENT (OUTPATIENT)
Dept: ORTHOPEDIC SURGERY | Facility: CLINIC | Age: 60
End: 2025-04-01
Payer: COMMERCIAL

## 2025-04-01 DIAGNOSIS — M72.2 PLANTAR FASCIAL FIBROMATOSIS: ICD-10-CM

## 2025-04-01 DIAGNOSIS — M77.8 OTHER ENTHESOPATHIES, NOT ELSEWHERE CLASSIFIED: ICD-10-CM

## 2025-04-01 DIAGNOSIS — M21.42 FLAT FOOT [PES PLANUS] (ACQUIRED), LEFT FOOT: ICD-10-CM

## 2025-04-01 DIAGNOSIS — M62.462 CONTRACTURE OF MUSCLE, LEFT LOWER LEG: ICD-10-CM

## 2025-04-01 PROCEDURE — 20550 NJX 1 TENDON SHEATH/LIGAMENT: CPT | Mod: LT

## 2025-04-01 PROCEDURE — 99213 OFFICE O/P EST LOW 20 MIN: CPT | Mod: 25

## 2025-04-02 ENCOUNTER — APPOINTMENT (OUTPATIENT)
Dept: INTERNAL MEDICINE | Facility: CLINIC | Age: 60
End: 2025-04-02

## 2025-04-10 ENCOUNTER — TRANSCRIPTION ENCOUNTER (OUTPATIENT)
Age: 60
End: 2025-04-10

## 2025-04-11 ENCOUNTER — APPOINTMENT (OUTPATIENT)
Dept: ELECTROPHYSIOLOGY | Facility: CLINIC | Age: 60
End: 2025-04-11

## 2025-04-11 PROCEDURE — 93298 REM INTERROG DEV EVAL SCRMS: CPT

## 2025-04-22 NOTE — DISCHARGE NOTE NURSING/CASE MANAGEMENT/SOCIAL WORK - PATIENT PORTAL LINK FT
Render Note In Bullet Format When Appropriate: No Detail Level: Detailed Post-Care Instructions: I reviewed with the patient in detail post-care instructions. Patient is to wear sunprotection, and avoid picking at any of the treated lesions. Patient may apply Vaseline to crusted or scabbing areas. You can access the FollowMyHealth Patient Portal offered by [facility] by registering at the following website: [email or web address]. By joining [employer]’s FollowMyHealth portal, you will also be able to view your health information using other applications (apps) compatible with our system. Duration Of Freeze Thaw-Cycle (Seconds): 0 Show Applicator Variable?: Yes Consent: The patient's verbal consent was obtained including but not limited to risks of crusting, scabbing, blistering, scarring, darker or lighter pigmentary change, recurrence, incomplete removal and infection.

## 2025-04-27 ENCOUNTER — EMERGENCY (EMERGENCY)
Facility: HOSPITAL | Age: 60
LOS: 1 days | End: 2025-04-27
Attending: EMERGENCY MEDICINE
Payer: COMMERCIAL

## 2025-04-27 VITALS
TEMPERATURE: 98 F | DIASTOLIC BLOOD PRESSURE: 88 MMHG | HEIGHT: 68 IN | RESPIRATION RATE: 16 BRPM | SYSTOLIC BLOOD PRESSURE: 137 MMHG | HEART RATE: 103 BPM | OXYGEN SATURATION: 96 %

## 2025-04-27 DIAGNOSIS — Z98.89 OTHER SPECIFIED POSTPROCEDURAL STATES: Chronic | ICD-10-CM

## 2025-04-27 DIAGNOSIS — Z98.890 OTHER SPECIFIED POSTPROCEDURAL STATES: Chronic | ICD-10-CM

## 2025-04-27 LAB
ALBUMIN SERPL ELPH-MCNC: 4.6 G/DL — SIGNIFICANT CHANGE UP (ref 3.3–5)
ALP SERPL-CCNC: 128 U/L — HIGH (ref 40–120)
ALT FLD-CCNC: 71 U/L — HIGH (ref 10–45)
ANION GAP SERPL CALC-SCNC: 16 MMOL/L — SIGNIFICANT CHANGE UP (ref 5–17)
APTT BLD: 32.1 SEC — SIGNIFICANT CHANGE UP (ref 26.1–36.8)
AST SERPL-CCNC: 61 U/L — HIGH (ref 10–40)
BASOPHILS # BLD AUTO: 0.03 K/UL — SIGNIFICANT CHANGE UP (ref 0–0.2)
BASOPHILS NFR BLD AUTO: 0.3 % — SIGNIFICANT CHANGE UP (ref 0–2)
BILIRUB SERPL-MCNC: 0.8 MG/DL — SIGNIFICANT CHANGE UP (ref 0.2–1.2)
BUN SERPL-MCNC: 16 MG/DL — SIGNIFICANT CHANGE UP (ref 7–23)
CALCIUM SERPL-MCNC: 9.2 MG/DL — SIGNIFICANT CHANGE UP (ref 8.4–10.5)
CHLORIDE SERPL-SCNC: 106 MMOL/L — SIGNIFICANT CHANGE UP (ref 96–108)
CK SERPL-CCNC: 178 U/L — SIGNIFICANT CHANGE UP (ref 30–200)
CO2 SERPL-SCNC: 21 MMOL/L — LOW (ref 22–31)
CREAT SERPL-MCNC: 0.96 MG/DL — SIGNIFICANT CHANGE UP (ref 0.5–1.3)
EGFR: 92 ML/MIN/1.73M2 — SIGNIFICANT CHANGE UP
EGFR: 92 ML/MIN/1.73M2 — SIGNIFICANT CHANGE UP
EOSINOPHIL # BLD AUTO: 0.16 K/UL — SIGNIFICANT CHANGE UP (ref 0–0.5)
EOSINOPHIL NFR BLD AUTO: 1.8 % — SIGNIFICANT CHANGE UP (ref 0–6)
ETHANOL SERPL-MCNC: <10 MG/DL — SIGNIFICANT CHANGE UP (ref 0–10)
GAS PNL BLDV: SIGNIFICANT CHANGE UP
GLUCOSE SERPL-MCNC: 110 MG/DL — HIGH (ref 70–99)
HCT VFR BLD CALC: 45 % — SIGNIFICANT CHANGE UP (ref 39–50)
HGB BLD-MCNC: 15.6 G/DL — SIGNIFICANT CHANGE UP (ref 13–17)
IMM GRANULOCYTES NFR BLD AUTO: 0.2 % — SIGNIFICANT CHANGE UP (ref 0–0.9)
INR BLD: 1.23 RATIO — HIGH (ref 0.85–1.16)
LYMPHOCYTES # BLD AUTO: 1.75 K/UL — SIGNIFICANT CHANGE UP (ref 1–3.3)
LYMPHOCYTES # BLD AUTO: 19.4 % — SIGNIFICANT CHANGE UP (ref 13–44)
MAGNESIUM SERPL-MCNC: 2 MG/DL — SIGNIFICANT CHANGE UP (ref 1.6–2.6)
MCHC RBC-ENTMCNC: 30.8 PG — SIGNIFICANT CHANGE UP (ref 27–34)
MCHC RBC-ENTMCNC: 34.7 G/DL — SIGNIFICANT CHANGE UP (ref 32–36)
MCV RBC AUTO: 88.8 FL — SIGNIFICANT CHANGE UP (ref 80–100)
MONOCYTES # BLD AUTO: 0.49 K/UL — SIGNIFICANT CHANGE UP (ref 0–0.9)
MONOCYTES NFR BLD AUTO: 5.4 % — SIGNIFICANT CHANGE UP (ref 2–14)
NEUTROPHILS # BLD AUTO: 6.57 K/UL — SIGNIFICANT CHANGE UP (ref 1.8–7.4)
NEUTROPHILS NFR BLD AUTO: 72.9 % — SIGNIFICANT CHANGE UP (ref 43–77)
NRBC BLD AUTO-RTO: 0 /100 WBCS — SIGNIFICANT CHANGE UP (ref 0–0)
PHOSPHATE SERPL-MCNC: 2.1 MG/DL — LOW (ref 2.5–4.5)
PLATELET # BLD AUTO: 249 K/UL — SIGNIFICANT CHANGE UP (ref 150–400)
POTASSIUM SERPL-MCNC: 3.9 MMOL/L — SIGNIFICANT CHANGE UP (ref 3.5–5.3)
POTASSIUM SERPL-SCNC: 3.9 MMOL/L — SIGNIFICANT CHANGE UP (ref 3.5–5.3)
PROT SERPL-MCNC: 7.2 G/DL — SIGNIFICANT CHANGE UP (ref 6–8.3)
PROTHROM AB SERPL-ACNC: 14.1 SEC — HIGH (ref 9.9–13.4)
RBC # BLD: 5.07 M/UL — SIGNIFICANT CHANGE UP (ref 4.2–5.8)
RBC # FLD: 13.2 % — SIGNIFICANT CHANGE UP (ref 10.3–14.5)
SODIUM SERPL-SCNC: 143 MMOL/L — SIGNIFICANT CHANGE UP (ref 135–145)
TROPONIN T, HIGH SENSITIVITY RESULT: 9 NG/L — SIGNIFICANT CHANGE UP (ref 0–51)
WBC # BLD: 9.02 K/UL — SIGNIFICANT CHANGE UP (ref 3.8–10.5)
WBC # FLD AUTO: 9.02 K/UL — SIGNIFICANT CHANGE UP (ref 3.8–10.5)

## 2025-04-27 PROCEDURE — 0042T: CPT

## 2025-04-27 PROCEDURE — 93010 ELECTROCARDIOGRAM REPORT: CPT

## 2025-04-27 PROCEDURE — 70496 CT ANGIOGRAPHY HEAD: CPT | Mod: 26

## 2025-04-27 PROCEDURE — 70498 CT ANGIOGRAPHY NECK: CPT | Mod: 26

## 2025-04-27 PROCEDURE — 99291 CRITICAL CARE FIRST HOUR: CPT

## 2025-04-27 PROCEDURE — 70450 CT HEAD/BRAIN W/O DYE: CPT | Mod: 26,59

## 2025-04-27 RX ORDER — MECLIZINE HCL 12.5 MG
25 TABLET ORAL ONCE
Refills: 0 | Status: COMPLETED | OUTPATIENT
Start: 2025-04-27 | End: 2025-04-27

## 2025-04-27 RX ORDER — METOCLOPRAMIDE HCL 10 MG
10 TABLET ORAL ONCE
Refills: 0 | Status: COMPLETED | OUTPATIENT
Start: 2025-04-27 | End: 2025-04-27

## 2025-04-27 RX ADMIN — Medication 500 MILLILITER(S): at 21:56

## 2025-04-27 RX ADMIN — Medication 10 MILLIGRAM(S): at 21:56

## 2025-04-27 RX ADMIN — Medication 1 MILLIGRAM(S): at 21:30

## 2025-04-27 RX ADMIN — Medication 25 MILLIGRAM(S): at 21:56

## 2025-04-27 RX ADMIN — Medication 1000 MILLILITER(S): at 23:46

## 2025-04-27 NOTE — ED PROVIDER NOTE - NSFOLLOWUPINSTRUCTIONS_ED_ALL_ED_FT
You were seen in the ED for  dizziness presumptive diagnosis is peripheral vertigo.     Take Meclizine as instructed. Remain hydrated. Follow up with your Primary Doctor and Neurologist within one week.    Return with any chest pain, worsening dizziness, or any other concerning symptoms.      Vertigo is the feeling that you or your surroundings are moving when they are not. This feeling can come and go at any time. Vertigo often goes away on its own. Vertigo can be dangerous if it occurs while you are doing something that could endanger yourself or others, such as driving or operating machinery.    Your health care provider will do tests to try to determine the cause of your vertigo. Tests will also help your health care provider decide how best to treat your condition.    Follow these instructions at home:  Eating and drinking    A comparison of three sample cups showing dark yellow, yellow, and pale yellow urine.  A sign showing that a person should not drink beer, wine, or liquor.  Dehydration can make vertigo worse. Drink enough fluid to keep your urine pale yellow.  Do not drink alcohol.  Activity    Return to your normal activities as told by your health care provider. Ask your health care provider what activities are safe for you.  In the morning, first sit up on the side of the bed. When you feel okay, stand slowly while you hold onto something until you know that your balance is fine.  Move slowly. Avoid sudden body or head movements or certain positions, as told by your health care provider.  If you have trouble walking or keeping your balance, try using a cane for stability. If you feel dizzy or unstable, sit down right away.  Avoid doing any tasks that would cause danger to you or others if vertigo occurs.  Avoid bending down if you feel dizzy. Place items in your home so that they are easy for you to reach without bending or leaning over.  Do not drive or use machinery if you feel dizzy.  General instructions    Take over-the-counter and prescription medicines only as told by your health care provider.  Keep all follow-up visits. This is important.  Contact a health care provider if:  Your medicines do not relieve your vertigo or they make it worse.  Your condition gets worse or you develop new symptoms.  You have a fever.  You develop nausea or vomiting, or if nausea gets worse.  Your family or friends notice any behavioral changes.  You have numbness or a prickling and tingling sensation in part of your body.  Get help right away if you:  Are always dizzy or you faint.  Develop severe headaches.  Develop a stiff neck.  Develop sensitivity to light.  Have difficulty moving or speaking.  Have weakness in your hands, arms, or legs.  Have changes in your hearing or vision.  These symptoms may represent a serious problem that is an emergency. Do not wait to see if the symptoms will go away. Get medical help right away. Call your local emergency services (911 in the [place] Do not drive yourself to the hospital.    Summary  Vertigo is the feeling that you or your surroundings are moving when they are not.  Your health care provider will do tests to try to determine the cause of your vertigo.  Follow instructions for home care. You may be told to avoid certain tasks, positions, or movements.  Contact a health care provider if your medicines do not relieve your symptoms, or if you have a fever, nausea, vomiting, or changes in behavior.  Get help right away if you have severe headaches or difficulty speaking, or you develop hearing or vision problems.  This information is not intended to replace advice given to you by your health care provider. Make sure you discuss any questions you have with your health care provider.

## 2025-04-27 NOTE — ED PROVIDER NOTE - PHYSICAL EXAMINATION
GENERAL: tremulous   HEAD: normocephalic, atraumatic  CARDIAC: regular rate and rhythm, normal S1S2, no appreciable murmurs, 2+ pulses in UE/LE b/l  PULM: normal breath sounds, clear to ascultation bilaterally, no rales, rhonchi, wheezing  NEURO: Moves all extremities spontaneously. symmetric  strength b/l UE, elbow flexion 5/5 bilaterally, elbow extension 5/5 bilaterally, patient can straight leg raise bilaterally and resist symmetrically, symmetric smile, EOM intact b/ll; r sided beating nystagmus   SKIN: well-perfused, extremities warm, no visible rashes

## 2025-04-27 NOTE — ED ADULT TRIAGE NOTE - PATIENT'S PREFERRED PRONOUN
Carac Pregnancy And Lactation Text: This medication is Pregnancy Category X and contraindicated in pregnancy and in women who may become pregnant. It is unknown if this medication is excreted in breast milk. Him/He

## 2025-04-27 NOTE — CONSULT NOTE ADULT - ATTENDING COMMENTS
agree with above  4/28  quick outpatietn f/u   Deng Nava MD  Vascular Neurology  Office: [phone]

## 2025-04-27 NOTE — ED PROVIDER NOTE - ATTENDING CONTRIBUTION TO CARE
Hx: code stroke protocol on arrival.  Pt presenting from home, wife at bedside later, c/o staring spell, dizzy spell.  Currently anxious and tremulous.      PE: appears anxious, intermittent tremulousness with varying frequency especially of bilateral legs, awake, alert, ambulatory in ED, no respiratory distress.     MDM: transient dizziness with staring spell, pt deemed high risk for cardiovascular event including stroke given cardiac history, proceed with stat ct head, cta head and neck r/o stroke/vacular occlusion/ICH, check cbc r/o anemia or leukocytosis, check bmp to r/o metabolic derangement and lyte imbalance, metabolic workup, including mag, phos, cpk, neuro consult.

## 2025-04-27 NOTE — ED PROVIDER NOTE - CHIEF COMPLAINT
"Spoke to patient.    Taken onset medication, taken naproxen, coffee, water.     1. LOCATION: \"Where does it hurt?\"       Front of head is pounding  2. ONSET: \"When did the headache start?\" (Minutes, hours or days)       8 this morning  3. PATTERN: \"Does the pain come and go, or has it been constant since it started?\"      Constant to worsening  4. SEVERITY: \"How bad is the pain?\" and \"What does it keep you from doing?\"  (e.g., Scale 1-10; mild, moderate, or severe)    - MILD (1-3): doesn't interfere with normal activities     - MODERATE (4-7): interferes with normal activities or awakens from sleep     - SEVERE (8-10): excruciating pain, unable to do any normal activities         8/10  5. RECURRENT SYMPTOM: \"Have you ever had headaches before?\" If so, ask: \"When was the last time?\" and \"What happened that time?\"       Yes  6. CAUSE: \"What do you think is causing the headache?\"      Migraine  7. MIGRAINE: \"Have you been diagnosed with migraine headaches?\" If so, ask: \"Is this headache similar?\"       yes  8. HEAD INJURY: \"Has there been any recent injury to the head?\"       no  9. OTHER SYMPTOMS: \"Do you have any other symptoms?\" (fever, stiff neck, eye pain, sore throat, cold symptoms)      no  10. PREGNANCY: \"Is there any chance you are pregnant?\" \"When was your last menstrual period?\"        Na    RN advised ER, patient's  will drive her. Going to [facility].    Yuridia Justin RN BSN      Additional Information    Negative: Difficult to awaken or acting confused (e.g., disoriented, slurred speech)    Negative: Weakness of the face, arm or leg on one side of the body and new onset    Negative: Numbness of the face, arm or leg on one side of the body and new onset    Negative: Loss of speech or garbled speech and new onset    Negative: Passed out (i.e., fainted, collapsed and was not responding)    Negative: Sounds like a life-threatening emergency to the triager    Negative: Followed a head injury within " last 3 days    Negative: Traumatic Brain Injury (TBI) is suspected    Negative: Sinus pain of forehead and yellow or green nasal discharge    Negative: Pregnant    Negative: Unable to walk without falling    Negative: Stiff neck (can't touch chin to chest)    Negative: Possibility of carbon monoxide exposure    Negative: SEVERE headache, sudden onset (i.e., reaching maximum intensity within 30 seconds)    Negative: Severe pain in one eye    Negative: Loss of vision or double vision    Negative: Patient sounds very sick or weak to the triager    SEVERE headache, states 'worst headache' of life    Protocols used: HEADACHE-A-OH       The patient is a 59y Male complaining of altered mental status.

## 2025-04-27 NOTE — ED ADULT TRIAGE NOTE - NS ED NURSE AMBULANCES
Physician Progress Note      PATIENT:               Mahamed Perez  CSN #:                  [id number]  :                       [id number]  ADMIT DATE:       2021 6:52 PM  [address] [place] DATE:  RESPONDING  PROVIDER #:        Rojas RUANO MD        QUERY TEXT:    Type of Anemia: Please provide further specificity, if known. Clinical indicators include: anemia, transfuse, hb, carcinoma, hgb, hct, rbc, platelet  Options provided:  -- Anemia due to acute blood loss  -- Anemia due to chronic blood loss  -- Anemia due to iron deficiency  -- Anemia due to postoperative blood loss  -- Anemia due to chronic disease  -- Other - I will add my own diagnosis  -- Disagree - Not applicable / Not valid  -- Disagree - Clinically Unable to determine / Unknown        PROVIDER RESPONSE TEXT:    Provider was unable to determine a response for this query.       Electronically signed by:  Darrian Villa MD 2021 10:30 AM [facility]

## 2025-04-27 NOTE — ED ADULT NURSE NOTE - NSFALLHARMRISKINTERV_ED_ALL_ED
Assistance OOB with selected safe patient handling equipment if applicable/Communicate risk of Fall with Harm to all staff, patient, and family/Monitor for mental status changes and reorient to person, place, and time, as needed/Move patient closer to nursing station/within visual sight of ED staff/Provide visual cue: red socks, yellow wristband, yellow gown, etc/Reinforce activity limits and safety measures with patient and family/Toileting schedule using arm’s reach rule for commode and bathroom/Use of alarms - bed, stretcher, chair and/or video monitoring/Bed in lowest position, wheels locked, appropriate side rails in place/Call bell, personal items and telephone in reach/Instruct patient to call for assistance before getting out of bed/chair/stretcher/Non-slip footwear applied when patient is off stretcher/[place] to call system/Physically safe environment - no spills, clutter or unnecessary equipment/Purposeful Proactive Rounding/Room/bathroom lighting operational, light cord in reach

## 2025-04-27 NOTE — ED PROVIDER NOTE - OBJECTIVE STATEMENT
59yfAfib (on Eliquis and plavix), hx DCCV 4/13/23 presents as code stroke for episode of blank stare w expressive aphasia as per wife aroud 9pm, prior to that last known well 850. during this episode pt had one episode of non bloody emesis and reported room spinning dizziness. has never had this before. denies recent fevers, chest pain, sob. has been complaint with meds. last drink of etoh friday, social drinker. 59yfAfib (on Eliquis and plavix), hx DCCV 4/13/23 presents as code stroke for episode of blank stare w not responding to her questions, as per wife aroud 9pm, prior to that last known well 850. during this episode pt had one episode of non bloody emesis and reported room spinning dizziness. has never had this before. denies recent fevers, chest pain, sob. has been complaint with meds. last drink of etoh friday, social drinker.

## 2025-04-27 NOTE — ED ADULT TRIAGE NOTE - CHIEF COMPLAINT QUOTE
AMS since 9pm, 1 episode of vomiting,  uncontrollable muscle spasms, difficulty expressing words. On thinners

## 2025-04-27 NOTE — ED ADULT NURSE NOTE - MUSCULOSKELETAL ASSESSMENT
Final blood culture report is NEGATIVE.    No treatment or change in treatment per [facility] ED Lab Result protocol.
Preliminary Blood culture is showing NO GROWTH
- - -

## 2025-04-27 NOTE — ED PROVIDER NOTE - CLINICAL SUMMARY MEDICAL DECISION MAKING FREE TEXT BOX
59yfAfib (on Eliquis and plavix), hx DCCV 4/13/23 presents as code stroke for episode of blank stare w expressive aphasia as per wife aroud 9pm, prior to that last known well 850. during this episode pt had one episode of non bloody emesis and reported room spinning dizziness. has never had this before. denies recent fevers, chest pain, sob. has been complaint with meds. last drink of etoh friday, social drinker. FS 95, on arrival to ed pt endorsing dizziness worse w head movement w right sided beating nystagmus, pt is tremulous but no signs of seizure activity pt A&OX4, and no neuro deficits. concern for arrhythmogenic etiology, hypoperfusion vs bppv, rather then primary neurologic etiology, will get ct, blood work, rectal temp eval for signs of infectious/toxic/metabolic cause as well.

## 2025-04-27 NOTE — ED PROVIDER NOTE - PATIENT PORTAL LINK FT
You can access the FollowMyHealth Patient Portal offered by [facility] by registering at the following website: [email or web address]. By joining [facility]’s FollowMyHealth portal, you will also be able to view your health information using other applications (apps) compatible with our system.

## 2025-04-27 NOTE — CONSULT NOTE ADULT - SUBJECTIVE AND OBJECTIVE BOX
**STROKE CODE CONSULT NOTE**    Last known well time/Time of onset of symptoms: 4/27/25: 1830    HPI: This is a 59/M Afib on Eliquis, hx of ILR implantation and ablation in 2025, hx DCCV 2023, CAD diffuse disease without obstruction (dx cath 3/21/23, on Plavix, high dose statin), HTN, AMY presenting with room spinning dizziness. Patient reports that he felt dizzy and lightheaded at around 1830, he had an episode of vomiting and then had dinner with his wife and walked up to the bedroom. When wife saw him at 2100 and she was talking to him she noticed she wasn't responding. She also noticed tremulousness of the lower limbs and upper limbs. He was unsteady on his feet and had another episode of vomiting. Patient reports that he could hear his wife during the episode but was unable to respond at that time EMS was called and patient was a code stroke at arrival, NIHSS-0. Noted to have tremulousness which improved with ativan and distractibility. Dizziness worsening with positional change but otherwise constant and associated with nausea and vomiting. No focal symptoms but no worsening with head movements.   CTH unremarkable, CTA without significant stenosis, CTP without mismatch. No LOC or urinary incontinence or tongue bite as per wife. No trauma, falls, fevers or chills. Reports coming back from a vacation from [place] where he was drinking mor alcohol than he usually but otherwise. Infreqeuent alcohol use.     Not a TNK candidate as abnormal coags in setting of Eliquis  Not a thrombectomy candidate as no LVO    PAST MEDICAL & SURGICAL HISTORY:  Chronic GERD      Mixed hyperlipidemia      HTN (hypertension)      A-fib      Abdominal mass      AMY (obstructive sleep apnea)      Anxiety      Carpal tunnel syndrome      Obesity      Palpitation      Atrial fibrillation status post cardioversion      S/P ablation of atrial flutter      S/P arthroscopy  left shoulder- 2014, left knee-1999      History of shoulder surgery      History of knee surgery      S/P cardiac cath      History of carpal tunnel release          FAMILY HISTORY:  Family history of heart attack (Father)        SOCIAL HISTORY:  Smoking Cessation: Discussed      MEDICATIONS  (STANDING):    MEDICATIONS  (PRN):      Allergies    No Known Allergies    Intolerances        Vital Signs Last 24 Hrs  T(C): 36.7 (27 Apr 2025 21:17), Max: 36.7 (27 Apr 2025 21:17)  T(F): 98.1 (27 Apr 2025 21:17), Max: 98.1 (27 Apr 2025 21:17)  HR: 103 (27 Apr 2025 21:17) (103 - 103)  BP: 137/88 (27 Apr 2025 21:17) (137/88 - 137/88)  BP(mean): --  RR: 16 (27 Apr 2025 21:17) (16 - 16)  SpO2: 96% (27 Apr 2025 21:17) (96% - 96%)    Parameters below as of 27 Apr 2025 21:17  Patient On (Oxygen Delivery Method): room air      PHYSICAL EXAM:  Constitutional: Uncomfortable  Cardiovascular:  Tachycardia  Neurologic:  Mental status: Awake, alert and oriented x3. Naming, repetition and comprehension intact.  No dysarthria, no aphasia.  Cranial nerves: Pupils equally round and reactive to light, visual fields full, no nystagmus, extraocular muscles intact, V1 through V3 intact bilaterally and symmetric, face symmetric, hearing intact to finger rub, palate elevation symmetric, tongue was midline, shoulder shrug strength bilaterally 5/5.    Motor:  Normal bulk and tone, strength 5/5 in bilateral upper and lower extremities.   strength 5/5.  Rapid alternating movements intact and symmetric. High amplitude tremulousness, semi rhythmic.   Sensation: Intact to light touch.  No neglect.   Coordination: No dysmetria on finger-to-nose ( improves with removing postural component) and heel-to-shin.  No clumsiness.  Reflexes: Not tested due to focused exam. Downgoing toes bilaterally  Gait: Narrow and but unsteady. No truncal while standing and sitting up    HI- With corrective saccade to right  N- Horizontal fatigable nystagmus towards R. ear  TS- absent    NIHSS: 0    Fingerstick Blood Glucose: CAPILLARY BLOOD GLUCOSE      POCT Blood Glucose.: 97 mg/dL (27 Apr 2025 21:21)       LABS:                        15.6   9.02  )-----------( 249      ( 27 Apr 2025 21:31 )             45.0     04-27    143  |  106  |  16  ----------------------------<  110[H]  3.9   |  21[L]  |  0.96    Ca    9.2      27 Apr 2025 21:31  Phos  2.1     04-27  Mg     2.0     04-27    TPro  7.2  /  Alb  4.6  /  TBili  0.8  /  DBili  x   /  AST  61[H]  /  ALT  71[H]  /  AlkPhos  128[H]  04-27    PT/INR - ( 27 Apr 2025 21:31 )   PT: 14.1 sec;   INR: 1.23 ratio         PTT - ( 27 Apr 2025 21:31 )  PTT:32.1 sec      Urinalysis Basic - ( 27 Apr 2025 21:31 )    Color: x / Appearance: x / SG: x / pH: x  Gluc: 110 mg/dL / Ketone: x  / Bili: x / Urobili: x   Blood: x / Protein: x / Nitrite: x   Leuk Esterase: x / RBC: x / WBC x   Sq Epi: x / Non Sq Epi: x / Bacteria: x        RADIOLOGY & ADDITIONAL STUDIES:    < from: CT Brain Stroke Protocol (04.27.25 @ 21:47) >  IMPRESSION:  Unremarkable noncontrast head CT. No acute hemorrhage.

## 2025-04-27 NOTE — ED PROVIDER NOTE - PROGRESS NOTE DETAILS
Diana PGY3, pt feeling better, cta negative, cleared by neuro for dc. will trial ambulation prior to dc.

## 2025-04-27 NOTE — CONSULT NOTE ADULT - ASSESSMENT
Assessment:   This is a 59/M Afib on Eliquis, hx of ILR implantation and ablation in 2025, hx DCCV 2023, CAD diffuse disease without obstruction (dx cath 3/21/23, on Plavix, high dose statin), HTN, AMY presenting with room spinning dizziness, nausea/ vomiting and room spinning dizziness. EMS was called and patient was a code stroke at arrival, NIHSS-0. Noted to have tremulousness which improved with ativan and distractibility. Dizziness worsening with positional change but otherwise constant and associated with nausea and vomiting. No focal symptoms, no truncal or limb ataxia. HINTS with positive head impulse and fatigable right beating nystagmus. CTH unremarkable, CTA without significant stenosis, CTP without mismatch.      Initial VS in ED: 137/88    mRS: 1    Impression: Acute onset room spinning dizziness with positive head impulse and fatigable horizontal nystagmus likely acute vestibular syndrome. Central causes less likely given lack of focal symptoms, eliquis and Plavix therapy.  Tremulousness generalized with preserved mentation, to r/o toxic metabolic factors     Recommendations:    Diagnostics:  [] MRI brain without contrast if dizziness persists after symptomatic m/m  [] ILR interrogation in AM  [] Lipid panel, HbA1c, UA, sr ammonia, blood alcohol, Utox, flu-covid and basic infectious work-up    Medications:  [] Continue home eliquis for secondary stroke ppx  [] Continue home plavix if indicated for cardiac reasons  [] Continue home atorvastatin   [] Eliquis for DVT prophylaxis     Miscellaneous:  [] BP normotensive  [] Telemonitoring  [] R. EEG in AM if tremulousness persists  [] BGM goals 140-180  [] PT/OT in AM    Discussed with Dr. Gramajo, stroke fellow  Patient to be seen by attending and team in AM. Assessment:   This is a 59/M Afib on Eliquis, hx of ILR implantation and ablation in 2025, hx DCCV 2023, CAD diffuse disease without obstruction (dx cath 3/21/23, on Plavix, high dose statin), HTN, AMY presenting with room spinning dizziness, nausea/ vomiting and room spinning dizziness. EMS was called and patient was a code stroke at arrival, NIHSS-0. Noted to have tremulousness which improved with ativan and distractibility. Dizziness worsening with positional change but otherwise constant and associated with nausea and vomiting. No focal symptoms, no truncal or limb ataxia. HINTS with positive head impulse and fatigable right beating nystagmus. CTH unremarkable, CTA without significant stenosis, CTP without mismatch. Noted to have deranged liver function.       Initial VS in ED: 137/88    mRS: 1    Impression: Acute onset room spinning dizziness with positive head impulse and fatigable horizontal nystagmus likely acute vestibular syndrome. Central causes less likely given lack of focal symptoms, eliquis and Plavix therapy.  Tremulousness generalized with preserved mentation, to r/o toxic metabolic factors   Episode of decreased responsiveness possibly in setting of hypoperfusion.     Recommendations:    Medications:  [] Symptomatic treatment with meclizine 25mg q8 PRN and IV zofran 4mg q8   [] Continue home eliquis for secondary stroke ppx  [] Continue home plavix if indicated for cardiac reasons  [] Continue home atorvastatin   [] Eliquis for DVT prophylaxis       Diagnostics:  [] MRI brain without contrast if dizziness persists after symptomatic m/m  [] Orthostatic vitals as per ED  [] ILR interrogation in AM  [] Lipid panel, HbA1c, UA, sr ammonia, blood alcohol, Utox, flu-covid and basic infectious work-up    Miscellaneous:  [] BP normotensive  [] Telemonitoring  [] R. EEG in AM if tremulousness persists  [] CIWA protocol as per ED  [] BGM goals 140-180  [] PT/OT in AM    Discussed with Dr. Gramajo, stroke fellow  Patient to be seen by attending and team in AM. Assessment:   This is a 59/M Afib on Eliquis, hx of ILR implantation and ablation in 2025, hx DCCV 2023, CAD diffuse disease without obstruction (dx cath 3/21/23, on Plavix, high dose statin), HTN, AMY presenting with room spinning dizziness, nausea/ vomiting and room spinning dizziness. EMS was called and patient was a code stroke at arrival, NIHSS-0. Noted to have tremulousness which improved with ativan and distractibility. Dizziness worsening with positional change but otherwise constant and associated with nausea and vomiting. No focal symptoms, no truncal or limb ataxia. HINTS with positive head impulse and fatigable right beating nystagmus. CTH unremarkable, CTA without significant stenosis, CTP without mismatch. Noted to have deranged liver function.       Initial VS in ED: 137/88    mRS: 1    Impression: Acute onset room spinning dizziness with positive head impulse and fatigable horizontal nystagmus likely acute vestibular syndrome. Central causes less likely given lack of focal symptoms, eliquis and Plavix therapy.  Tremulousness generalized with preserved mentation, to r/o toxic metabolic factors   Episode of decreased responsiveness possibly in setting of hypoperfusion.     Recommendations:    Medications:  [] Symptomatic treatment with meclizine 25mg q8 PRN and IV zofran 4mg q8   [] Continue home eliquis for secondary stroke ppx  [] Continue home plavix if indicated for cardiac reasons  [] Continue home atorvastatin   [] Eliquis for DVT prophylaxis       Diagnostics:  [] MRI brain without contrast if dizziness persists after symptomatic m/m  [] Orthostatic vitals as per ED  [] ILR interrogation in AM  [] Lipid panel, HbA1c, UA, sr ammonia, blood alcohol, Utox, flu-covid and basic infectious work-up    Miscellaneous:  [] BP normotensive  [] Telemonitoring  [] R. EEG in AM if tremulousness persists  [] CIWA protocol as per ED  [] BGM goals 140-180  [] PT/OT in AM    If patient were to get discharged, please provide neurology information for f/u  -Follow up with Neurology as outpatient Dr. Deng Nava after discharge. Please instruct the patient/family to call [phone] to schedule an appointment within the next 1-2 weeks. Office is located at [address] [place] [address].  -If pt is without insurance, patient can follow up with [facility], located in [address] [place] [address] at [address] [place] [address]. Patient/family can call [phone] to schedule this appointment.      Discussed with Dr. Gramajo, stroke fellow  Patient to be seen by attending and team in AM.

## 2025-04-27 NOTE — ED ADULT NURSE NOTE - OBJECTIVE STATEMENT
Pt is a 59 y male PMHX ablation last month, afib on ACs, presents to the ED with c/o n/v, new onset disorientation. Pt endorsing one episode of N/V today as well as dizziness that "feels like the room is spinning." Pt denies LOC, chest pain, headache, vision changes, diarrhea or numbness tingling in extremities. Pt accompanied by wife at bedside who describes pt as "absent" after episode of vomiting and states he had a change in mental status. P Upon examination, Patient is A&Ox4. Face is symmetrical. PERRL 3mmB. Speech is clear. Patient is moving all extremities with 5/5 strength. t placed o continuous cardiac monitoring and pulse ox. Safety and comfort provided. Bed locked and in lowest position, blanket given and call bell within reach.

## 2025-04-28 VITALS
OXYGEN SATURATION: 95 % | HEART RATE: 82 BPM | DIASTOLIC BLOOD PRESSURE: 65 MMHG | TEMPERATURE: 98 F | RESPIRATION RATE: 19 BRPM | SYSTOLIC BLOOD PRESSURE: 110 MMHG

## 2025-04-28 PROCEDURE — 83735 ASSAY OF MAGNESIUM: CPT

## 2025-04-28 PROCEDURE — 82550 ASSAY OF CK (CPK): CPT

## 2025-04-28 PROCEDURE — 82330 ASSAY OF CALCIUM: CPT

## 2025-04-28 PROCEDURE — 80307 DRUG TEST PRSMV CHEM ANLYZR: CPT

## 2025-04-28 PROCEDURE — 82435 ASSAY OF BLOOD CHLORIDE: CPT

## 2025-04-28 PROCEDURE — 0042T: CPT | Mod: MC

## 2025-04-28 PROCEDURE — 82962 GLUCOSE BLOOD TEST: CPT

## 2025-04-28 PROCEDURE — 70496 CT ANGIOGRAPHY HEAD: CPT | Mod: MC

## 2025-04-28 PROCEDURE — 85018 HEMOGLOBIN: CPT

## 2025-04-28 PROCEDURE — 83605 ASSAY OF LACTIC ACID: CPT

## 2025-04-28 PROCEDURE — 96375 TX/PRO/DX INJ NEW DRUG ADDON: CPT

## 2025-04-28 PROCEDURE — 99291 CRITICAL CARE FIRST HOUR: CPT | Mod: 25

## 2025-04-28 PROCEDURE — 93005 ELECTROCARDIOGRAM TRACING: CPT

## 2025-04-28 PROCEDURE — 85610 PROTHROMBIN TIME: CPT

## 2025-04-28 PROCEDURE — 84295 ASSAY OF SERUM SODIUM: CPT

## 2025-04-28 PROCEDURE — 85730 THROMBOPLASTIN TIME PARTIAL: CPT

## 2025-04-28 PROCEDURE — 70450 CT HEAD/BRAIN W/O DYE: CPT | Mod: MC

## 2025-04-28 PROCEDURE — 84100 ASSAY OF PHOSPHORUS: CPT

## 2025-04-28 PROCEDURE — 85025 COMPLETE CBC W/AUTO DIFF WBC: CPT

## 2025-04-28 PROCEDURE — 84132 ASSAY OF SERUM POTASSIUM: CPT

## 2025-04-28 PROCEDURE — 84484 ASSAY OF TROPONIN QUANT: CPT

## 2025-04-28 PROCEDURE — 96374 THER/PROPH/DIAG INJ IV PUSH: CPT | Mod: XU

## 2025-04-28 PROCEDURE — 82947 ASSAY GLUCOSE BLOOD QUANT: CPT

## 2025-04-28 PROCEDURE — 70498 CT ANGIOGRAPHY NECK: CPT | Mod: MC

## 2025-04-28 PROCEDURE — 82803 BLOOD GASES ANY COMBINATION: CPT

## 2025-04-28 PROCEDURE — 85014 HEMATOCRIT: CPT

## 2025-04-28 PROCEDURE — 80053 COMPREHEN METABOLIC PANEL: CPT

## 2025-04-28 RX ORDER — MECLIZINE HCL 12.5 MG
1 TABLET ORAL
Qty: 21 | Refills: 0
Start: 2025-04-28 | End: 2025-05-04

## 2025-04-28 RX ORDER — LORAZEPAM 4 MG/ML
1 VIAL (ML) INJECTION ONCE
Refills: 0 | Status: DISCONTINUED | OUTPATIENT
Start: 2025-04-28 | End: 2025-04-27

## 2025-04-28 RX ORDER — SOD PHOS DI, MONO/K PHOS MONO 250 MG
1 TABLET ORAL ONCE
Refills: 0 | Status: COMPLETED | OUTPATIENT
Start: 2025-04-28 | End: 2025-04-28

## 2025-04-28 RX ADMIN — Medication 1 PACKET(S): at 02:22

## 2025-04-29 ENCOUNTER — APPOINTMENT (OUTPATIENT)
Dept: ELECTROPHYSIOLOGY | Facility: CLINIC | Age: 60
End: 2025-04-29
Payer: COMMERCIAL

## 2025-04-29 ENCOUNTER — NON-APPOINTMENT (OUTPATIENT)
Age: 60
End: 2025-04-29

## 2025-04-29 VITALS
HEART RATE: 85 BPM | OXYGEN SATURATION: 97 % | DIASTOLIC BLOOD PRESSURE: 88 MMHG | WEIGHT: 195 LBS | BODY MASS INDEX: 29.65 KG/M2 | SYSTOLIC BLOOD PRESSURE: 131 MMHG

## 2025-04-29 DIAGNOSIS — I48.91 UNSPECIFIED ATRIAL FIBRILLATION: ICD-10-CM

## 2025-04-29 PROCEDURE — 99213 OFFICE O/P EST LOW 20 MIN: CPT | Mod: 25

## 2025-04-29 PROCEDURE — 93291 INTERROG DEV EVAL SCRMS IP: CPT

## 2025-04-30 ENCOUNTER — TRANSCRIPTION ENCOUNTER (OUTPATIENT)
Age: 60
End: 2025-04-30

## 2025-05-05 ENCOUNTER — TRANSCRIPTION ENCOUNTER (OUTPATIENT)
Age: 60
End: 2025-05-05

## 2025-05-19 ENCOUNTER — APPOINTMENT (OUTPATIENT)
Dept: INTERNAL MEDICINE | Facility: CLINIC | Age: 60
End: 2025-05-19

## 2025-05-19 VITALS — SYSTOLIC BLOOD PRESSURE: 118 MMHG | DIASTOLIC BLOOD PRESSURE: 70 MMHG

## 2025-05-19 VITALS
OXYGEN SATURATION: 97 % | DIASTOLIC BLOOD PRESSURE: 87 MMHG | BODY MASS INDEX: 28.19 KG/M2 | HEART RATE: 79 BPM | SYSTOLIC BLOOD PRESSURE: 130 MMHG | WEIGHT: 186 LBS | HEIGHT: 68 IN

## 2025-05-19 DIAGNOSIS — I70.90 UNSPECIFIED ATHEROSCLEROSIS: ICD-10-CM

## 2025-05-19 DIAGNOSIS — K21.9 GASTRO-ESOPHAGEAL REFLUX DISEASE W/OUT ESOPHAGITIS: ICD-10-CM

## 2025-05-19 DIAGNOSIS — I48.91 UNSPECIFIED ATRIAL FIBRILLATION: ICD-10-CM

## 2025-05-19 DIAGNOSIS — I51.9 HEART DISEASE, UNSPECIFIED: ICD-10-CM

## 2025-05-19 DIAGNOSIS — I48.0 PAROXYSMAL ATRIAL FIBRILLATION: ICD-10-CM

## 2025-05-19 PROCEDURE — G2211 COMPLEX E/M VISIT ADD ON: CPT | Mod: NC

## 2025-05-19 PROCEDURE — 99215 OFFICE O/P EST HI 40 MIN: CPT

## 2025-06-03 ENCOUNTER — APPOINTMENT (OUTPATIENT)
Dept: ELECTROPHYSIOLOGY | Facility: CLINIC | Age: 60
End: 2025-06-03
Payer: COMMERCIAL

## 2025-06-03 ENCOUNTER — APPOINTMENT (OUTPATIENT)
Dept: ELECTROPHYSIOLOGY | Facility: CLINIC | Age: 60
End: 2025-06-03

## 2025-06-03 ENCOUNTER — NON-APPOINTMENT (OUTPATIENT)
Age: 60
End: 2025-06-03

## 2025-06-03 PROCEDURE — 93298 REM INTERROG DEV EVAL SCRMS: CPT

## 2025-06-26 ENCOUNTER — APPOINTMENT (OUTPATIENT)
Dept: CARDIOLOGY | Facility: CLINIC | Age: 60
End: 2025-06-26
Payer: COMMERCIAL

## 2025-06-26 VITALS
WEIGHT: 182.13 LBS | BODY MASS INDEX: 27.69 KG/M2 | HEART RATE: 74 BPM | OXYGEN SATURATION: 95 % | SYSTOLIC BLOOD PRESSURE: 115 MMHG | DIASTOLIC BLOOD PRESSURE: 80 MMHG

## 2025-06-26 PROCEDURE — 93000 ELECTROCARDIOGRAM COMPLETE: CPT

## 2025-06-26 PROCEDURE — G2211 COMPLEX E/M VISIT ADD ON: CPT | Mod: NC

## 2025-06-26 PROCEDURE — 99214 OFFICE O/P EST MOD 30 MIN: CPT

## 2025-07-01 ENCOUNTER — APPOINTMENT (OUTPATIENT)
Dept: ORTHOPEDIC SURGERY | Facility: CLINIC | Age: 60
End: 2025-07-01

## 2025-07-08 ENCOUNTER — APPOINTMENT (OUTPATIENT)
Dept: ELECTROPHYSIOLOGY | Facility: CLINIC | Age: 60
End: 2025-07-08

## 2025-07-08 PROCEDURE — 93298 REM INTERROG DEV EVAL SCRMS: CPT

## 2025-07-09 ENCOUNTER — RX RENEWAL (OUTPATIENT)
Age: 60
End: 2025-07-09

## 2025-07-22 ENCOUNTER — APPOINTMENT (OUTPATIENT)
Dept: ELECTROPHYSIOLOGY | Facility: CLINIC | Age: 60
End: 2025-07-22
Payer: COMMERCIAL

## 2025-07-22 VITALS — SYSTOLIC BLOOD PRESSURE: 109 MMHG | DIASTOLIC BLOOD PRESSURE: 75 MMHG | OXYGEN SATURATION: 97 % | HEART RATE: 75 BPM

## 2025-07-22 PROCEDURE — 99215 OFFICE O/P EST HI 40 MIN: CPT

## 2025-07-22 PROCEDURE — 93291 INTERROG DEV EVAL SCRMS IP: CPT

## 2025-08-11 ENCOUNTER — TRANSCRIPTION ENCOUNTER (OUTPATIENT)
Age: 60
End: 2025-08-11

## 2025-08-19 ENCOUNTER — APPOINTMENT (OUTPATIENT)
Dept: ORTHOPEDIC SURGERY | Facility: CLINIC | Age: 60
End: 2025-08-19

## 2025-08-26 ENCOUNTER — NON-APPOINTMENT (OUTPATIENT)
Age: 60
End: 2025-08-26

## 2025-08-26 ENCOUNTER — APPOINTMENT (OUTPATIENT)
Dept: ELECTROPHYSIOLOGY | Facility: CLINIC | Age: 60
End: 2025-08-26
Payer: COMMERCIAL

## 2025-08-26 PROCEDURE — 93298 REM INTERROG DEV EVAL SCRMS: CPT

## 2025-09-05 ENCOUNTER — RX RENEWAL (OUTPATIENT)
Age: 60
End: 2025-09-05

## 2025-09-19 ENCOUNTER — APPOINTMENT (OUTPATIENT)
Dept: CARDIOLOGY | Facility: CLINIC | Age: 60
End: 2025-09-19
Payer: COMMERCIAL

## 2025-09-19 ENCOUNTER — APPOINTMENT (OUTPATIENT)
Dept: INTERNAL MEDICINE | Facility: CLINIC | Age: 60
End: 2025-09-19
Payer: COMMERCIAL

## 2025-09-19 VITALS — WEIGHT: 179.38 LBS | BODY MASS INDEX: 27.27 KG/M2 | HEART RATE: 81 BPM | OXYGEN SATURATION: 97 %

## 2025-09-19 VITALS — DIASTOLIC BLOOD PRESSURE: 70 MMHG | SYSTOLIC BLOOD PRESSURE: 96 MMHG

## 2025-09-19 DIAGNOSIS — I48.0 PAROXYSMAL ATRIAL FIBRILLATION: ICD-10-CM

## 2025-09-19 DIAGNOSIS — I70.90 UNSPECIFIED ATHEROSCLEROSIS: ICD-10-CM

## 2025-09-19 PROCEDURE — G2211 COMPLEX E/M VISIT ADD ON: CPT

## 2025-09-19 PROCEDURE — 99214 OFFICE O/P EST MOD 30 MIN: CPT

## 2025-09-19 PROCEDURE — 93000 ELECTROCARDIOGRAM COMPLETE: CPT

## 2025-09-19 PROCEDURE — 36415 COLL VENOUS BLD VENIPUNCTURE: CPT

## (undated) DEVICE — PREP SCRUB SKIN EXIDINE4% 30OZ

## (undated) DEVICE — SLING ARM CHIEFTAIN MESH LARGE

## (undated) DEVICE — SUT MONOSOF 5-0 18" P-13

## (undated) DEVICE — TOURNIQUET CUFF 18" DUAL PORT DUAL BLADDER W PLC (BLACK)

## (undated) DEVICE — POSITIONER STRAP ARMBOARD VELCRO TS-30

## (undated) DEVICE — POSITIONER FOAM HEAD CRADLE (PINK)

## (undated) DEVICE — DRAPE 3/4 SHEET 52X76"

## (undated) DEVICE — WARMING BLANKET LOWER ADULT

## (undated) DEVICE — DRAPE TOWEL BLUE 17" X 24"

## (undated) DEVICE — TOURNIQUET ESMARK 4"

## (undated) DEVICE — VENODYNE/SCD SLEEVE CALF MEDIUM

## (undated) DEVICE — DRSG ACE BANDAGE 2"

## (undated) DEVICE — GLV 7 PROTEXIS (WHITE)

## (undated) DEVICE — PACK UPPER EXTREMITY

## (undated) DEVICE — GLV 8 PROTEXIS (BLUE)

## (undated) DEVICE — PREP CHLOROHEXIDINE 4% 118CC KIT

## (undated) DEVICE — GLV 7.5 PROTEXIS (WHITE)